# Patient Record
Sex: FEMALE | Race: WHITE | NOT HISPANIC OR LATINO | Employment: FULL TIME | URBAN - METROPOLITAN AREA
[De-identification: names, ages, dates, MRNs, and addresses within clinical notes are randomized per-mention and may not be internally consistent; named-entity substitution may affect disease eponyms.]

---

## 2017-01-06 DIAGNOSIS — I10 HTN (HYPERTENSION), BENIGN: ICD-10-CM

## 2017-01-06 DIAGNOSIS — E78.5 HLD (HYPERLIPIDEMIA): ICD-10-CM

## 2017-01-06 DIAGNOSIS — H10.13 ALLERGIC CONJUNCTIVITIS, BILATERAL: ICD-10-CM

## 2017-01-06 RX ORDER — CROMOLYN SODIUM 40 MG/ML
SOLUTION/ DROPS OPHTHALMIC
Qty: 10 ML | Refills: 12 | Status: SHIPPED | OUTPATIENT
Start: 2017-01-06 | End: 2018-02-20 | Stop reason: SDUPTHER

## 2017-01-06 RX ORDER — HYDROCHLOROTHIAZIDE 12.5 MG/1
CAPSULE ORAL
Qty: 90 CAPSULE | Refills: 0 | Status: SHIPPED | OUTPATIENT
Start: 2017-01-06 | End: 2017-02-14 | Stop reason: SDUPTHER

## 2017-01-06 RX ORDER — ATORVASTATIN CALCIUM 10 MG/1
TABLET, FILM COATED ORAL
Qty: 90 TABLET | Refills: 0 | Status: SHIPPED | OUTPATIENT
Start: 2017-01-06 | End: 2017-02-14 | Stop reason: SDUPTHER

## 2017-01-22 DIAGNOSIS — M71.21 BAKER CYST, RIGHT: ICD-10-CM

## 2017-01-22 RX ORDER — NAPROXEN 500 MG/1
TABLET ORAL
Qty: 60 TABLET | Refills: 0 | Status: SHIPPED | OUTPATIENT
Start: 2017-01-22 | End: 2017-02-14 | Stop reason: SDUPTHER

## 2017-02-09 ENCOUNTER — LAB VISIT (OUTPATIENT)
Dept: LAB | Facility: HOSPITAL | Age: 67
End: 2017-02-09
Attending: FAMILY MEDICINE
Payer: COMMERCIAL

## 2017-02-09 DIAGNOSIS — Z00.00 ROUTINE GENERAL MEDICAL EXAMINATION AT A HEALTH CARE FACILITY: ICD-10-CM

## 2017-02-09 LAB
ALBUMIN SERPL BCP-MCNC: 4.1 G/DL
ALP SERPL-CCNC: 68 U/L
ALT SERPL W/O P-5'-P-CCNC: 17 U/L
ANION GAP SERPL CALC-SCNC: 5 MMOL/L
AST SERPL-CCNC: 20 U/L
BASOPHILS # BLD AUTO: 0.01 K/UL
BASOPHILS NFR BLD: 0.2 %
BILIRUB SERPL-MCNC: 0.7 MG/DL
BUN SERPL-MCNC: 17 MG/DL
CALCIUM SERPL-MCNC: 9.2 MG/DL
CHLORIDE SERPL-SCNC: 104 MMOL/L
CHOLEST/HDLC SERPL: 2.3 {RATIO}
CO2 SERPL-SCNC: 29 MMOL/L
CREAT SERPL-MCNC: 0.8 MG/DL
DIFFERENTIAL METHOD: ABNORMAL
EOSINOPHIL # BLD AUTO: 0.2 K/UL
EOSINOPHIL NFR BLD: 3 %
ERYTHROCYTE [DISTWIDTH] IN BLOOD BY AUTOMATED COUNT: 12.6 %
EST. GFR  (AFRICAN AMERICAN): >60 ML/MIN/1.73 M^2
EST. GFR  (NON AFRICAN AMERICAN): >60 ML/MIN/1.73 M^2
GLUCOSE SERPL-MCNC: 85 MG/DL
HCT VFR BLD AUTO: 46.1 %
HDL/CHOLESTEROL RATIO: 42.7 %
HDLC SERPL-MCNC: 164 MG/DL
HDLC SERPL-MCNC: 70 MG/DL
HGB BLD-MCNC: 15.2 G/DL
LDLC SERPL CALC-MCNC: 82.4 MG/DL
LYMPHOCYTES # BLD AUTO: 2.4 K/UL
LYMPHOCYTES NFR BLD: 45.8 %
MCH RBC QN AUTO: 31.3 PG
MCHC RBC AUTO-ENTMCNC: 33 %
MCV RBC AUTO: 95 FL
MONOCYTES # BLD AUTO: 0.4 K/UL
MONOCYTES NFR BLD: 7.7 %
NEUTROPHILS # BLD AUTO: 2.3 K/UL
NEUTROPHILS NFR BLD: 43.1 %
NONHDLC SERPL-MCNC: 94 MG/DL
PLATELET # BLD AUTO: 170 K/UL
PMV BLD AUTO: 12 FL
POTASSIUM SERPL-SCNC: 3.9 MMOL/L
PROT SERPL-MCNC: 6.9 G/DL
RBC # BLD AUTO: 4.86 M/UL
SODIUM SERPL-SCNC: 138 MMOL/L
TRIGL SERPL-MCNC: 58 MG/DL
TSH SERPL DL<=0.005 MIU/L-ACNC: 1.91 UIU/ML
WBC # BLD AUTO: 5.31 K/UL

## 2017-02-09 PROCEDURE — 85025 COMPLETE CBC W/AUTO DIFF WBC: CPT

## 2017-02-09 PROCEDURE — 80061 LIPID PANEL: CPT

## 2017-02-09 PROCEDURE — 84443 ASSAY THYROID STIM HORMONE: CPT

## 2017-02-09 PROCEDURE — 36415 COLL VENOUS BLD VENIPUNCTURE: CPT | Mod: PO

## 2017-02-09 PROCEDURE — 80053 COMPREHEN METABOLIC PANEL: CPT

## 2017-02-14 ENCOUNTER — OFFICE VISIT (OUTPATIENT)
Dept: FAMILY MEDICINE | Facility: CLINIC | Age: 67
End: 2017-02-14
Payer: COMMERCIAL

## 2017-02-14 VITALS
WEIGHT: 116.88 LBS | HEART RATE: 82 BPM | HEIGHT: 60 IN | SYSTOLIC BLOOD PRESSURE: 120 MMHG | TEMPERATURE: 98 F | DIASTOLIC BLOOD PRESSURE: 80 MMHG | BODY MASS INDEX: 22.95 KG/M2

## 2017-02-14 DIAGNOSIS — Z12.31 ENCOUNTER FOR SCREENING MAMMOGRAM FOR BREAST CANCER: ICD-10-CM

## 2017-02-14 DIAGNOSIS — L81.9 HYPERPIGMENTATION: ICD-10-CM

## 2017-02-14 DIAGNOSIS — E78.5 HYPERLIPIDEMIA, UNSPECIFIED HYPERLIPIDEMIA TYPE: ICD-10-CM

## 2017-02-14 DIAGNOSIS — Z23 NEED FOR 23-POLYVALENT PNEUMOCOCCAL POLYSACCHARIDE VACCINE: ICD-10-CM

## 2017-02-14 DIAGNOSIS — M71.21 BAKER CYST, RIGHT: ICD-10-CM

## 2017-02-14 DIAGNOSIS — I10 HTN (HYPERTENSION), BENIGN: ICD-10-CM

## 2017-02-14 DIAGNOSIS — Z00.00 ANNUAL PHYSICAL EXAM: Primary | ICD-10-CM

## 2017-02-14 PROCEDURE — 1160F RVW MEDS BY RX/DR IN RCRD: CPT | Mod: S$GLB,,, | Performed by: NURSE PRACTITIONER

## 2017-02-14 PROCEDURE — 99213 OFFICE O/P EST LOW 20 MIN: CPT | Mod: 25,S$GLB,, | Performed by: NURSE PRACTITIONER

## 2017-02-14 PROCEDURE — 3074F SYST BP LT 130 MM HG: CPT | Mod: S$GLB,,, | Performed by: NURSE PRACTITIONER

## 2017-02-14 PROCEDURE — 1157F ADVNC CARE PLAN IN RCRD: CPT | Mod: S$GLB,,, | Performed by: NURSE PRACTITIONER

## 2017-02-14 PROCEDURE — 82570 ASSAY OF URINE CREATININE: CPT

## 2017-02-14 PROCEDURE — 1126F AMNT PAIN NOTED NONE PRSNT: CPT | Mod: S$GLB,,, | Performed by: NURSE PRACTITIONER

## 2017-02-14 PROCEDURE — 99999 PR PBB SHADOW E&M-EST. PATIENT-LVL III: CPT | Mod: PBBFAC,,, | Performed by: NURSE PRACTITIONER

## 2017-02-14 PROCEDURE — 1159F MED LIST DOCD IN RCRD: CPT | Mod: S$GLB,,, | Performed by: NURSE PRACTITIONER

## 2017-02-14 PROCEDURE — 99397 PER PM REEVAL EST PAT 65+ YR: CPT | Mod: 25,S$GLB,, | Performed by: NURSE PRACTITIONER

## 2017-02-14 PROCEDURE — 1158F ADVNC CARE PLAN TLK DOCD: CPT | Mod: S$GLB,,, | Performed by: NURSE PRACTITIONER

## 2017-02-14 PROCEDURE — 3079F DIAST BP 80-89 MM HG: CPT | Mod: S$GLB,,, | Performed by: NURSE PRACTITIONER

## 2017-02-14 RX ORDER — ATORVASTATIN CALCIUM 10 MG/1
10 TABLET, FILM COATED ORAL DAILY
Qty: 90 TABLET | Refills: 3 | Status: SHIPPED | OUTPATIENT
Start: 2017-02-14 | End: 2018-02-20 | Stop reason: SDUPTHER

## 2017-02-14 RX ORDER — NAPROXEN 500 MG/1
500 TABLET ORAL 2 TIMES DAILY WITH MEALS
Qty: 60 TABLET | Refills: 0 | Status: SHIPPED | OUTPATIENT
Start: 2017-02-14 | End: 2018-02-20

## 2017-02-14 RX ORDER — HYDROCHLOROTHIAZIDE 12.5 MG/1
12.5 CAPSULE ORAL DAILY
Qty: 90 CAPSULE | Refills: 3 | Status: SHIPPED | OUTPATIENT
Start: 2017-02-14 | End: 2018-02-20 | Stop reason: SDUPTHER

## 2017-02-14 RX ORDER — HYDROQUINONE 40 MG/G
CREAM TOPICAL 2 TIMES DAILY
Qty: 46 G | Refills: 2 | Status: SHIPPED | OUTPATIENT
Start: 2017-02-14 | End: 2017-03-16

## 2017-02-14 NOTE — MR AVS SNAPSHOT
[facility]  [address] [facility], [address]  [facility] [address]  Phone: [phone]  Fax: [phone]                  Yuliana Soares   2017 3:00 PM   Office Visit    Description:  Female : 1950   Provider:  GAUTAM Beltran   Department:  [facility]           Reason for Visit     Annual Exam           Diagnoses this Visit        Comments    Annual physical exam    -  Primary     Hyperlipidemia, unspecified hyperlipidemia type         HTN (hypertension), benign         Baker cyst, right         Hyperpigmentation         Need for 23-polyvalent pneumococcal polysaccharide vaccine                To Do List           Your Future Surgeries/Procedures     2017   Surgery with Nikunj Lujan MD   [facility]    [facility] [address] [phone]              Goals (5 Years of Data)     None       These Medications        Disp Refills Start End    atorvastatin (LIPITOR) 10 MG tablet 90 tablet 3 2017     Take 1 tablet (10 mg total) by mouth once daily. - Oral    Pharmacy: [facility] [address] [place] [address] [address] AT [facility] [address] Ph #: [phone]       hydrochlorothiazide (MICROZIDE) 12.5 mg capsule 90 capsule 3 2017     Take 1 capsule (12.5 mg total) by mouth once daily. - Oral    Pharmacy: [facility] [address] [place] [address] [address] AT [facility] & [address] Ph #: [phone]       naproxen (NAPROSYN) 500 MG tablet 60 tablet 0 2017     Take 1 tablet (500 mg total) by mouth 2 (two) times daily with meals. - Oral    Pharmacy: [facility] [address] [place] [address] [address] AT [facility] [address] Ph #: [phone]       hydroquinone 4 % Crea 46 g 2 2017 3/16/2017    Apply topically 2 (two) times daily. - Topical    Pharmacy:  [facility] [address] [place] [address] [address] AT [facility] & [address] Ph #: [phone]         [facility] On Call     [facility] On Call Nurse Care Line - 24/7 Assistance  Registered nurses in the [facility] On Call Center provide clinical advisement, health education, appointment booking, and other advisory services.  Call for this free service at [phone].             Medications           Message regarding Medications     Verify the changes and/or additions to your medication regime listed below are the same as discussed with your clinician today.  If any of these changes or additions are incorrect, please notify your healthcare provider.        START taking these NEW medications        Refills    hydroquinone 4 % Crea 2    Sig: Apply topically 2 (two) times daily.    Class: Normal    Route: Topical      CHANGE how you are taking these medications     Start Taking Instead of    atorvastatin (LIPITOR) 10 MG tablet atorvastatin (LIPITOR) 10 MG tablet    Dosage:  Take 1 tablet (10 mg total) by mouth once daily. Dosage:  TAKE ONE TABLET BY MOUTH EVERY DAY    Reason for Change:  Reorder     hydrochlorothiazide (MICROZIDE) 12.5 mg capsule hydrochlorothiazide (MICROZIDE) 12.5 mg capsule    Dosage:  Take 1 capsule (12.5 mg total) by mouth once daily. Dosage:  TAKE ONE CAPSULE BY MOUTH EVERY DAY    Reason for Change:  Reorder     naproxen (NAPROSYN) 500 MG tablet naproxen (NAPROSYN) 500 MG tablet    Dosage:  Take 1 tablet (500 mg total) by mouth 2 (two) times daily with meals. Dosage:  TAKE ONE TABLET BY MOUTH TWICE DAILY WITH MEALS    Reason for Change:  Reorder       STOP taking these medications     risedronate (ACTONEL) 150 MG Tab Take 1 tablet (150 mg total) by mouth every 30 days.           Verify that the below list of medications is an accurate representation of the medications you are currently taking.  If none reported, the list may be blank. If incorrect, please contact your  healthcare provider. Carry this list with you in case of emergency.           Current Medications     atorvastatin (LIPITOR) 10 MG tablet Take 1 tablet (10 mg total) by mouth once daily.    cromolyn (OPTICROM) 4 % ophthalmic solution INSTILL ONE DROP INTO BOTH EYES FOUR TIMES DAILY AS NEEDED    hydrochlorothiazide (MICROZIDE) 12.5 mg capsule Take 1 capsule (12.5 mg total) by mouth once daily.    sodium,potassium,mag sulfates (SUPREP BOWEL PREP KIT) 17.5-3.13-1.6 gram SolR Take 1 each by mouth as directed.    hydroquinone 4 % Crea Apply topically 2 (two) times daily.    naproxen (NAPROSYN) 500 MG tablet Take 1 tablet (500 mg total) by mouth 2 (two) times daily with meals.           Clinical Reference Information           Your Vitals Were     BP Pulse Temp Height Weight BMI    120/80 82 97.8 °F (36.6 °C) 5' (1.524 m) 53 kg (116 lb 13.5 oz) 22.82 kg/m2      Blood Pressure          Most Recent Value    BP  120/80      Allergies as of 2/14/2017     Percodan  [Oxycodone Hcl-oxycodone-asa]      Immunizations Administered on Date of Encounter - 2/14/2017     Name Date Dose VIS Date Route    Pneumococcal Polysaccharide - 23 Valent  Incomplete 0.5 mL 4/24/2015 Intramuscular      Orders Placed During Today's Visit      Normal Orders This Visit    MICROALBUMIN / CREATININE RATIO URINE     Pneumococcal Polysaccharide Vaccine (23 Valent) (SQ/IM)       Instructions    Fill out your Advanced directives packet and mail a copy to us for your medical record.    Continue current medications    I will call you about your urine test    You can make a nurse appointment for your second pneumonia vaccine on or after March 11th.      Prevention Guidelines, Women Ages 65 and Older  Screening tests and vaccines are an important part of managing your health. Health counseling is essential, too. Below are guidelines for these, for women ages 65 and older. Talk with your healthcare provider to make sure youre up to date on what you  need.  Screening Who needs it How often   Type 2 diabetes or prediabetes All adults beginning at age 45 and adults without symptoms at any age who are overweight or obese and have 1 or more additional risk factors for diabetes At least every 3 years   Alcohol misuse All women in this age group At routine exams   Blood pressure All women in this age group Every 2 years if your blood pressure is less than 120/80 mm Hg; yearly if your systolic blood pressure is 120 to 139 mm Hg, or your diastolic blood pressure reading is 80 to 89 mm Hg   Breast cancer All women in this age group Yearly mammogram and clinical breast exam1   Cervical cancer Only women who had abnormal screening results before age 65 Talk with your healthcare provider   Chlamydia Women at increased risk for infection At routine exams   Colorectal cancer All women in this age group1 Flexible sigmoidoscopy every 5 years, or colonoscopy every 10 years, or double-contrast barium enema every 5 years; yearly fecal occult blood test or fecal immunochemical test; or a stool DNA test as often as your healthcare provider advises; talk with your healthcare provider about which tests are best for you   Depression All women in this age group At routine exams   Gonorrhea Sexually active women at increased risk for infection At routine exams   Hepatitis C Anyone at increased risk; 1 time for those born between 1945 and 1965 At routine exams   High cholesterol or triglycerides All women in this age group who are at risk for coronary artery disease At least every 5 years   HIV Women at increased risk for infection - talk with your healthcare provider At routine exams   Lung cancer Adults age 55 to 80 who have smoked Yearly screening in smokers with 30 pack-year history of smoking or who quit within 15 years   Obesity All women in this age group At routine exams   Osteoporosis All women in this age group Bone density test at age 65, then follow-up as advised by your  healthcare provider   Syphilis Women at increased risk for infection - talk with your healthcare provider At routine exams   Thyroid-Stimulating Hormone (TSH) All women in this age group Every 5 years   Tuberculosis Women at increased risk for infection - talk with your healthcare provider Ask your healthcare provider   Vision All women in this age group Every 1 to 2 years; if you have a chronic health condition, ask your healthcare provider if you need exams more often   Vaccine Who needs it How often   Chickenpox (varicella) All women in this age group who have no record of this infection or vaccine 2 doses; second dose should be given at least 4 weeks after the first dose   Hepatitis A Women at increased risk for infection - talk with your healthcare provider 2 doses given 6 months apart   Hepatitis B Women at increased risk for infection - talk with your healthcare provider 3 doses over 6 months; second dose should be given 1 month after the first dose; the third dose should be given at least 2 months after the second dose and at least 4 months after the first dose   Haemophilus influenza Type B (HIB) Women at increased risk for infection - talk with your healthcare provider 1 to 3 doses   Influenza (flu) All women in this age group Once a year   Pneumococcal conjugate vaccine (PCV13) and pneumococcal polysaccharide vaccine (PPSV23) All women in this age group 1 dose of each vaccine   Tetanus/diphtheria/pertussis (Td/Tdap) booster All women in this age group Td every 10 years, or a one-time dose of Tdap instead of a Td booster after age 18, then Td every 10 years   Zoster All women in this age group 1 dose   Counseling Who needs it How often   Diet and exercise Women who are overweight or obese When diagnosed, and then at routine exams   Fall prevention (exercise and vitamin D supplements) All women in this age group At routine exams   Sexually transmitted infection prevention Women at increased risk for  infection - talk with your healthcare provider At routine exams   Use of daily aspirin Women ages 55 and up in this age group who are at risk for cardiovascular health problems such as stroke When your risk is known   Use of tobacco and the health effects it can cause All women in this age group Every exam   1American Cancer Society  Date Last Reviewed: 8/9/2015  © 2301-5758 [employer]. [address] [place] [address]. All rights reserved. This information is not intended as a substitute for professional medical care. Always follow your healthcare professional's instructions.             Language Assistance Services     ATTENTION: Language assistance services are available, free of charge. Please call [phone].      ATENCIÓN: Si habla marychuy, tiene a vicente disposición servicios gratuitos de asistencia lingüística. Llame al [phone].     CHÚ Ý: N?u b?n nói Ti?ng Vi?t, có các d?ch v? h? tr? ngôn ng? mi?n phí dành cho b?n. G?i s? [phone].         [facility] complies with applicable Federal civil rights laws and does not discriminate on the basis of race, color, national origin, age, disability, or sex.

## 2017-02-14 NOTE — PATIENT INSTRUCTIONS
Fill out your Advanced directives packet and mail a copy to us for your medical record.    Continue current medications    I will call you about your urine test    You can make a nurse appointment for your second pneumonia vaccine on or after March 11th.      Prevention Guidelines, Women Ages 65 and Older  Screening tests and vaccines are an important part of managing your health. Health counseling is essential, too. Below are guidelines for these, for women ages 65 and older. Talk with your healthcare provider to make sure youre up to date on what you need.  Screening Who needs it How often   Type 2 diabetes or prediabetes All adults beginning at age 45 and adults without symptoms at any age who are overweight or obese and have 1 or more additional risk factors for diabetes At least every 3 years   Alcohol misuse All women in this age group At routine exams   Blood pressure All women in this age group Every 2 years if your blood pressure is less than 120/80 mm Hg; yearly if your systolic blood pressure is 120 to 139 mm Hg, or your diastolic blood pressure reading is 80 to 89 mm Hg   Breast cancer All women in this age group Yearly mammogram and clinical breast exam1   Cervical cancer Only women who had abnormal screening results before age 65 Talk with your healthcare provider   Chlamydia Women at increased risk for infection At routine exams   Colorectal cancer All women in this age group1 Flexible sigmoidoscopy every 5 years, or colonoscopy every 10 years, or double-contrast barium enema every 5 years; yearly fecal occult blood test or fecal immunochemical test; or a stool DNA test as often as your healthcare provider advises; talk with your healthcare provider about which tests are best for you   Depression All women in this age group At routine exams   Gonorrhea Sexually active women at increased risk for infection At routine exams   Hepatitis C Anyone at increased risk; 1 time for those born between 1945 and  1965 At routine exams   High cholesterol or triglycerides All women in this age group who are at risk for coronary artery disease At least every 5 years   HIV Women at increased risk for infection - talk with your healthcare provider At routine exams   Lung cancer Adults age 55 to 80 who have smoked Yearly screening in smokers with 30 pack-year history of smoking or who quit within 15 years   Obesity All women in this age group At routine exams   Osteoporosis All women in this age group Bone density test at age 65, then follow-up as advised by your healthcare provider   Syphilis Women at increased risk for infection - talk with your healthcare provider At routine exams   Thyroid-Stimulating Hormone (TSH) All women in this age group Every 5 years   Tuberculosis Women at increased risk for infection - talk with your healthcare provider Ask your healthcare provider   Vision All women in this age group Every 1 to 2 years; if you have a chronic health condition, ask your healthcare provider if you need exams more often   Vaccine Who needs it How often   Chickenpox (varicella) All women in this age group who have no record of this infection or vaccine 2 doses; second dose should be given at least 4 weeks after the first dose   Hepatitis A Women at increased risk for infection - talk with your healthcare provider 2 doses given 6 months apart   Hepatitis B Women at increased risk for infection - talk with your healthcare provider 3 doses over 6 months; second dose should be given 1 month after the first dose; the third dose should be given at least 2 months after the second dose and at least 4 months after the first dose   Haemophilus influenza Type B (HIB) Women at increased risk for infection - talk with your healthcare provider 1 to 3 doses   Influenza (flu) All women in this age group Once a year   Pneumococcal conjugate vaccine (PCV13) and pneumococcal polysaccharide vaccine (PPSV23) All women in this age group 1 dose  of each vaccine   Tetanus/diphtheria/pertussis (Td/Tdap) booster All women in this age group Td every 10 years, or a one-time dose of Tdap instead of a Td booster after age 18, then Td every 10 years   Zoster All women in this age group 1 dose   Counseling Who needs it How often   Diet and exercise Women who are overweight or obese When diagnosed, and then at routine exams   Fall prevention (exercise and vitamin D supplements) All women in this age group At routine exams   Sexually transmitted infection prevention Women at increased risk for infection - talk with your healthcare provider At routine exams   Use of daily aspirin Women ages 55 and up in this age group who are at risk for cardiovascular health problems such as stroke When your risk is known   Use of tobacco and the health effects it can cause All women in this age group Every exam   1American Cancer Society  Date Last Reviewed: 8/9/2015  © 5996-6515 [employer]. [address] [place] [address], [place] [address]. All rights reserved. This information is not intended as a substitute for professional medical care. Always follow your healthcare professional's instructions.
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
## 2017-02-15 LAB
CREAT UR-MCNC: 45 MG/DL
MICROALBUMIN UR DL<=1MG/L-MCNC: <2.5 UG/ML
MICROALBUMIN/CREATININE RATIO: NORMAL UG/MG

## 2017-02-17 ENCOUNTER — ANESTHESIA (OUTPATIENT)
Dept: ENDOSCOPY | Facility: HOSPITAL | Age: 67
End: 2017-02-17
Payer: COMMERCIAL

## 2017-02-17 ENCOUNTER — SURGERY (OUTPATIENT)
Age: 67
End: 2017-02-17

## 2017-02-17 ENCOUNTER — ANESTHESIA EVENT (OUTPATIENT)
Dept: ENDOSCOPY | Facility: HOSPITAL | Age: 67
End: 2017-02-17
Payer: COMMERCIAL

## 2017-02-17 VITALS — RESPIRATION RATE: 13 BRPM

## 2017-02-17 PROBLEM — Z12.11 SPECIAL SCREENING FOR MALIGNANT NEOPLASMS, COLON: Status: ACTIVE | Noted: 2017-02-17

## 2017-02-17 PROBLEM — K57.30 SIGMOID DIVERTICULOSIS: Status: ACTIVE | Noted: 2017-02-17

## 2017-02-17 PROCEDURE — D9220A PRA ANESTHESIA: Mod: 33,ANES,, | Performed by: ANESTHESIOLOGY

## 2017-02-17 PROCEDURE — D9220A PRA ANESTHESIA: Mod: 33,CRNA,, | Performed by: NURSE ANESTHETIST, CERTIFIED REGISTERED

## 2017-02-17 PROCEDURE — 63600175 PHARM REV CODE 636 W HCPCS: Performed by: NURSE ANESTHETIST, CERTIFIED REGISTERED

## 2017-02-17 PROCEDURE — 25000003 PHARM REV CODE 250: Performed by: NURSE ANESTHETIST, CERTIFIED REGISTERED

## 2017-02-17 RX ORDER — LIDOCAINE HCL/PF 100 MG/5ML
SYRINGE (ML) INTRAVENOUS
Status: DISCONTINUED | OUTPATIENT
Start: 2017-02-17 | End: 2017-02-17

## 2017-02-17 RX ORDER — PROPOFOL 10 MG/ML
VIAL (ML) INTRAVENOUS CONTINUOUS PRN
Status: DISCONTINUED | OUTPATIENT
Start: 2017-02-17 | End: 2017-02-17

## 2017-02-17 RX ORDER — PROPOFOL 10 MG/ML
VIAL (ML) INTRAVENOUS
Status: DISCONTINUED | OUTPATIENT
Start: 2017-02-17 | End: 2017-02-17

## 2017-02-17 RX ADMIN — LIDOCAINE HYDROCHLORIDE 100 MG: 20 INJECTION, SOLUTION INTRAVENOUS at 10:02

## 2017-02-17 RX ADMIN — PROPOFOL 60 MG: 10 INJECTION, EMULSION INTRAVENOUS at 10:02

## 2017-02-17 RX ADMIN — PROPOFOL 150 MCG/KG/MIN: 10 INJECTION, EMULSION INTRAVENOUS at 10:02

## 2017-02-17 NOTE — ANESTHESIA PREPROCEDURE EVALUATION
02/17/2017  Yuliana Soares is a 66 y.o., female.    [facility] Anesthesia Evaluation    I have reviewed the Patient Summary Reports.    I have reviewed the Nursing Notes.   I have reviewed the Medications.     Review of Systems  Anesthesia Hx:  No problems with previous Anesthesia  History of prior surgery of interest to airway management or planning: Previous anesthesia: General Denies Family Hx of Anesthesia complications.   Denies Personal Hx of Anesthesia complications.   Social:  Non-Smoker    Cardiovascular:   Exercise tolerance: good Hypertension Denies CAD.       Pulmonary:  Pulmonary Normal    Renal/:  Renal/ Normal     Hepatic/GI:   Bowel Prep.    Neurological:  Neurology Normal    Endocrine:  Endocrine Normal        Physical Exam  General:  Well nourished    Airway/Jaw/Neck:  Airway Findings: Mouth Opening: Normal Tongue: Normal  General Airway Assessment: Adult  Mallampati: II  Improves to I with phonation.  TM Distance: Normal, at least 6 cm  Jaw/Neck Findings:  Neck ROM: Normal ROM      Dental:  Dental Findings: In tact        Mental Status:  Mental Status Findings:  Cooperative, Alert and Oriented         Anesthesia Plan  Type of Anesthesia, risks & benefits discussed:  Anesthesia Type:  general  Patient's Preference:   Intra-op Monitoring Plan:   Intra-op Monitoring Plan Comments:   Post Op Pain Control Plan:   Post Op Pain Control Plan Comments:   Induction:   IV  Beta Blocker:  Patient is not currently on a Beta-Blocker (No further documentation required).       Informed Consent: Patient understands risks and agrees with Anesthesia plan.  Questions answered. Anesthesia consent signed with patient.  ASA Score: 2     Day of Surgery Review of History & Physical:    H&P update referred to the surgeon.         Ready For Surgery From Anesthesia Perspective.

## 2017-02-17 NOTE — ANESTHESIA RELEASE NOTE
Anesthesia Release from PACU Note    Patient: Yuliana Soares    Procedure(s) Performed: Procedure(s) (LRB):  COLONOSCOPY (N/A)    Anesthesia type: general    Post pain: Adequate analgesia    Post assessment: no apparent anesthetic complications and tolerated procedure well    Last Vitals:   Visit Vitals    BP (!) 157/82    Pulse 74    Temp 36.6 °C (97.8 °F)    Resp 16    Ht 5' (1.524 m)    Wt 52.2 kg (115 lb)    SpO2 96%    Breastfeeding No    BMI 22.46 kg/m2       Post vital signs: stable    Level of consciousness: awake and alert     Nausea/Vomiting: no nausea/no vomiting    Complications: none    Airway Patency: patent    Respiratory: unassisted, spontaneous ventilation, room air    Cardiovascular: stable and blood pressure at baseline    Hydration: euvolemic

## 2017-02-17 NOTE — ANESTHESIA POSTPROCEDURE EVALUATION
Anesthesia Post Evaluation    Patient: Yuliana Soares    Procedure(s) Performed: Procedure(s) (LRB):  COLONOSCOPY (N/A)    Final Anesthesia Type: general  Patient location during evaluation: GI PACU  Patient participation: Yes- Able to Participate  Level of consciousness: awake and alert  Post-procedure vital signs: reviewed and stable  Pain management: adequate  Airway patency: patent  PONV status at discharge: No PONV  Anesthetic complications: no      Cardiovascular status: hemodynamically stable  Respiratory status: unassisted, spontaneous ventilation and room air  Hydration status: euvolemic  Follow-up not needed.        Visit Vitals    BP (!) 157/82    Pulse 74    Temp 36.6 °C (97.8 °F)    Resp 16    Ht 5' (1.524 m)    Wt 52.2 kg (115 lb)    SpO2 96%    Breastfeeding No    BMI 22.46 kg/m2       Pain/Devon Score: Pain Assessment Performed: Yes (2/17/2017 11:24 AM)  Presence of Pain: denies (2/17/2017 11:24 AM)  Devon Score: 10 (2/17/2017 11:24 AM)

## 2017-02-17 NOTE — TRANSFER OF CARE
Anesthesia Transfer of Care Note    Patient: Yuliana Soares    Procedure(s) Performed: Procedure(s) (LRB):  COLONOSCOPY (N/A)    Patient location: PACU    Anesthesia Type: general    Transport from OR: Transported from OR on room air with adequate spontaneous ventilation    Post pain: adequate analgesia    Post assessment: no apparent anesthetic complications    Post vital signs: stable    Level of consciousness: alert, awake and oriented    Nausea/Vomiting: no nausea/vomiting    Complications: none          Last vitals:   Visit Vitals    BP (!) 145/83    Pulse 74    Temp 36.6 °C (97.9 °F)    Resp 12    Ht 5' (1.524 m)    Wt 52.2 kg (115 lb)    SpO2 97%    Breastfeeding No    BMI 22.46 kg/m2

## 2017-03-01 DIAGNOSIS — M71.21 BAKER CYST, RIGHT: ICD-10-CM

## 2017-03-02 RX ORDER — NAPROXEN 500 MG/1
TABLET ORAL
Qty: 60 TABLET | Refills: 0 | OUTPATIENT
Start: 2017-03-02

## 2017-04-04 DIAGNOSIS — E78.5 HLD (HYPERLIPIDEMIA): ICD-10-CM

## 2017-04-04 RX ORDER — ATORVASTATIN CALCIUM 10 MG/1
TABLET, FILM COATED ORAL
Qty: 90 TABLET | Refills: 0 | OUTPATIENT
Start: 2017-04-04

## 2017-10-17 ENCOUNTER — TELEPHONE (OUTPATIENT)
Dept: FAMILY MEDICINE | Facility: CLINIC | Age: 67
End: 2017-10-17

## 2017-10-17 ENCOUNTER — CLINICAL SUPPORT (OUTPATIENT)
Dept: FAMILY MEDICINE | Facility: CLINIC | Age: 67
End: 2017-10-17
Payer: COMMERCIAL

## 2017-10-17 DIAGNOSIS — Z23 NEED FOR PROPHYLACTIC VACCINATION AND INOCULATION AGAINST INFLUENZA: Primary | ICD-10-CM

## 2017-10-17 DIAGNOSIS — Z00.00 ROUTINE GENERAL MEDICAL EXAMINATION AT A HEALTH CARE FACILITY: Primary | ICD-10-CM

## 2017-10-17 PROCEDURE — 90732 PPSV23 VACC 2 YRS+ SUBQ/IM: CPT | Mod: S$GLB,,, | Performed by: FAMILY MEDICINE

## 2017-10-17 PROCEDURE — 90471 IMMUNIZATION ADMIN: CPT | Mod: S$GLB,,, | Performed by: FAMILY MEDICINE

## 2017-10-17 PROCEDURE — 90472 IMMUNIZATION ADMIN EACH ADD: CPT | Mod: S$GLB,,, | Performed by: FAMILY MEDICINE

## 2017-10-17 PROCEDURE — 90662 IIV NO PRSV INCREASED AG IM: CPT | Mod: S$GLB,,, | Performed by: FAMILY MEDICINE

## 2017-10-17 NOTE — PROGRESS NOTES
Two patient identifiers used, allergies reviewed, vaccines confirmed.  Pneumovax/23 pneumococcal polysaccharide vaccine administered IM right deltoid.  Flu consent signed by patient. Flu vaccine administered left deltoid.  Pt tolerated both injections well, no redness or bruising at either injection site.  Pt advised to remain in clinic 15 mins following injections for observation.

## 2017-10-17 NOTE — TELEPHONE ENCOUNTER
----- Message from Maureen Samayoa sent at 10/16/2017  4:31 PM CDT -----  Contact: patient  Doctor appointment and lab have been scheduled.  Please link lab orders to the lab appointment.  Date of doctor appointment:  2-20  Physical or EP:  physical  Date of lab appointment:  2-14  Comments:     Patient would like to schedule a pneumonia shot the second shot. Please call to schedule.

## 2017-12-21 ENCOUNTER — OFFICE VISIT (OUTPATIENT)
Dept: URGENT CARE | Facility: CLINIC | Age: 67
End: 2017-12-21
Payer: COMMERCIAL

## 2017-12-21 VITALS
HEART RATE: 77 BPM | WEIGHT: 115 LBS | DIASTOLIC BLOOD PRESSURE: 90 MMHG | HEIGHT: 60 IN | BODY MASS INDEX: 22.58 KG/M2 | TEMPERATURE: 99 F | RESPIRATION RATE: 16 BRPM | SYSTOLIC BLOOD PRESSURE: 165 MMHG | OXYGEN SATURATION: 96 %

## 2017-12-21 DIAGNOSIS — J10.1 INFLUENZA A: ICD-10-CM

## 2017-12-21 DIAGNOSIS — R05.9 COUGH: Primary | ICD-10-CM

## 2017-12-21 LAB
CTP QC/QA: YES
FLUAV AG NPH QL: POSITIVE
FLUBV AG NPH QL: NEGATIVE

## 2017-12-21 PROCEDURE — 99214 OFFICE O/P EST MOD 30 MIN: CPT | Mod: 25,S$GLB,, | Performed by: EMERGENCY MEDICINE

## 2017-12-21 PROCEDURE — 87804 INFLUENZA ASSAY W/OPTIC: CPT | Mod: QW,S$GLB,, | Performed by: EMERGENCY MEDICINE

## 2017-12-21 RX ORDER — OSELTAMIVIR PHOSPHATE 75 MG/1
75 CAPSULE ORAL 2 TIMES DAILY
Qty: 10 CAPSULE | Refills: 0 | Status: SHIPPED | OUTPATIENT
Start: 2017-12-21 | End: 2017-12-26

## 2017-12-21 RX ORDER — BENZONATATE 100 MG/1
100 CAPSULE ORAL 3 TIMES DAILY PRN
Qty: 21 CAPSULE | Refills: 0 | Status: SHIPPED | OUTPATIENT
Start: 2017-12-21 | End: 2017-12-28

## 2017-12-22 NOTE — PATIENT INSTRUCTIONS
TESSALON PERLES RX  TAMIFLU RX  CONTINUE DAYQUIL AND NYQUIL FOR CONGESTION/COUGH  ADD MUCINEX DM IF NEEDED TO LOOSEN UP PHLEGM  REST AND HYDRATE WITH PLENTY OF FLUIDS  FLU A POSITIVE ON SWAB    SEE INFLUENZA SHEET      Influenza (Adult)    Influenza is also called the flu. It is a viral illness that affects the air passages of your lungs. It is different from the common cold. The flu can easily be passed from one to person to another. It may be spread through the air by coughing and sneezing. Or it can be spread by touching the sick person and then touching your own eyes, nose, or mouth.  The flu starts 1 to 3 days after you are exposed to the flu virus. It may last for 1 to 2 weeks but many people feel tired or fatigued for many weeks afterward. You usually dont need to take antibiotics unless you have a complication. This might be an ear or sinus infection or pneumonia.  Symptoms of the flu may be mild or severe. They can include extreme tiredness (wanting to stay in bed all day), chills, fevers, muscle aches, soreness with eye movement, headache, and a dry, hacking cough.  Home care  Follow these guidelines when caring for yourself at home:  · Avoid being around cigarette smoke, whether yours or other peoples.  · Acetaminophen or ibuprofen will help ease your fever, muscle aches, and headache. Dont give aspirin to anyone younger than 18 who has the flu. Aspirin can harm the liver.  · Nausea and loss of appetite are common with the flu. Eat light meals. Drink 6 to 8 glasses of liquids every day. Good choices are water, sport drinks, soft drinks without caffeine, juices, tea, and soup. Extra fluids will also help loosen secretions in your nose and lungs.  · Over-the-counter cold medicines will not make the flu go away faster. But the medicines may help with coughing, sore throat, and congestion in your nose and sinuses. Dont use a decongestant if you have high blood pressure.  · Stay home until your fever has  been gone for at least 24 hours without using medicine to reduce fever.  Follow-up care  Follow up with your healthcare provider, or as advised, if you are not getting better over the next week.  If you are age 65 or older, talk with your provider about getting a pneumococcal vaccine every 5 years. You should also get this vaccine if you have chronic asthma or COPD. All adults should get a flu vaccine every fall. Ask your provider about this.  When to seek medical advice  Call your healthcare provider right away if any of these occur:  · Cough with lots of colored mucus (sputum) or blood in your mucus  · Chest pain, shortness of breath, wheezing, or trouble breathing  · Severe headache, or face, neck, or ear pain  · New rash with fever  · Fever of 100.4°F (38°C) or higher, or as directed by your healthcare provider  · Confusion, behavior change, or seizure  · Severe weakness or dizziness  · You get a new fever or cough after getting better for a few days  Date Last Reviewed: 1/1/2017  © 0835-3209 [employer]. [address] [place] [address], [place] [address]. All rights reserved. This information is not intended as a substitute for professional medical care. Always follow your healthcare professional's instructions.

## 2017-12-22 NOTE — PROGRESS NOTES
Subjective:       Patient ID: Yuliana Soares is a 67 y.o. female.    Vitals:    12/21/17 2013   BP: (!) 165/90   Pulse: 77   Resp: 16   Temp: 98.5 °F (36.9 °C)       Chief Complaint: Sore Throat    Pt states cough x 3 weeks that has become productive in the past couple of days. Pt states sore throat, congestion and body aches x 2 days. THIS IS DIFFERENT THAN WHAT HAD BEEN GOING ON WEEKS AGO. MARKED FATIGUE AND ACHES OVER THE LAST FEW DAYS      Sore Throat    This is a new problem. The current episode started 1 to 4 weeks ago. The problem has been gradually worsening. There has been no fever. Associated symptoms include congestion, coughing and swollen glands. Pertinent negatives include no abdominal pain, ear pain, headaches, hoarse voice or shortness of breath. Treatments tried: day quil. The treatment provided mild relief.     Review of Systems   Constitution: Positive for malaise/fatigue. Negative for chills and fever.   HENT: Positive for congestion and sore throat. Negative for ear pain and hoarse voice.    Eyes: Negative for discharge and redness.   Cardiovascular: Negative for chest pain, dyspnea on exertion and leg swelling.   Respiratory: Positive for cough and sputum production. Negative for shortness of breath and wheezing.    Musculoskeletal: Negative for myalgias.   Gastrointestinal: Negative for abdominal pain and nausea.   Neurological: Negative for headaches.       Objective:      Physical Exam   Constitutional: She is oriented to person, place, and time. She appears well-developed and well-nourished. She is cooperative.  Non-toxic appearance. She does not appear ill. No distress.   HENT:   Head: Normocephalic and atraumatic.   Right Ear: Hearing, tympanic membrane, external ear and ear canal normal.   Left Ear: Hearing, tympanic membrane, external ear and ear canal normal.   Nose: No mucosal edema, rhinorrhea or nasal deformity. No epistaxis. Right sinus exhibits no maxillary sinus tenderness  and no frontal sinus tenderness. Left sinus exhibits no maxillary sinus tenderness and no frontal sinus tenderness.   Mouth/Throat: Uvula is midline, oropharynx is clear and moist and mucous membranes are normal. No trismus in the jaw. Normal dentition. No uvula swelling. No posterior oropharyngeal erythema.   NASAL CONGESTION   Eyes: Conjunctivae and lids are normal. No scleral icterus.   Sclera clear bilat   Neck: Trachea normal, full passive range of motion without pain and phonation normal. Neck supple.   Cardiovascular: Normal rate, regular rhythm, normal heart sounds, intact distal pulses and normal pulses.    Pulmonary/Chest: Effort normal and breath sounds normal. No respiratory distress.   OCCASIONAL COUGH   Abdominal: Soft. Normal appearance and bowel sounds are normal. There is no tenderness.   Musculoskeletal: Normal range of motion. She exhibits no edema or deformity.   Neurological: She is alert and oriented to person, place, and time. She exhibits normal muscle tone. Coordination normal.   Skin: Skin is warm, dry and intact. She is not diaphoretic. No pallor.   Psychiatric: She has a normal mood and affect. Her speech is normal and behavior is normal. Cognition and memory are normal.   Nursing note and vitals reviewed.        Office Visit on 12/21/2017   Component Date Value Ref Range Status    Rapid Influenza A Ag 12/21/2017 Positive* Negative Final    Rapid Influenza B Ag 12/21/2017 Negative  Negative Final     Acceptable 12/21/2017 Yes   Final       Assessment:       1. Cough    2. Influenza A        Plan:       Yuliana was seen today for sore throat.    Diagnoses and all orders for this visit:    Cough  -     POCT Influenza A/B    Influenza A    Other orders  -     benzonatate (TESSALON PERLES) 100 MG capsule; Take 1 capsule (100 mg total) by mouth 3 (three) times daily as needed for Cough.  -     oseltamivir (TAMIFLU) 75 MG capsule; Take 1 capsule (75 mg total) by mouth 2 (two)  times daily.          Patient Instructions   TESSALON PERLES RX  TAMIFLU RX  CONTINUE DAYQUIL AND NYQUIL FOR CONGESTION/COUGH  ADD MUCINEX DM IF NEEDED TO LOOSEN UP PHLEGM  REST AND HYDRATE WITH PLENTY OF FLUIDS  FLU A POSITIVE ON SWAB    SEE INFLUENZA SHEET      Influenza (Adult)    Influenza is also called the flu. It is a viral illness that affects the air passages of your lungs. It is different from the common cold. The flu can easily be passed from one to person to another. It may be spread through the air by coughing and sneezing. Or it can be spread by touching the sick person and then touching your own eyes, nose, or mouth.  The flu starts 1 to 3 days after you are exposed to the flu virus. It may last for 1 to 2 weeks but many people feel tired or fatigued for many weeks afterward. You usually dont need to take antibiotics unless you have a complication. This might be an ear or sinus infection or pneumonia.  Symptoms of the flu may be mild or severe. They can include extreme tiredness (wanting to stay in bed all day), chills, fevers, muscle aches, soreness with eye movement, headache, and a dry, hacking cough.  Home care  Follow these guidelines when caring for yourself at home:  · Avoid being around cigarette smoke, whether yours or other peoples.  · Acetaminophen or ibuprofen will help ease your fever, muscle aches, and headache. Dont give aspirin to anyone younger than 18 who has the flu. Aspirin can harm the liver.  · Nausea and loss of appetite are common with the flu. Eat light meals. Drink 6 to 8 glasses of liquids every day. Good choices are water, sport drinks, soft drinks without caffeine, juices, tea, and soup. Extra fluids will also help loosen secretions in your nose and lungs.  · Over-the-counter cold medicines will not make the flu go away faster. But the medicines may help with coughing, sore throat, and congestion in your nose and sinuses. Dont use a decongestant if you have high blood  pressure.  · Stay home until your fever has been gone for at least 24 hours without using medicine to reduce fever.  Follow-up care  Follow up with your healthcare provider, or as advised, if you are not getting better over the next week.  If you are age 65 or older, talk with your provider about getting a pneumococcal vaccine every 5 years. You should also get this vaccine if you have chronic asthma or COPD. All adults should get a flu vaccine every fall. Ask your provider about this.  When to seek medical advice  Call your healthcare provider right away if any of these occur:  · Cough with lots of colored mucus (sputum) or blood in your mucus  · Chest pain, shortness of breath, wheezing, or trouble breathing  · Severe headache, or face, neck, or ear pain  · New rash with fever  · Fever of 100.4°F (38°C) or higher, or as directed by your healthcare provider  · Confusion, behavior change, or seizure  · Severe weakness or dizziness  · You get a new fever or cough after getting better for a few days  Date Last Reviewed: 1/1/2017  © 3824-5730 [employer]. [address] [place] [address], [place] [address]. All rights reserved. This information is not intended as a substitute for professional medical care. Always follow your healthcare professional's instructions.

## 2018-02-16 ENCOUNTER — LAB VISIT (OUTPATIENT)
Dept: LAB | Facility: HOSPITAL | Age: 68
End: 2018-02-16
Attending: FAMILY MEDICINE
Payer: COMMERCIAL

## 2018-02-16 DIAGNOSIS — Z00.00 ROUTINE GENERAL MEDICAL EXAMINATION AT A HEALTH CARE FACILITY: ICD-10-CM

## 2018-02-16 LAB
ALBUMIN SERPL BCP-MCNC: 3.9 G/DL
ALP SERPL-CCNC: 73 U/L
ALT SERPL W/O P-5'-P-CCNC: 14 U/L
ANION GAP SERPL CALC-SCNC: 6 MMOL/L
AST SERPL-CCNC: 19 U/L
BASOPHILS # BLD AUTO: 0.02 K/UL
BASOPHILS NFR BLD: 0.5 %
BILIRUB SERPL-MCNC: 0.6 MG/DL
BUN SERPL-MCNC: 20 MG/DL
CALCIUM SERPL-MCNC: 9.3 MG/DL
CHLORIDE SERPL-SCNC: 105 MMOL/L
CHOLEST SERPL-MCNC: 161 MG/DL
CHOLEST/HDLC SERPL: 2.3 {RATIO}
CO2 SERPL-SCNC: 29 MMOL/L
CREAT SERPL-MCNC: 0.8 MG/DL
DIFFERENTIAL METHOD: NORMAL
EOSINOPHIL # BLD AUTO: 0.1 K/UL
EOSINOPHIL NFR BLD: 2.4 %
ERYTHROCYTE [DISTWIDTH] IN BLOOD BY AUTOMATED COUNT: 12 %
EST. GFR  (AFRICAN AMERICAN): >60 ML/MIN/1.73 M^2
EST. GFR  (NON AFRICAN AMERICAN): >60 ML/MIN/1.73 M^2
GLUCOSE SERPL-MCNC: 84 MG/DL
HCT VFR BLD AUTO: 42.5 %
HDLC SERPL-MCNC: 71 MG/DL
HDLC SERPL: 44.1 %
HGB BLD-MCNC: 14.1 G/DL
IMM GRANULOCYTES # BLD AUTO: 0.01 K/UL
IMM GRANULOCYTES NFR BLD AUTO: 0.2 %
LDLC SERPL CALC-MCNC: 81.4 MG/DL
LYMPHOCYTES # BLD AUTO: 1.8 K/UL
LYMPHOCYTES NFR BLD: 41.8 %
MCH RBC QN AUTO: 30.8 PG
MCHC RBC AUTO-ENTMCNC: 33.2 G/DL
MCV RBC AUTO: 93 FL
MONOCYTES # BLD AUTO: 0.4 K/UL
MONOCYTES NFR BLD: 9.2 %
NEUTROPHILS # BLD AUTO: 1.9 K/UL
NEUTROPHILS NFR BLD: 45.9 %
NONHDLC SERPL-MCNC: 90 MG/DL
NRBC BLD-RTO: 0 /100 WBC
PLATELET # BLD AUTO: 179 K/UL
PMV BLD AUTO: 11.5 FL
POTASSIUM SERPL-SCNC: 3.8 MMOL/L
PROT SERPL-MCNC: 6.6 G/DL
RBC # BLD AUTO: 4.58 M/UL
SODIUM SERPL-SCNC: 140 MMOL/L
TRIGL SERPL-MCNC: 43 MG/DL
WBC # BLD AUTO: 4.23 K/UL

## 2018-02-16 PROCEDURE — 80053 COMPREHEN METABOLIC PANEL: CPT

## 2018-02-16 PROCEDURE — 85025 COMPLETE CBC W/AUTO DIFF WBC: CPT

## 2018-02-16 PROCEDURE — 36415 COLL VENOUS BLD VENIPUNCTURE: CPT | Mod: PO

## 2018-02-16 PROCEDURE — 80061 LIPID PANEL: CPT

## 2018-02-20 ENCOUNTER — OFFICE VISIT (OUTPATIENT)
Dept: FAMILY MEDICINE | Facility: CLINIC | Age: 68
End: 2018-02-20
Payer: COMMERCIAL

## 2018-02-20 VITALS
TEMPERATURE: 98 F | HEART RATE: 68 BPM | DIASTOLIC BLOOD PRESSURE: 89 MMHG | HEIGHT: 60 IN | SYSTOLIC BLOOD PRESSURE: 112 MMHG | WEIGHT: 118.81 LBS | BODY MASS INDEX: 23.32 KG/M2

## 2018-02-20 DIAGNOSIS — E78.5 HYPERLIPIDEMIA, UNSPECIFIED HYPERLIPIDEMIA TYPE: ICD-10-CM

## 2018-02-20 DIAGNOSIS — I10 HTN (HYPERTENSION), BENIGN: ICD-10-CM

## 2018-02-20 DIAGNOSIS — H10.13 ALLERGIC CONJUNCTIVITIS OF BOTH EYES: ICD-10-CM

## 2018-02-20 DIAGNOSIS — Z12.31 SCREENING MAMMOGRAM, ENCOUNTER FOR: ICD-10-CM

## 2018-02-20 DIAGNOSIS — Z00.00 ROUTINE GENERAL MEDICAL EXAMINATION AT A HEALTH CARE FACILITY: Primary | ICD-10-CM

## 2018-02-20 DIAGNOSIS — H10.13 ALLERGIC CONJUNCTIVITIS, BILATERAL: ICD-10-CM

## 2018-02-20 DIAGNOSIS — M85.80 OSTEOPENIA, UNSPECIFIED LOCATION: ICD-10-CM

## 2018-02-20 PROBLEM — Z12.11 SPECIAL SCREENING FOR MALIGNANT NEOPLASMS, COLON: Status: RESOLVED | Noted: 2017-02-17 | Resolved: 2018-02-20

## 2018-02-20 PROCEDURE — 99999 PR PBB SHADOW E&M-EST. PATIENT-LVL III: CPT | Mod: PBBFAC,,, | Performed by: FAMILY MEDICINE

## 2018-02-20 PROCEDURE — 99397 PER PM REEVAL EST PAT 65+ YR: CPT | Mod: S$GLB,,, | Performed by: FAMILY MEDICINE

## 2018-02-20 RX ORDER — HYDROCHLOROTHIAZIDE 12.5 MG/1
12.5 CAPSULE ORAL DAILY
Qty: 90 CAPSULE | Refills: 3 | Status: SHIPPED | OUTPATIENT
Start: 2018-02-20 | End: 2019-04-09 | Stop reason: SDUPTHER

## 2018-02-20 RX ORDER — ATORVASTATIN CALCIUM 10 MG/1
10 TABLET, FILM COATED ORAL DAILY
Qty: 90 TABLET | Refills: 3 | Status: SHIPPED | OUTPATIENT
Start: 2018-02-20 | End: 2019-04-03 | Stop reason: SDUPTHER

## 2018-02-20 RX ORDER — CROMOLYN SODIUM 40 MG/ML
SOLUTION/ DROPS OPHTHALMIC
Qty: 10 ML | Refills: 12 | Status: SHIPPED | OUTPATIENT
Start: 2018-02-20 | End: 2019-02-26 | Stop reason: SDUPTHER

## 2018-02-20 NOTE — PATIENT INSTRUCTIONS
Read about the Whole 30 diet    =============  Your Bone density test shows OSTEOPENIA - In between normal bone strength and weakened bone strength with osteoporosis.     You are at slightly increased risk for hip fracture, spinal compression fracture in the future, but the experts do not recommend taking a prescription medication at this time    WEIGHT BEARING EXERCISE (carrying light weights) is the BEST way to strengthen the bone where your muscles insert & prevent future fractures.     Focus on 1200 mg of CALCIUM daily  - Which is the equivalent of 3 glasses of milk daily. If you cannot tolerate this, you can substitute yogurt or cheese for one of these servings.  Eat foods rich in calcium.Also you can take TUMS supplements or Viactiv chocolate chews calcium supplement.     Also, 800 units of VITAMIN D daily - This is the equivalent of 10 minutes of direct sunlight daily. If you are not in the sun, consider Foods rich in vitamin D and over the counter  supplement .     Let's repeat this test in 2 years. This is a test that is easily done on the same day as your mammogram      ==============================================================      I'd like to advise you on current CANCER SCREENING recommendations:  ~~~~~~~~~~~~~~~~~~~~~~~~~~~~~~~~~~~~~~~~~~~~~~~~~~~~~~~~~~~~~   If all previous PAP SMEARS have been normal, no current problems, and no family history of female cancers, it is recommended to have Pap smear every 3 years (or after 29 yo, every 5 years with HPV co-testing). Ok to stop pap smears after 64 yo.   MAMMOGRAM  every 1-2 years, from 50 - 74 years old. We can discuss your risk at 40 & determine whether to get mammogram sooner  Of course, I can perform this sooner if there is family history of cancer, or if you have problems or questions    RECOMMENDATIONS FOR FEMALES    Prevent the #1 cause of death- cardiovascular disease (HEART ATTACK & STROKE) by checking for normal blood pressure, cholesterol,  sugars, & by not smoking.     VACCINES: Yearly FLU shot, ONE PNEUMONIA shot after 65, ONE SHINGLES shot after 60    Screening colonoscopy at AGE  50 & every 10 years to check for COLON CANCER,  one of the most common & preventable cancers. Or FIT z12.11, Repeat in 3 years if POLYP found     I recommend  high fiber (5 fresh fruits or vegetables daily), low fat diet and aerobic  exercise (huffing/ puffing/ sweating for 20 min straight at least 4 days a week)    Follow up yearly with mammogram (every 2 years) , fasting lipids, CMP, CBC, TSH prior.   ==============================================================

## 2018-03-23 ENCOUNTER — HOSPITAL ENCOUNTER (OUTPATIENT)
Dept: RADIOLOGY | Facility: HOSPITAL | Age: 68
Discharge: HOME | End: 2018-03-23
Attending: FAMILY MEDICINE
Payer: COMMERCIAL

## 2018-03-23 DIAGNOSIS — Z12.31 SCREENING MAMMOGRAM, ENCOUNTER FOR: ICD-10-CM

## 2018-03-23 PROCEDURE — 77063 BREAST TOMOSYNTHESIS BI: CPT | Mod: 26,,, | Performed by: RADIOLOGY

## 2018-03-23 PROCEDURE — 77067 SCR MAMMO BI INCL CAD: CPT | Mod: TC,PO

## 2018-03-23 PROCEDURE — 77067 SCR MAMMO BI INCL CAD: CPT | Mod: 26,,, | Performed by: RADIOLOGY

## 2018-03-29 ENCOUNTER — PATIENT MESSAGE (OUTPATIENT)
Dept: FAMILY MEDICINE | Facility: CLINIC | Age: 68
End: 2018-03-29

## 2018-03-29 RX ORDER — RISEDRONATE SODIUM 150 MG/1
150 TABLET, FILM COATED ORAL
Qty: 1 TABLET | Refills: 11 | Status: SHIPPED | OUTPATIENT
Start: 2018-03-29 | End: 2019-03-07 | Stop reason: SDUPTHER

## 2018-03-29 RX ORDER — RISEDRONATE SODIUM 35 MG/1
35 TABLET, FILM COATED ORAL
Qty: 4 TABLET | Refills: 11 | Status: SHIPPED | OUTPATIENT
Start: 2018-03-29 | End: 2018-03-29

## 2018-03-29 NOTE — PROGRESS NOTES
Hello!    Your MAMMOGRAM is NORMAL    If all of your previous mammograms have been normal, you are not having problems, and there is no family history of breast cancer, it is recommended to get your Mammogram every 2 years.     Of course we can order it sooner if you have concerns.     Take care.

## 2018-04-03 DIAGNOSIS — E78.5 HYPERLIPIDEMIA, UNSPECIFIED HYPERLIPIDEMIA TYPE: ICD-10-CM

## 2018-04-03 DIAGNOSIS — I10 HTN (HYPERTENSION), BENIGN: ICD-10-CM

## 2018-04-03 RX ORDER — HYDROCHLOROTHIAZIDE 12.5 MG/1
CAPSULE ORAL
Qty: 90 CAPSULE | Refills: 0 | OUTPATIENT
Start: 2018-04-03

## 2018-04-03 RX ORDER — ATORVASTATIN CALCIUM 10 MG/1
TABLET, FILM COATED ORAL
Qty: 90 TABLET | Refills: 0 | OUTPATIENT
Start: 2018-04-03

## 2018-10-23 ENCOUNTER — TELEPHONE (OUTPATIENT)
Dept: FAMILY MEDICINE | Facility: CLINIC | Age: 68
End: 2018-10-23

## 2018-10-23 DIAGNOSIS — Z00.00 ANNUAL PHYSICAL EXAM: Primary | ICD-10-CM

## 2018-10-23 NOTE — TELEPHONE ENCOUNTER
----- Message from Maxi Ring sent at 10/23/2018  4:27 PM CDT -----  Contact: Self /   Doctor appointment and lab have been scheduled.  Please link lab orders to the lab appointment.  Date of doctor appointment:  02/26/2019  Physical or EP:  Physical  Date of lab appointment:  02/19/2019  Comments:

## 2019-02-12 ENCOUNTER — LAB VISIT (OUTPATIENT)
Dept: LAB | Facility: HOSPITAL | Age: 69
End: 2019-02-12
Attending: FAMILY MEDICINE
Payer: COMMERCIAL

## 2019-02-12 DIAGNOSIS — Z00.00 ANNUAL PHYSICAL EXAM: ICD-10-CM

## 2019-02-12 LAB
ALBUMIN SERPL BCP-MCNC: 4 G/DL
ALP SERPL-CCNC: 68 U/L
ALT SERPL W/O P-5'-P-CCNC: 21 U/L
ANION GAP SERPL CALC-SCNC: 7 MMOL/L
AST SERPL-CCNC: 21 U/L
BASOPHILS # BLD AUTO: 0.02 K/UL
BASOPHILS NFR BLD: 0.4 %
BILIRUB SERPL-MCNC: 0.6 MG/DL
BUN SERPL-MCNC: 11 MG/DL
CALCIUM SERPL-MCNC: 9.3 MG/DL
CHLORIDE SERPL-SCNC: 106 MMOL/L
CHOLEST SERPL-MCNC: 143 MG/DL
CHOLEST/HDLC SERPL: 2.1 {RATIO}
CO2 SERPL-SCNC: 30 MMOL/L
CREAT SERPL-MCNC: 0.7 MG/DL
DIFFERENTIAL METHOD: NORMAL
EOSINOPHIL # BLD AUTO: 0.1 K/UL
EOSINOPHIL NFR BLD: 2.1 %
ERYTHROCYTE [DISTWIDTH] IN BLOOD BY AUTOMATED COUNT: 12.2 %
EST. GFR  (AFRICAN AMERICAN): >60 ML/MIN/1.73 M^2
EST. GFR  (NON AFRICAN AMERICAN): >60 ML/MIN/1.73 M^2
GLUCOSE SERPL-MCNC: 84 MG/DL
HCT VFR BLD AUTO: 45.3 %
HDLC SERPL-MCNC: 67 MG/DL
HDLC SERPL: 46.9 %
HGB BLD-MCNC: 14.9 G/DL
IMM GRANULOCYTES # BLD AUTO: 0.01 K/UL
IMM GRANULOCYTES NFR BLD AUTO: 0.2 %
LDLC SERPL CALC-MCNC: 67.4 MG/DL
LYMPHOCYTES # BLD AUTO: 1.8 K/UL
LYMPHOCYTES NFR BLD: 36.5 %
MCH RBC QN AUTO: 30.7 PG
MCHC RBC AUTO-ENTMCNC: 32.9 G/DL
MCV RBC AUTO: 93 FL
MONOCYTES # BLD AUTO: 0.5 K/UL
MONOCYTES NFR BLD: 11.1 %
NEUTROPHILS # BLD AUTO: 2.4 K/UL
NEUTROPHILS NFR BLD: 49.7 %
NONHDLC SERPL-MCNC: 76 MG/DL
NRBC BLD-RTO: 0 /100 WBC
PLATELET # BLD AUTO: 173 K/UL
PMV BLD AUTO: 11.5 FL
POTASSIUM SERPL-SCNC: 3.9 MMOL/L
PROT SERPL-MCNC: 6.6 G/DL
RBC # BLD AUTO: 4.85 M/UL
SODIUM SERPL-SCNC: 143 MMOL/L
TRIGL SERPL-MCNC: 43 MG/DL
WBC # BLD AUTO: 4.79 K/UL

## 2019-02-12 PROCEDURE — 85025 COMPLETE CBC W/AUTO DIFF WBC: CPT

## 2019-02-12 PROCEDURE — 80061 LIPID PANEL: CPT

## 2019-02-12 PROCEDURE — 36415 COLL VENOUS BLD VENIPUNCTURE: CPT | Mod: PO

## 2019-02-12 PROCEDURE — 80053 COMPREHEN METABOLIC PANEL: CPT

## 2019-02-26 ENCOUNTER — OFFICE VISIT (OUTPATIENT)
Dept: FAMILY MEDICINE | Facility: CLINIC | Age: 69
End: 2019-02-26
Payer: COMMERCIAL

## 2019-02-26 VITALS
HEIGHT: 60 IN | SYSTOLIC BLOOD PRESSURE: 138 MMHG | OXYGEN SATURATION: 98 % | WEIGHT: 119.5 LBS | TEMPERATURE: 98 F | BODY MASS INDEX: 23.46 KG/M2 | DIASTOLIC BLOOD PRESSURE: 82 MMHG | HEART RATE: 70 BPM

## 2019-02-26 DIAGNOSIS — I10 HTN (HYPERTENSION), BENIGN: ICD-10-CM

## 2019-02-26 DIAGNOSIS — M25.561 CHRONIC PAIN OF RIGHT KNEE: ICD-10-CM

## 2019-02-26 DIAGNOSIS — H10.13 ALLERGIC CONJUNCTIVITIS, BILATERAL: ICD-10-CM

## 2019-02-26 DIAGNOSIS — Z00.00 ANNUAL PHYSICAL EXAM: Primary | ICD-10-CM

## 2019-02-26 DIAGNOSIS — G89.29 CHRONIC PAIN OF RIGHT KNEE: ICD-10-CM

## 2019-02-26 DIAGNOSIS — M81.0 OSTEOPOROSIS, SENILE: ICD-10-CM

## 2019-02-26 DIAGNOSIS — M71.21 SYNOVIAL CYST OF POPLITEAL SPACE (BAKER), RIGHT KNEE: ICD-10-CM

## 2019-02-26 DIAGNOSIS — Z23 NEED FOR PROPHYLACTIC VACCINATION AND INOCULATION AGAINST INFLUENZA: ICD-10-CM

## 2019-02-26 DIAGNOSIS — H26.9 CATARACT OF LEFT EYE, UNSPECIFIED CATARACT TYPE: ICD-10-CM

## 2019-02-26 DIAGNOSIS — M54.2 NECK PAIN ON LEFT SIDE: ICD-10-CM

## 2019-02-26 PROCEDURE — 99397 PR PREVENTIVE VISIT,EST,65 & OVER: ICD-10-PCS | Mod: 25,S$GLB,, | Performed by: FAMILY MEDICINE

## 2019-02-26 PROCEDURE — 90471 FLU VACCINE - HIGH DOSE (65+) PRESERVATIVE FREE IM: ICD-10-PCS | Mod: S$GLB,,, | Performed by: FAMILY MEDICINE

## 2019-02-26 PROCEDURE — 90662 IIV NO PRSV INCREASED AG IM: CPT | Mod: S$GLB,,, | Performed by: FAMILY MEDICINE

## 2019-02-26 PROCEDURE — 99999 PR PBB SHADOW E&M-EST. PATIENT-LVL V: CPT | Mod: PBBFAC,,, | Performed by: FAMILY MEDICINE

## 2019-02-26 PROCEDURE — 90471 IMMUNIZATION ADMIN: CPT | Mod: S$GLB,,, | Performed by: FAMILY MEDICINE

## 2019-02-26 PROCEDURE — 99999 PR PBB SHADOW E&M-EST. PATIENT-LVL V: ICD-10-PCS | Mod: PBBFAC,,, | Performed by: FAMILY MEDICINE

## 2019-02-26 PROCEDURE — 3075F SYST BP GE 130 - 139MM HG: CPT | Mod: CPTII,S$GLB,, | Performed by: FAMILY MEDICINE

## 2019-02-26 PROCEDURE — 3079F PR MOST RECENT DIASTOLIC BLOOD PRESSURE 80-89 MM HG: ICD-10-PCS | Mod: CPTII,S$GLB,, | Performed by: FAMILY MEDICINE

## 2019-02-26 PROCEDURE — 99397 PER PM REEVAL EST PAT 65+ YR: CPT | Mod: 25,S$GLB,, | Performed by: FAMILY MEDICINE

## 2019-02-26 PROCEDURE — 90662 FLU VACCINE - HIGH DOSE (65+) PRESERVATIVE FREE IM: ICD-10-PCS | Mod: S$GLB,,, | Performed by: FAMILY MEDICINE

## 2019-02-26 PROCEDURE — 3075F PR MOST RECENT SYSTOLIC BLOOD PRESS GE 130-139MM HG: ICD-10-PCS | Mod: CPTII,S$GLB,, | Performed by: FAMILY MEDICINE

## 2019-02-26 PROCEDURE — 3079F DIAST BP 80-89 MM HG: CPT | Mod: CPTII,S$GLB,, | Performed by: FAMILY MEDICINE

## 2019-02-26 RX ORDER — CROMOLYN SODIUM 40 MG/ML
SOLUTION/ DROPS OPHTHALMIC
Qty: 10 ML | Refills: 12 | Status: SHIPPED | OUTPATIENT
Start: 2019-02-26 | End: 2021-02-04

## 2019-02-26 NOTE — PATIENT INSTRUCTIONS
Due for  Vaccines  - flu  =  You can call the [facility], PHYSICAL THERAPY center for an appointment    [address] - [place] [address], right past [employer]  [phone]    They will help diagnose the cause of your problem.     Let me know if your symptoms persist & consider C spine xray & then possibly MRI    ================      Follow with GYN for female health & cancer prevention    ===========================================================  ==============================  RECOMMENDATIONS FOR FEMALES  ==============================  Your #1 MEDICINE is DAILY EXERCISE - 15-20 minutes of huffing & puffing EVERY DAY.     Prevent the #1 cause of death- cardiovascular disease (HEART ATTACK & STROKE) by checking for normal blood pressure, cholesterol, sugars, & by not smoking.     VACCINES: Yearly FLU shot, PNEUMONIA shot after 65,  SHINGLES shot after 50    Screening colonoscopy at AGE  50 & every 10 years to check for COLON CANCER,  one of the most common & preventable cancers (Or FIT kit yearly) Repeat in 3 years if POLYP found     I recommend  high fiber (5 fresh fruits or vegetables daily), low fat diet and aerobic  exercise (huffing/ puffing/ sweating for 20 min straight at least 4 days a week)    Follow up yearly with mammogram, fasting lipids, CMP, CBC prior.   ==============================================================
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
## 2019-02-27 DIAGNOSIS — M25.561 RIGHT KNEE PAIN, UNSPECIFIED CHRONICITY: Primary | ICD-10-CM

## 2019-03-04 ENCOUNTER — OFFICE VISIT (OUTPATIENT)
Dept: ORTHOPEDICS | Facility: CLINIC | Age: 69
End: 2019-03-04
Payer: COMMERCIAL

## 2019-03-04 ENCOUNTER — HOSPITAL ENCOUNTER (OUTPATIENT)
Dept: RADIOLOGY | Facility: HOSPITAL | Age: 69
Discharge: HOME | End: 2019-03-04
Attending: ORTHOPAEDIC SURGERY
Payer: COMMERCIAL

## 2019-03-04 VITALS — BODY MASS INDEX: 23.16 KG/M2 | HEIGHT: 60 IN | WEIGHT: 117.94 LBS

## 2019-03-04 DIAGNOSIS — M71.21 SYNOVIAL CYST OF POPLITEAL SPACE (BAKER), RIGHT KNEE: Primary | ICD-10-CM

## 2019-03-04 DIAGNOSIS — G89.29 CHRONIC PAIN OF RIGHT KNEE: ICD-10-CM

## 2019-03-04 DIAGNOSIS — M25.561 RIGHT KNEE PAIN, UNSPECIFIED CHRONICITY: ICD-10-CM

## 2019-03-04 DIAGNOSIS — M25.561 CHRONIC PAIN OF RIGHT KNEE: ICD-10-CM

## 2019-03-04 PROCEDURE — 73562 XR KNEE ORTHO RIGHT WITH FLEXION: ICD-10-PCS | Mod: 26,XS,RT, | Performed by: RADIOLOGY

## 2019-03-04 PROCEDURE — 73564 X-RAY EXAM KNEE 4 OR MORE: CPT | Mod: 26,RT,, | Performed by: RADIOLOGY

## 2019-03-04 PROCEDURE — 99203 PR OFFICE/OUTPT VISIT, NEW, LEVL III, 30-44 MIN: ICD-10-PCS | Mod: S$GLB,,, | Performed by: ORTHOPAEDIC SURGERY

## 2019-03-04 PROCEDURE — 99999 PR PBB SHADOW E&M-EST. PATIENT-LVL III: ICD-10-PCS | Mod: PBBFAC,,, | Performed by: ORTHOPAEDIC SURGERY

## 2019-03-04 PROCEDURE — 73562 X-RAY EXAM OF KNEE 3: CPT | Mod: TC,RT

## 2019-03-04 PROCEDURE — 1101F PR PT FALLS ASSESS DOC 0-1 FALLS W/OUT INJ PAST YR: ICD-10-PCS | Mod: CPTII,S$GLB,, | Performed by: ORTHOPAEDIC SURGERY

## 2019-03-04 PROCEDURE — 1101F PT FALLS ASSESS-DOCD LE1/YR: CPT | Mod: CPTII,S$GLB,, | Performed by: ORTHOPAEDIC SURGERY

## 2019-03-04 PROCEDURE — 99999 PR PBB SHADOW E&M-EST. PATIENT-LVL III: CPT | Mod: PBBFAC,,, | Performed by: ORTHOPAEDIC SURGERY

## 2019-03-04 PROCEDURE — 73564 XR KNEE ORTHO RIGHT WITH FLEXION: ICD-10-PCS | Mod: 26,RT,, | Performed by: RADIOLOGY

## 2019-03-04 PROCEDURE — 73562 X-RAY EXAM OF KNEE 3: CPT | Mod: 26,XS,RT, | Performed by: RADIOLOGY

## 2019-03-04 PROCEDURE — 99203 OFFICE O/P NEW LOW 30 MIN: CPT | Mod: S$GLB,,, | Performed by: ORTHOPAEDIC SURGERY

## 2019-03-04 NOTE — LETTER
March 4, 2019      Cindi Wolf MD  [address] [facility]  [address]  [facility] [address]           [facility] - Orthopedics  [address], [address]th [address]  [place] [address]  Phone: [phone]          Patient: Yuliana Soares   MR Number: [id number]   YOB: 1950   Date of Visit: 3/4/2019       Dear Dr. Cindi Wolf:    Thank you for referring Yuliana Soares to me for evaluation. Attached you will find relevant portions of my assessment and plan of care.    If you have questions, please do not hesitate to call me. I look forward to following Yuliana Soares along with you.    Sincerely,    Jesús Cano III, MD    Enclosure  CC:  No Recipients    If you would like to receive this communication electronically, please contact [email or web address] or [phone] to request more information on [facility] Link access.    For providers and/or their staff who would like to refer a patient to [facility], please contact us through our one-stop-shop provider referral line, [facility], at [phone].    If you feel you have received this communication in error or would no longer like to receive these types of communications, please e-mail [email or web address]
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
## 2019-03-07 RX ORDER — RISEDRONATE SODIUM 150 MG/1
TABLET, FILM COATED ORAL
Qty: 3 TABLET | Refills: 3 | Status: SHIPPED | OUTPATIENT
Start: 2019-03-07 | End: 2020-01-15 | Stop reason: SDUPTHER

## 2019-03-21 ENCOUNTER — HOSPITAL ENCOUNTER (OUTPATIENT)
Dept: RADIOLOGY | Facility: HOSPITAL | Age: 69
Discharge: HOME | End: 2019-03-21
Attending: ORTHOPAEDIC SURGERY
Payer: COMMERCIAL

## 2019-03-21 DIAGNOSIS — M25.561 CHRONIC PAIN OF RIGHT KNEE: ICD-10-CM

## 2019-03-21 DIAGNOSIS — G89.29 CHRONIC PAIN OF RIGHT KNEE: ICD-10-CM

## 2019-03-21 DIAGNOSIS — M71.21 SYNOVIAL CYST OF POPLITEAL SPACE (BAKER), RIGHT KNEE: ICD-10-CM

## 2019-03-21 PROCEDURE — 73721 MRI KNEE WITHOUT CONTRAST RIGHT: ICD-10-PCS | Mod: 26,RT,, | Performed by: RADIOLOGY

## 2019-03-21 PROCEDURE — 73721 MRI JNT OF LWR EXTRE W/O DYE: CPT | Mod: TC,RT

## 2019-03-21 PROCEDURE — 73721 MRI JNT OF LWR EXTRE W/O DYE: CPT | Mod: 26,RT,, | Performed by: RADIOLOGY

## 2019-03-25 ENCOUNTER — PATIENT MESSAGE (OUTPATIENT)
Dept: ORTHOPEDICS | Facility: CLINIC | Age: 69
End: 2019-03-25

## 2019-03-28 ENCOUNTER — OFFICE VISIT (OUTPATIENT)
Dept: ORTHOPEDICS | Facility: CLINIC | Age: 69
End: 2019-03-28
Payer: COMMERCIAL

## 2019-03-28 VITALS — HEIGHT: 59 IN | BODY MASS INDEX: 23.93 KG/M2 | WEIGHT: 118.69 LBS

## 2019-03-28 DIAGNOSIS — M71.21 BAKER CYST, RIGHT: Primary | ICD-10-CM

## 2019-03-28 DIAGNOSIS — S83.231A COMPLEX TEAR OF MEDIAL MENISCUS OF RIGHT KNEE AS CURRENT INJURY, INITIAL ENCOUNTER: ICD-10-CM

## 2019-03-28 PROCEDURE — 1101F PR PT FALLS ASSESS DOC 0-1 FALLS W/OUT INJ PAST YR: ICD-10-PCS | Mod: CPTII,S$GLB,, | Performed by: ORTHOPAEDIC SURGERY

## 2019-03-28 PROCEDURE — 99999 PR PBB SHADOW E&M-EST. PATIENT-LVL III: CPT | Mod: PBBFAC,,, | Performed by: ORTHOPAEDIC SURGERY

## 2019-03-28 PROCEDURE — 20610 LARGE JOINT ASPIRATION/INJECTION: R KNEE: ICD-10-PCS | Mod: RT,S$GLB,, | Performed by: ORTHOPAEDIC SURGERY

## 2019-03-28 PROCEDURE — 99999 PR PBB SHADOW E&M-EST. PATIENT-LVL III: ICD-10-PCS | Mod: PBBFAC,,, | Performed by: ORTHOPAEDIC SURGERY

## 2019-03-28 PROCEDURE — 99212 OFFICE O/P EST SF 10 MIN: CPT | Mod: 25,S$GLB,, | Performed by: ORTHOPAEDIC SURGERY

## 2019-03-28 PROCEDURE — 20610 DRAIN/INJ JOINT/BURSA W/O US: CPT | Mod: RT,S$GLB,, | Performed by: ORTHOPAEDIC SURGERY

## 2019-03-28 PROCEDURE — 99212 PR OFFICE/OUTPT VISIT, EST, LEVL II, 10-19 MIN: ICD-10-PCS | Mod: 25,S$GLB,, | Performed by: ORTHOPAEDIC SURGERY

## 2019-03-28 PROCEDURE — 1101F PT FALLS ASSESS-DOCD LE1/YR: CPT | Mod: CPTII,S$GLB,, | Performed by: ORTHOPAEDIC SURGERY

## 2019-03-28 RX ADMIN — TRIAMCINOLONE ACETONIDE 40 MG: 40 INJECTION, SUSPENSION INTRA-ARTICULAR; INTRAMUSCULAR at 03:03
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
## 2019-04-01 RX ORDER — TRIAMCINOLONE ACETONIDE 40 MG/ML
40 INJECTION, SUSPENSION INTRA-ARTICULAR; INTRAMUSCULAR
Status: DISCONTINUED | OUTPATIENT
Start: 2019-04-01 | End: 2022-02-08

## 2019-04-01 RX ADMIN — TRIAMCINOLONE ACETONIDE 40 MG: 40 INJECTION, SUSPENSION INTRA-ARTICULAR; INTRAMUSCULAR at 08:04

## 2019-04-01 NOTE — PROCEDURES
Large Joint Aspiration/Injection: R knee  Date/Time: 4/1/2019 8:16 AM  Performed by: Jesús Cano III, MD  Authorized by: Jesús Cano III, MD     Consent Done?:  Yes (Verbal)  Indications:  Pain  Timeout: Prior to procedure the correct patient, procedure, and site was verified      Location:  Knee  Site:  R knee  Prep: Patient was prepped and draped in usual sterile fashion    Needle size:  21 G  Medications:  40 mg triamcinolone acetonide 40 mg/mL  Patient tolerance:  Patient tolerated the procedure well with no immediate complications

## 2019-04-02 ENCOUNTER — TELEPHONE (OUTPATIENT)
Dept: PAIN MEDICINE | Facility: CLINIC | Age: 69
End: 2019-04-02

## 2019-04-02 RX ORDER — TRIAMCINOLONE ACETONIDE 40 MG/ML
40 INJECTION, SUSPENSION INTRA-ARTICULAR; INTRAMUSCULAR
Status: DISCONTINUED | OUTPATIENT
Start: 2019-03-28 | End: 2022-02-08

## 2019-04-02 NOTE — PROCEDURES
Large Joint Aspiration/Injection: R knee  Date/Time: 3/28/2019 3:00 PM  Performed by: Jesús Cano III, MD  Authorized by: Jesús Cano III, MD     Consent Done?:  Yes (Verbal)  Indications:  Pain  Timeout: Prior to procedure the correct patient, procedure, and site was verified      Location:  Knee  Site:  R knee  Prep: Patient was prepped and draped in usual sterile fashion    Needle size:  21 G  Medications:  40 mg triamcinolone acetonide 40 mg/mL  Patient tolerance:  Patient tolerated the procedure well with no immediate complications

## 2019-04-03 ENCOUNTER — TELEPHONE (OUTPATIENT)
Dept: PAIN MEDICINE | Facility: CLINIC | Age: 69
End: 2019-04-03

## 2019-04-03 DIAGNOSIS — E78.5 HYPERLIPIDEMIA, UNSPECIFIED HYPERLIPIDEMIA TYPE: ICD-10-CM

## 2019-04-03 RX ORDER — ATORVASTATIN CALCIUM 10 MG/1
TABLET, FILM COATED ORAL
Qty: 90 TABLET | Refills: 3 | Status: SHIPPED | OUTPATIENT
Start: 2019-04-03 | End: 2020-01-15 | Stop reason: SDUPTHER

## 2019-04-04 ENCOUNTER — TELEPHONE (OUTPATIENT)
Dept: PAIN MEDICINE | Facility: CLINIC | Age: 69
End: 2019-04-04

## 2019-04-04 NOTE — TELEPHONE ENCOUNTER
Attempted to contact patient numerous times on 4/2, 4/3, and 4/4 to schedule procedure, with no answer. Left multiple messages with no return call.

## 2019-04-09 DIAGNOSIS — I10 HTN (HYPERTENSION), BENIGN: ICD-10-CM

## 2019-04-09 RX ORDER — HYDROCHLOROTHIAZIDE 12.5 MG/1
CAPSULE ORAL
Qty: 90 CAPSULE | Refills: 3 | Status: SHIPPED | OUTPATIENT
Start: 2019-04-09 | End: 2020-01-15 | Stop reason: SDUPTHER

## 2019-09-04 ENCOUNTER — OFFICE VISIT (OUTPATIENT)
Dept: OPHTHALMOLOGY | Facility: CLINIC | Age: 69
End: 2019-09-04
Payer: COMMERCIAL

## 2019-09-04 DIAGNOSIS — Z98.890 HISTORY OF RADIAL KERATOTOMY: Primary | ICD-10-CM

## 2019-09-04 DIAGNOSIS — H25.13 NUCLEAR SCLEROSIS, BILATERAL: ICD-10-CM

## 2019-09-04 PROCEDURE — 99999 PR PBB SHADOW E&M-EST. PATIENT-LVL III: CPT | Mod: PBBFAC,,, | Performed by: OPHTHALMOLOGY

## 2019-09-04 PROCEDURE — 92004 COMPRE OPH EXAM NEW PT 1/>: CPT | Mod: S$GLB,,, | Performed by: OPHTHALMOLOGY

## 2019-09-04 PROCEDURE — 92004 PR EYE EXAM, NEW PATIENT,COMPREHESV: ICD-10-PCS | Mod: S$GLB,,, | Performed by: OPHTHALMOLOGY

## 2019-09-04 PROCEDURE — 99999 PR PBB SHADOW E&M-EST. PATIENT-LVL III: ICD-10-PCS | Mod: PBBFAC,,, | Performed by: OPHTHALMOLOGY

## 2019-09-04 NOTE — PROGRESS NOTES
HPI     dls 2 yrs    RK OU 1981    OPTICROM OU PRN  CLEAR EYES PRN    +decreased vision distance and near  +decreased color os certain spot  +itchy, watery- allergies has been taking zyrtec    Last edited by Ophelia Recinos on 9/4/2019 10:27 AM. (History)            Assessment /Plan     For exam results, see Encounter Report.    History of radial keratotomy    Nuclear sclerosis, bilateral      Incipient cataract: Patient does reports mild visual decline, but not sufficient to affect activities of daily living. I recommend monitoring visual status and follow up when visual symptoms worsen.  Hx RK so recommend conservative monitoring at this time.  OD with regular CYL and good contour, OS with deep central flattening.  IOL calcs done today.

## 2019-10-12 ENCOUNTER — OFFICE VISIT (OUTPATIENT)
Dept: URGENT CARE | Facility: CLINIC | Age: 69
End: 2019-10-12
Payer: COMMERCIAL

## 2019-10-12 VITALS
WEIGHT: 118 LBS | BODY MASS INDEX: 23.79 KG/M2 | RESPIRATION RATE: 18 BRPM | OXYGEN SATURATION: 96 % | DIASTOLIC BLOOD PRESSURE: 85 MMHG | HEART RATE: 81 BPM | TEMPERATURE: 99 F | HEIGHT: 59 IN | SYSTOLIC BLOOD PRESSURE: 127 MMHG

## 2019-10-12 DIAGNOSIS — J06.9 VIRAL URI WITH COUGH: Primary | ICD-10-CM

## 2019-10-12 PROCEDURE — 1101F PR PT FALLS ASSESS DOC 0-1 FALLS W/OUT INJ PAST YR: ICD-10-PCS | Mod: CPTII,S$GLB,, | Performed by: FAMILY MEDICINE

## 2019-10-12 PROCEDURE — 3074F PR MOST RECENT SYSTOLIC BLOOD PRESSURE < 130 MM HG: ICD-10-PCS | Mod: CPTII,S$GLB,, | Performed by: FAMILY MEDICINE

## 2019-10-12 PROCEDURE — 1101F PT FALLS ASSESS-DOCD LE1/YR: CPT | Mod: CPTII,S$GLB,, | Performed by: FAMILY MEDICINE

## 2019-10-12 PROCEDURE — 99214 OFFICE O/P EST MOD 30 MIN: CPT | Mod: S$GLB,,, | Performed by: FAMILY MEDICINE

## 2019-10-12 PROCEDURE — 99214 PR OFFICE/OUTPT VISIT, EST, LEVL IV, 30-39 MIN: ICD-10-PCS | Mod: S$GLB,,, | Performed by: FAMILY MEDICINE

## 2019-10-12 PROCEDURE — 3079F DIAST BP 80-89 MM HG: CPT | Mod: CPTII,S$GLB,, | Performed by: FAMILY MEDICINE

## 2019-10-12 PROCEDURE — 3074F SYST BP LT 130 MM HG: CPT | Mod: CPTII,S$GLB,, | Performed by: FAMILY MEDICINE

## 2019-10-12 PROCEDURE — 3079F PR MOST RECENT DIASTOLIC BLOOD PRESSURE 80-89 MM HG: ICD-10-PCS | Mod: CPTII,S$GLB,, | Performed by: FAMILY MEDICINE

## 2019-10-12 RX ORDER — BENZONATATE 200 MG/1
200 CAPSULE ORAL 3 TIMES DAILY PRN
Qty: 30 CAPSULE | Refills: 0 | Status: SHIPPED | OUTPATIENT
Start: 2019-10-12 | End: 2021-02-04

## 2019-10-12 NOTE — PATIENT INSTRUCTIONS
PLEASE READ YOUR DISCHARGE INSTRUCTIONS ENTIRELY AS IT CONTAINS IMPORTANT INFORMATION.      Please drink plenty of fluids.    Please get plenty of rest.    Please return here or go to the Emergency Department for any concerns or worsening of condition.    Can continue DayQuil NyQuil    Try over-the-counter plain Mucinex    If not allergic, please take over the counter Tylenol (Acetaminophen) and/or Motrin (Ibuprofen) as directed for control of pain and/or fever.  Please follow up with your primary care doctor or specialist as needed.    Sore throat recommendations: Warm fluids, warm salt water gargles, throat lozenges, tea, honey, soup, rest, hydration.    Use over the counter flonase: one spray each nostril twice daily OR two sprays each nostril once daily.     If you  smoke, please stop smoking.      Please return or see your primary care doctor if you develop new or worsening symptoms.     Please arrange follow up with your primary medical clinic as soon as possible. You must understand that you've received an Urgent Care treatment only and that you may be released before all of your medical problems are known or treated. You, the patient, will arrange for follow up as instructed. If your symptoms worsen or fail to improve you should go to the Emergency Room.    Viral Upper Respiratory Illness (Adult)  You have a viral upper respiratory illness (URI), which is another term for the common cold. This illness is contagious during the first few days. It is spread through the air by coughing and sneezing. It may also be spread by direct contact (touching the sick person and then touching your own eyes, nose, or mouth). Frequent handwashing will decrease risk of spread. Most viral illnesses go away within 7 to 10 days with rest and simple home remedies. Sometimes the illness may last for several weeks. Antibiotics will not kill a virus, and they are generally not prescribed for this condition.    Home care  · If  symptoms are severe, rest at home for the first 2 to 3 days. When you resume activity, don't let yourself get too tired.  · Avoid being exposed to cigarette smoke (yours or others).  · You may use acetaminophen or ibuprofen to control pain and fever, unless another medicine was prescribed. (Note: If you have chronic liver or kidney disease, have ever had a stomach ulcer or gastrointestinal bleeding, or are taking blood-thinning medicines, talk with your healthcare provider before using these medicines.) Aspirin should never be given to anyone under 18 years of age who is ill with a viral infection or fever. It may cause severe liver or brain damage.  · Your appetite may be poor, so a light diet is fine. Avoid dehydration by drinking 6 to 8 glasses of fluids per day (water, soft drinks, juices, tea, or soup). Extra fluids will help loosen secretions in the nose and lungs.  · Over-the-counter cold medicines will not shorten the length of time youre sick, but they may be helpful for the following symptoms: cough, sore throat, and nasal and sinus congestion. (Note: Do not use decongestants if you have high blood pressure.)  Follow-up care  Follow up with your healthcare provider, or as advised.  When to seek medical advice  Call your healthcare provider right away if any of these occur:  · Cough with lots of colored sputum (mucus)  · Severe headache; face, neck, or ear pain  · Difficulty swallowing due to throat pain  · Fever of 100.4°F (38°C)  Call 911, or get immediate medical care  Call emergency services right away if any of these occur:  · Chest pain, shortness of breath, wheezing, or difficulty breathing  · Coughing up blood  · Inability to swallow due to throat pain  Date Last Reviewed: 9/13/2015  © 8583-6717 [employer] [address] [place] [address], [place] [address]. All rights reserved. This information is not intended as a substitute for professional medical care. Always follow your healthcare  professional's instructions.

## 2019-10-12 NOTE — PROGRESS NOTES
"Subjective:       Patient ID: Yuliana Soares is a 69 y.o. female.    Vitals:    10/12/19 1427   BP: 127/85   Pulse: 81   Resp: 18   Temp: 98.9 °F (37.2 °C)   SpO2: 96%   Weight: 53.5 kg (118 lb)   Height: 4' 11" (1.499 m)       Chief Complaint: Nasal Congestion and Cough    Nasal congestion cough for the past 7 days no fever.  Not worsening does not improving.  Taking DayQuil and NyQuil    Cough   This is a new problem. The current episode started in the past 7 days. The problem has been gradually worsening. The problem occurs every few minutes. The cough is non-productive. Associated symptoms include nasal congestion and a sore throat. Pertinent negatives include no chest pain, chills, ear pain, eye redness, fever, headaches, myalgias, shortness of breath or wheezing. Nothing aggravates the symptoms. She has tried OTC cough suppressant (last night ) for the symptoms. The treatment provided mild relief. There is no history of asthma, bronchitis or pneumonia.     Review of Systems   Constitution: Negative for chills, fever and malaise/fatigue.   HENT: Positive for congestion and sore throat. Negative for ear pain and hoarse voice.    Eyes: Negative for discharge and redness.   Cardiovascular: Negative for chest pain, dyspnea on exertion and leg swelling.   Respiratory: Positive for cough. Negative for shortness of breath, sputum production and wheezing.    Musculoskeletal: Negative for myalgias.   Gastrointestinal: Negative for abdominal pain and nausea.   Neurological: Negative for headaches.       Objective:      Physical Exam   Constitutional: She is oriented to person, place, and time. She appears well-developed and well-nourished. She is cooperative.  Non-toxic appearance. She does not appear ill. No distress.   HENT:   Head: Normocephalic and atraumatic.   Right Ear: Hearing, tympanic membrane, external ear and ear canal normal. No middle ear effusion.   Left Ear: Hearing, tympanic membrane, external ear " and ear canal normal.  No middle ear effusion.   Nose: Mucosal edema present. No rhinorrhea or nasal deformity. No epistaxis. Right sinus exhibits no maxillary sinus tenderness and no frontal sinus tenderness. Left sinus exhibits no maxillary sinus tenderness and no frontal sinus tenderness.   Mouth/Throat: Uvula is midline, oropharynx is clear and moist and mucous membranes are normal. No trismus in the jaw. Normal dentition. No uvula swelling. No oropharyngeal exudate or posterior oropharyngeal erythema.   Eyes: Conjunctivae and lids are normal. No scleral icterus.   Sclera clear bilat   Neck: Trachea normal, full passive range of motion without pain and phonation normal. Neck supple.   Cardiovascular: Normal rate, regular rhythm, normal heart sounds, intact distal pulses and normal pulses.   Pulmonary/Chest: Effort normal and breath sounds normal. No accessory muscle usage. No tachypnea. No respiratory distress. She has no decreased breath sounds. She has no wheezes. She has no rhonchi. She has no rales.   Abdominal: Soft. Normal appearance and bowel sounds are normal. She exhibits no distension. There is no tenderness.   Musculoskeletal: Normal range of motion. She exhibits no edema or deformity.   Neurological: She is alert and oriented to person, place, and time. She exhibits normal muscle tone. Coordination normal.   Skin: Skin is warm, dry and intact. She is not diaphoretic. No pallor.   Psychiatric: She has a normal mood and affect. Her speech is normal and behavior is normal. Judgment and thought content normal. Cognition and memory are normal.   Nursing note and vitals reviewed.      Assessment:       1. Viral URI with cough        Plan:       Yuliana was seen today for nasal congestion and cough.    Diagnoses and all orders for this visit:    Viral URI with cough  -     benzonatate (TESSALON) 200 MG capsule; Take 1 capsule (200 mg total) by mouth 3 (three) times daily as needed for Cough.      Patient  Instructions   PLEASE READ YOUR DISCHARGE INSTRUCTIONS ENTIRELY AS IT CONTAINS IMPORTANT INFORMATION.      Please drink plenty of fluids.    Please get plenty of rest.    Please return here or go to the Emergency Department for any concerns or worsening of condition.    Can continue DayQuil NyQuil    Try over-the-counter plain Mucinex    If not allergic, please take over the counter Tylenol (Acetaminophen) and/or Motrin (Ibuprofen) as directed for control of pain and/or fever.  Please follow up with your primary care doctor or specialist as needed.    Sore throat recommendations: Warm fluids, warm salt water gargles, throat lozenges, tea, honey, soup, rest, hydration.    Use over the counter flonase: one spray each nostril twice daily OR two sprays each nostril once daily.     If you  smoke, please stop smoking.      Please return or see your primary care doctor if you develop new or worsening symptoms.     Please arrange follow up with your primary medical clinic as soon as possible. You must understand that you've received an Urgent Care treatment only and that you may be released before all of your medical problems are known or treated. You, the patient, will arrange for follow up as instructed. If your symptoms worsen or fail to improve you should go to the Emergency Room.    Viral Upper Respiratory Illness (Adult)  You have a viral upper respiratory illness (URI), which is another term for the common cold. This illness is contagious during the first few days. It is spread through the air by coughing and sneezing. It may also be spread by direct contact (touching the sick person and then touching your own eyes, nose, or mouth). Frequent handwashing will decrease risk of spread. Most viral illnesses go away within 7 to 10 days with rest and simple home remedies. Sometimes the illness may last for several weeks. Antibiotics will not kill a virus, and they are generally not prescribed for this condition.    Home  care  · If symptoms are severe, rest at home for the first 2 to 3 days. When you resume activity, don't let yourself get too tired.  · Avoid being exposed to cigarette smoke (yours or others).  · You may use acetaminophen or ibuprofen to control pain and fever, unless another medicine was prescribed. (Note: If you have chronic liver or kidney disease, have ever had a stomach ulcer or gastrointestinal bleeding, or are taking blood-thinning medicines, talk with your healthcare provider before using these medicines.) Aspirin should never be given to anyone under 18 years of age who is ill with a viral infection or fever. It may cause severe liver or brain damage.  · Your appetite may be poor, so a light diet is fine. Avoid dehydration by drinking 6 to 8 glasses of fluids per day (water, soft drinks, juices, tea, or soup). Extra fluids will help loosen secretions in the nose and lungs.  · Over-the-counter cold medicines will not shorten the length of time youre sick, but they may be helpful for the following symptoms: cough, sore throat, and nasal and sinus congestion. (Note: Do not use decongestants if you have high blood pressure.)  Follow-up care  Follow up with your healthcare provider, or as advised.  When to seek medical advice  Call your healthcare provider right away if any of these occur:  · Cough with lots of colored sputum (mucus)  · Severe headache; face, neck, or ear pain  · Difficulty swallowing due to throat pain  · Fever of 100.4°F (38°C)  Call 911, or get immediate medical care  Call emergency services right away if any of these occur:  · Chest pain, shortness of breath, wheezing, or difficulty breathing  · Coughing up blood  · Inability to swallow due to throat pain  Date Last Reviewed: 9/13/2015  © 0086-8725 [employer]. [address] [place] [address], [place] [address]. All rights reserved. This information is not intended as a substitute for professional medical care. Always follow your  healthcare professional's instructions.

## 2019-10-18 DIAGNOSIS — Z00.00 ANNUAL PHYSICAL EXAM: Primary | ICD-10-CM

## 2020-01-10 ENCOUNTER — LAB VISIT (OUTPATIENT)
Dept: LAB | Facility: HOSPITAL | Age: 70
End: 2020-01-10
Payer: COMMERCIAL

## 2020-01-10 DIAGNOSIS — Z00.00 ANNUAL PHYSICAL EXAM: ICD-10-CM

## 2020-01-10 LAB
ALBUMIN SERPL BCP-MCNC: 4.1 G/DL (ref 3.5–5.2)
ALP SERPL-CCNC: 59 U/L (ref 55–135)
ALT SERPL W/O P-5'-P-CCNC: 21 U/L (ref 10–44)
ANION GAP SERPL CALC-SCNC: 6 MMOL/L (ref 8–16)
AST SERPL-CCNC: 22 U/L (ref 10–40)
BASOPHILS # BLD AUTO: 0.02 K/UL (ref 0–0.2)
BASOPHILS NFR BLD: 0.5 % (ref 0–1.9)
BILIRUB SERPL-MCNC: 0.5 MG/DL (ref 0.1–1)
BUN SERPL-MCNC: 11 MG/DL (ref 8–23)
CALCIUM SERPL-MCNC: 9.8 MG/DL (ref 8.7–10.5)
CHLORIDE SERPL-SCNC: 106 MMOL/L (ref 95–110)
CHOLEST SERPL-MCNC: 162 MG/DL (ref 120–199)
CHOLEST/HDLC SERPL: 2.5 {RATIO} (ref 2–5)
CO2 SERPL-SCNC: 31 MMOL/L (ref 23–29)
CREAT SERPL-MCNC: 0.7 MG/DL (ref 0.5–1.4)
DIFFERENTIAL METHOD: ABNORMAL
EOSINOPHIL # BLD AUTO: 0.1 K/UL (ref 0–0.5)
EOSINOPHIL NFR BLD: 3.4 % (ref 0–8)
ERYTHROCYTE [DISTWIDTH] IN BLOOD BY AUTOMATED COUNT: 12.3 % (ref 11.5–14.5)
EST. GFR  (AFRICAN AMERICAN): >60 ML/MIN/1.73 M^2
EST. GFR  (NON AFRICAN AMERICAN): >60 ML/MIN/1.73 M^2
GLUCOSE SERPL-MCNC: 83 MG/DL (ref 70–110)
HCT VFR BLD AUTO: 48.4 % (ref 37–48.5)
HDLC SERPL-MCNC: 66 MG/DL (ref 40–75)
HDLC SERPL: 40.7 % (ref 20–50)
HGB BLD-MCNC: 15.2 G/DL (ref 12–16)
IMM GRANULOCYTES # BLD AUTO: 0.01 K/UL (ref 0–0.04)
IMM GRANULOCYTES NFR BLD AUTO: 0.2 % (ref 0–0.5)
LDLC SERPL CALC-MCNC: 82.2 MG/DL (ref 63–159)
LYMPHOCYTES # BLD AUTO: 1.9 K/UL (ref 1–4.8)
LYMPHOCYTES NFR BLD: 47.1 % (ref 18–48)
MCH RBC QN AUTO: 31 PG (ref 27–31)
MCHC RBC AUTO-ENTMCNC: 31.4 G/DL (ref 32–36)
MCV RBC AUTO: 99 FL (ref 82–98)
MONOCYTES # BLD AUTO: 0.3 K/UL (ref 0.3–1)
MONOCYTES NFR BLD: 7.8 % (ref 4–15)
NEUTROPHILS # BLD AUTO: 1.7 K/UL (ref 1.8–7.7)
NEUTROPHILS NFR BLD: 41 % (ref 38–73)
NONHDLC SERPL-MCNC: 96 MG/DL
NRBC BLD-RTO: 0 /100 WBC
PLATELET # BLD AUTO: 191 K/UL (ref 150–350)
PMV BLD AUTO: 11.5 FL (ref 9.2–12.9)
POTASSIUM SERPL-SCNC: 4.5 MMOL/L (ref 3.5–5.1)
PROT SERPL-MCNC: 6.9 G/DL (ref 6–8.4)
RBC # BLD AUTO: 4.91 M/UL (ref 4–5.4)
SODIUM SERPL-SCNC: 143 MMOL/L (ref 136–145)
TRIGL SERPL-MCNC: 69 MG/DL (ref 30–150)
WBC # BLD AUTO: 4.1 K/UL (ref 3.9–12.7)

## 2020-01-10 PROCEDURE — 80061 LIPID PANEL: CPT

## 2020-01-10 PROCEDURE — 85025 COMPLETE CBC W/AUTO DIFF WBC: CPT

## 2020-01-10 PROCEDURE — 36415 COLL VENOUS BLD VENIPUNCTURE: CPT | Mod: PN

## 2020-01-10 PROCEDURE — 80053 COMPREHEN METABOLIC PANEL: CPT

## 2020-01-15 ENCOUNTER — OFFICE VISIT (OUTPATIENT)
Dept: PRIMARY CARE CLINIC | Facility: CLINIC | Age: 70
End: 2020-01-15
Payer: COMMERCIAL

## 2020-01-15 VITALS
DIASTOLIC BLOOD PRESSURE: 87 MMHG | SYSTOLIC BLOOD PRESSURE: 122 MMHG | WEIGHT: 119.06 LBS | BODY MASS INDEX: 23.37 KG/M2 | TEMPERATURE: 98 F | HEART RATE: 68 BPM | HEIGHT: 60 IN

## 2020-01-15 DIAGNOSIS — J06.9 UPPER RESPIRATORY TRACT INFECTION, UNSPECIFIED TYPE: ICD-10-CM

## 2020-01-15 DIAGNOSIS — I10 HTN (HYPERTENSION), BENIGN: Primary | ICD-10-CM

## 2020-01-15 DIAGNOSIS — E78.2 MIXED HYPERLIPIDEMIA: ICD-10-CM

## 2020-01-15 DIAGNOSIS — M81.0 OSTEOPOROSIS, SENILE: ICD-10-CM

## 2020-01-15 DIAGNOSIS — E78.5 HYPERLIPIDEMIA, UNSPECIFIED HYPERLIPIDEMIA TYPE: ICD-10-CM

## 2020-01-15 PROCEDURE — 1159F MED LIST DOCD IN RCRD: CPT | Mod: S$GLB,,, | Performed by: FAMILY MEDICINE

## 2020-01-15 PROCEDURE — 3079F PR MOST RECENT DIASTOLIC BLOOD PRESSURE 80-89 MM HG: ICD-10-PCS | Mod: CPTII,S$GLB,, | Performed by: FAMILY MEDICINE

## 2020-01-15 PROCEDURE — 3074F SYST BP LT 130 MM HG: CPT | Mod: CPTII,S$GLB,, | Performed by: FAMILY MEDICINE

## 2020-01-15 PROCEDURE — 3074F PR MOST RECENT SYSTOLIC BLOOD PRESSURE < 130 MM HG: ICD-10-PCS | Mod: CPTII,S$GLB,, | Performed by: FAMILY MEDICINE

## 2020-01-15 PROCEDURE — 3079F DIAST BP 80-89 MM HG: CPT | Mod: CPTII,S$GLB,, | Performed by: FAMILY MEDICINE

## 2020-01-15 PROCEDURE — 99214 OFFICE O/P EST MOD 30 MIN: CPT | Mod: S$GLB,,, | Performed by: FAMILY MEDICINE

## 2020-01-15 PROCEDURE — 1159F PR MEDICATION LIST DOCUMENTED IN MEDICAL RECORD: ICD-10-PCS | Mod: S$GLB,,, | Performed by: FAMILY MEDICINE

## 2020-01-15 PROCEDURE — 99214 PR OFFICE/OUTPT VISIT, EST, LEVL IV, 30-39 MIN: ICD-10-PCS | Mod: S$GLB,,, | Performed by: FAMILY MEDICINE

## 2020-01-15 PROCEDURE — 99999 PR PBB SHADOW E&M-EST. PATIENT-LVL III: ICD-10-PCS | Mod: PBBFAC,,, | Performed by: FAMILY MEDICINE

## 2020-01-15 PROCEDURE — 99999 PR PBB SHADOW E&M-EST. PATIENT-LVL III: CPT | Mod: PBBFAC,,, | Performed by: FAMILY MEDICINE

## 2020-01-15 PROCEDURE — 1101F PT FALLS ASSESS-DOCD LE1/YR: CPT | Mod: CPTII,S$GLB,, | Performed by: FAMILY MEDICINE

## 2020-01-15 PROCEDURE — 1101F PR PT FALLS ASSESS DOC 0-1 FALLS W/OUT INJ PAST YR: ICD-10-PCS | Mod: CPTII,S$GLB,, | Performed by: FAMILY MEDICINE

## 2020-01-15 RX ORDER — ATORVASTATIN CALCIUM 10 MG/1
TABLET, FILM COATED ORAL
Qty: 90 TABLET | Refills: 3 | Status: SHIPPED | OUTPATIENT
Start: 2020-01-15 | End: 2020-04-14

## 2020-01-15 RX ORDER — RISEDRONATE SODIUM 150 MG/1
TABLET, FILM COATED ORAL
Qty: 3 TABLET | Refills: 3 | Status: SHIPPED | OUTPATIENT
Start: 2020-01-15 | End: 2021-02-03

## 2020-01-15 RX ORDER — HYDROCHLOROTHIAZIDE 12.5 MG/1
CAPSULE ORAL
Qty: 90 CAPSULE | Refills: 3 | Status: SHIPPED | OUTPATIENT
Start: 2020-01-15 | End: 2021-02-04 | Stop reason: SDUPTHER
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
## 2020-01-15 NOTE — PATIENT INSTRUCTIONS
"       An UPPER RESPIRATORY ILLNESS is initially caused by a virus or allergies 95% of the time. The goal to help you feel better is drying up the drainage & stopping the cough so the virus can run it's course in about 10 days. If your drainage becomes more thick and worse after 7-10 days of trying the below  over the counter medications, please make an appointment for further evaluation    If you have post nasal drip , "runny nose" or sore throat from clearing your throat :  Take an ANTIHISTAMINE  (Claritin or Zyrtec or Allegra ) AT NIGHT    Nasal Saline: Tilt head back and squirt into nostril 2-3 times until you taste saline in back of throat. Spit, and blow nose. Do this 4 times, alternating right and left nostril.   Do this routine 2-3 times per day.     Nasacort Nasal spray (steroid spray over the counter) or Flonase (fluticasone prescription)  Twice daily to decrease swelling & post nasal drip ------ very safe , works where the congestion is , & does not interfere with other medication or go through your blood stream    If you still have drainage or ear popping/ pressure/ decreased hearing, and you do NOT have high blood pressure:  You can add a DECONGESTANT like Sudafed (if it doesn't cause palpitations) or Sudafed PE  In the MORNING.     If you have thick mucus drainage from the nose or coughing up thick phlegm:  You can add a MUCOLYTIC like Mucinex (plain)    If your cough persists:  You can add a COUGH SUPPRESSANT like Delsym (dextromethorphan) twice a day    If you have pain, sore throat, fever or chills:  You can alternate 250 mg of  Tylenol with 200mg of   ibuprofen every 3 hours - for the next 3 days  Discontinue and call me if  you have stomach upset    For SORE THROAT , try: Gargle with warm salt water 2-3 times per day.     Drink lots of WATER until your urine is clear because all of the above medications can "dry you out" and cause constipation.     Come & see me  if you are not better in 7-10 days " or if your symptoms worsen.

## 2020-03-10 RX ORDER — RISEDRONATE SODIUM 150 MG/1
TABLET, FILM COATED ORAL
Qty: 3 TABLET | Refills: 3 | OUTPATIENT
Start: 2020-03-10

## 2020-04-07 DIAGNOSIS — E78.5 HYPERLIPIDEMIA, UNSPECIFIED HYPERLIPIDEMIA TYPE: ICD-10-CM

## 2020-04-14 RX ORDER — ATORVASTATIN CALCIUM 10 MG/1
TABLET, FILM COATED ORAL
Qty: 90 TABLET | Refills: 0 | Status: SHIPPED | OUTPATIENT
Start: 2020-04-14 | End: 2021-02-04 | Stop reason: SDUPTHER

## 2020-05-15 DIAGNOSIS — Z12.39 BREAST CANCER SCREENING: ICD-10-CM

## 2020-10-09 ENCOUNTER — PATIENT MESSAGE (OUTPATIENT)
Dept: ADMINISTRATIVE | Facility: HOSPITAL | Age: 70
End: 2020-10-09

## 2021-01-04 ENCOUNTER — PATIENT MESSAGE (OUTPATIENT)
Dept: ADMINISTRATIVE | Facility: HOSPITAL | Age: 71
End: 2021-01-04

## 2021-01-05 ENCOUNTER — TELEPHONE (OUTPATIENT)
Dept: PRIMARY CARE CLINIC | Facility: CLINIC | Age: 71
End: 2021-01-05

## 2021-01-05 DIAGNOSIS — Z00.00 ROUTINE GENERAL MEDICAL EXAMINATION AT A HEALTH CARE FACILITY: Primary | ICD-10-CM

## 2021-01-16 ENCOUNTER — OFFICE VISIT (OUTPATIENT)
Dept: URGENT CARE | Facility: CLINIC | Age: 71
End: 2021-01-16
Payer: COMMERCIAL

## 2021-01-16 VITALS
RESPIRATION RATE: 18 BRPM | HEIGHT: 59 IN | TEMPERATURE: 99 F | HEART RATE: 94 BPM | BODY MASS INDEX: 23.99 KG/M2 | SYSTOLIC BLOOD PRESSURE: 133 MMHG | DIASTOLIC BLOOD PRESSURE: 85 MMHG | OXYGEN SATURATION: 97 % | WEIGHT: 119 LBS

## 2021-01-16 DIAGNOSIS — S91.311A LACERATION OF RIGHT FOOT, INITIAL ENCOUNTER: Primary | ICD-10-CM

## 2021-01-16 PROCEDURE — 12041 INTMD RPR N-HF/GENIT 2.5CM/<: CPT | Mod: S$GLB,,, | Performed by: FAMILY MEDICINE

## 2021-01-16 PROCEDURE — 73630 XR FOOT COMPLETE 3 VIEW RIGHT: ICD-10-PCS | Mod: RT,S$GLB,, | Performed by: RADIOLOGY

## 2021-01-16 PROCEDURE — 3008F PR BODY MASS INDEX (BMI) DOCUMENTED: ICD-10-PCS | Mod: CPTII,S$GLB,, | Performed by: FAMILY MEDICINE

## 2021-01-16 PROCEDURE — 99215 OFFICE O/P EST HI 40 MIN: CPT | Mod: 25,S$GLB,, | Performed by: FAMILY MEDICINE

## 2021-01-16 PROCEDURE — 12041 LACERATION REPAIR: ICD-10-PCS | Mod: S$GLB,,, | Performed by: FAMILY MEDICINE

## 2021-01-16 PROCEDURE — 99215 PR OFFICE/OUTPT VISIT, EST, LEVL V, 40-54 MIN: ICD-10-PCS | Mod: 25,S$GLB,, | Performed by: FAMILY MEDICINE

## 2021-01-16 PROCEDURE — 3008F BODY MASS INDEX DOCD: CPT | Mod: CPTII,S$GLB,, | Performed by: FAMILY MEDICINE

## 2021-01-16 PROCEDURE — 73630 X-RAY EXAM OF FOOT: CPT | Mod: RT,S$GLB,, | Performed by: RADIOLOGY

## 2021-01-16 RX ORDER — MUPIROCIN 20 MG/G
OINTMENT TOPICAL DAILY
Qty: 22 G | Refills: 0 | Status: SHIPPED | OUTPATIENT
Start: 2021-01-16 | End: 2021-01-23

## 2021-01-24 ENCOUNTER — CLINICAL SUPPORT (OUTPATIENT)
Dept: URGENT CARE | Facility: CLINIC | Age: 71
End: 2021-01-24
Payer: COMMERCIAL

## 2021-01-24 VITALS
RESPIRATION RATE: 16 BRPM | OXYGEN SATURATION: 96 % | WEIGHT: 118 LBS | BODY MASS INDEX: 23.16 KG/M2 | SYSTOLIC BLOOD PRESSURE: 121 MMHG | HEART RATE: 90 BPM | DIASTOLIC BLOOD PRESSURE: 79 MMHG | TEMPERATURE: 98 F | HEIGHT: 60 IN

## 2021-01-24 DIAGNOSIS — Z48.02 VISIT FOR SUTURE REMOVAL: Primary | ICD-10-CM

## 2021-01-24 PROCEDURE — 99212 PR OFFICE/OUTPT VISIT, EST, LEVL II, 10-19 MIN: ICD-10-PCS | Mod: 25,S$GLB,, | Performed by: NURSE PRACTITIONER

## 2021-01-24 PROCEDURE — 99212 OFFICE O/P EST SF 10 MIN: CPT | Mod: 25,S$GLB,, | Performed by: NURSE PRACTITIONER

## 2021-01-26 ENCOUNTER — IMMUNIZATION (OUTPATIENT)
Dept: PHARMACY | Facility: CLINIC | Age: 71
End: 2021-01-26
Payer: COMMERCIAL

## 2021-01-26 DIAGNOSIS — Z23 NEED FOR VACCINATION: Primary | ICD-10-CM

## 2021-02-01 ENCOUNTER — LAB VISIT (OUTPATIENT)
Dept: LAB | Facility: HOSPITAL | Age: 71
End: 2021-02-01
Attending: FAMILY MEDICINE
Payer: COMMERCIAL

## 2021-02-01 DIAGNOSIS — Z00.00 ROUTINE GENERAL MEDICAL EXAMINATION AT A HEALTH CARE FACILITY: ICD-10-CM

## 2021-02-01 LAB
ALBUMIN SERPL BCP-MCNC: 3.9 G/DL (ref 3.5–5.2)
ALP SERPL-CCNC: 65 U/L (ref 55–135)
ALT SERPL W/O P-5'-P-CCNC: 14 U/L (ref 10–44)
ANION GAP SERPL CALC-SCNC: 8 MMOL/L (ref 8–16)
AST SERPL-CCNC: 19 U/L (ref 10–40)
BASOPHILS # BLD AUTO: 0.02 K/UL (ref 0–0.2)
BASOPHILS NFR BLD: 0.4 % (ref 0–1.9)
BILIRUB SERPL-MCNC: 0.5 MG/DL (ref 0.1–1)
BUN SERPL-MCNC: 15 MG/DL (ref 8–23)
CALCIUM SERPL-MCNC: 8.7 MG/DL (ref 8.7–10.5)
CHLORIDE SERPL-SCNC: 105 MMOL/L (ref 95–110)
CHOLEST SERPL-MCNC: 156 MG/DL (ref 120–199)
CHOLEST/HDLC SERPL: 2.6 {RATIO} (ref 2–5)
CO2 SERPL-SCNC: 30 MMOL/L (ref 23–29)
CREAT SERPL-MCNC: 0.7 MG/DL (ref 0.5–1.4)
DIFFERENTIAL METHOD: ABNORMAL
EOSINOPHIL # BLD AUTO: 0.2 K/UL (ref 0–0.5)
EOSINOPHIL NFR BLD: 4 % (ref 0–8)
ERYTHROCYTE [DISTWIDTH] IN BLOOD BY AUTOMATED COUNT: 12.3 % (ref 11.5–14.5)
EST. GFR  (AFRICAN AMERICAN): >60 ML/MIN/1.73 M^2
EST. GFR  (NON AFRICAN AMERICAN): >60 ML/MIN/1.73 M^2
GLUCOSE SERPL-MCNC: 87 MG/DL (ref 70–110)
HCT VFR BLD AUTO: 44.9 % (ref 37–48.5)
HDLC SERPL-MCNC: 60 MG/DL (ref 40–75)
HDLC SERPL: 38.5 % (ref 20–50)
HGB BLD-MCNC: 14.8 G/DL (ref 12–16)
IMM GRANULOCYTES # BLD AUTO: 0.01 K/UL (ref 0–0.04)
IMM GRANULOCYTES NFR BLD AUTO: 0.2 % (ref 0–0.5)
LDLC SERPL CALC-MCNC: 66.6 MG/DL (ref 63–159)
LYMPHOCYTES # BLD AUTO: 2.1 K/UL (ref 1–4.8)
LYMPHOCYTES NFR BLD: 42.9 % (ref 18–48)
MCH RBC QN AUTO: 31.6 PG (ref 27–31)
MCHC RBC AUTO-ENTMCNC: 33 G/DL (ref 32–36)
MCV RBC AUTO: 96 FL (ref 82–98)
MONOCYTES # BLD AUTO: 0.4 K/UL (ref 0.3–1)
MONOCYTES NFR BLD: 8.1 % (ref 4–15)
NEUTROPHILS # BLD AUTO: 2.2 K/UL (ref 1.8–7.7)
NEUTROPHILS NFR BLD: 44.4 % (ref 38–73)
NONHDLC SERPL-MCNC: 96 MG/DL
NRBC BLD-RTO: 0 /100 WBC
PLATELET # BLD AUTO: 178 K/UL (ref 150–350)
PMV BLD AUTO: 11.6 FL (ref 9.2–12.9)
POTASSIUM SERPL-SCNC: 3.9 MMOL/L (ref 3.5–5.1)
PROT SERPL-MCNC: 6.4 G/DL (ref 6–8.4)
RBC # BLD AUTO: 4.68 M/UL (ref 4–5.4)
SODIUM SERPL-SCNC: 143 MMOL/L (ref 136–145)
TRIGL SERPL-MCNC: 147 MG/DL (ref 30–150)
WBC # BLD AUTO: 4.96 K/UL (ref 3.9–12.7)

## 2021-02-01 PROCEDURE — 80061 LIPID PANEL: CPT

## 2021-02-01 PROCEDURE — 80053 COMPREHEN METABOLIC PANEL: CPT

## 2021-02-01 PROCEDURE — 85025 COMPLETE CBC W/AUTO DIFF WBC: CPT

## 2021-02-01 PROCEDURE — 36415 COLL VENOUS BLD VENIPUNCTURE: CPT | Mod: PN

## 2021-02-04 ENCOUNTER — OFFICE VISIT (OUTPATIENT)
Dept: PRIMARY CARE CLINIC | Facility: CLINIC | Age: 71
End: 2021-02-04
Payer: COMMERCIAL

## 2021-02-04 VITALS
HEIGHT: 60 IN | BODY MASS INDEX: 23.55 KG/M2 | SYSTOLIC BLOOD PRESSURE: 128 MMHG | WEIGHT: 119.94 LBS | HEART RATE: 86 BPM | OXYGEN SATURATION: 98 % | DIASTOLIC BLOOD PRESSURE: 84 MMHG

## 2021-02-04 DIAGNOSIS — E78.2 MIXED HYPERLIPIDEMIA: ICD-10-CM

## 2021-02-04 DIAGNOSIS — M81.0 OSTEOPOROSIS, SENILE: ICD-10-CM

## 2021-02-04 DIAGNOSIS — H10.13 ALLERGIC CONJUNCTIVITIS, BILATERAL: ICD-10-CM

## 2021-02-04 DIAGNOSIS — I10 HTN (HYPERTENSION), BENIGN: ICD-10-CM

## 2021-02-04 DIAGNOSIS — Z00.00 ROUTINE GENERAL MEDICAL EXAMINATION AT A HEALTH CARE FACILITY: Primary | ICD-10-CM

## 2021-02-04 DIAGNOSIS — E78.5 HYPERLIPIDEMIA, UNSPECIFIED HYPERLIPIDEMIA TYPE: ICD-10-CM

## 2021-02-04 PROCEDURE — 3008F BODY MASS INDEX DOCD: CPT | Mod: CPTII,S$GLB,, | Performed by: FAMILY MEDICINE

## 2021-02-04 PROCEDURE — 1126F PR PAIN SEVERITY QUANTIFIED, NO PAIN PRESENT: ICD-10-PCS | Mod: S$GLB,,, | Performed by: FAMILY MEDICINE

## 2021-02-04 PROCEDURE — 99397 PER PM REEVAL EST PAT 65+ YR: CPT | Mod: S$GLB,,, | Performed by: FAMILY MEDICINE

## 2021-02-04 PROCEDURE — 99999 PR PBB SHADOW E&M-EST. PATIENT-LVL III: ICD-10-PCS | Mod: PBBFAC,,, | Performed by: FAMILY MEDICINE

## 2021-02-04 PROCEDURE — 99999 PR PBB SHADOW E&M-EST. PATIENT-LVL III: CPT | Mod: PBBFAC,,, | Performed by: FAMILY MEDICINE

## 2021-02-04 PROCEDURE — 1126F AMNT PAIN NOTED NONE PRSNT: CPT | Mod: S$GLB,,, | Performed by: FAMILY MEDICINE

## 2021-02-04 PROCEDURE — 3074F PR MOST RECENT SYSTOLIC BLOOD PRESSURE < 130 MM HG: ICD-10-PCS | Mod: CPTII,S$GLB,, | Performed by: FAMILY MEDICINE

## 2021-02-04 PROCEDURE — 3074F SYST BP LT 130 MM HG: CPT | Mod: CPTII,S$GLB,, | Performed by: FAMILY MEDICINE

## 2021-02-04 PROCEDURE — 3079F PR MOST RECENT DIASTOLIC BLOOD PRESSURE 80-89 MM HG: ICD-10-PCS | Mod: CPTII,S$GLB,, | Performed by: FAMILY MEDICINE

## 2021-02-04 PROCEDURE — 3008F PR BODY MASS INDEX (BMI) DOCUMENTED: ICD-10-PCS | Mod: CPTII,S$GLB,, | Performed by: FAMILY MEDICINE

## 2021-02-04 PROCEDURE — 3079F DIAST BP 80-89 MM HG: CPT | Mod: CPTII,S$GLB,, | Performed by: FAMILY MEDICINE

## 2021-02-04 PROCEDURE — 99397 PR PREVENTIVE VISIT,EST,65 & OVER: ICD-10-PCS | Mod: S$GLB,,, | Performed by: FAMILY MEDICINE

## 2021-02-04 RX ORDER — MONTELUKAST SODIUM 10 MG/1
10 TABLET ORAL DAILY
Qty: 30 TABLET | Refills: 12 | Status: SHIPPED | OUTPATIENT
Start: 2021-02-04 | End: 2021-03-06

## 2021-02-04 RX ORDER — ATORVASTATIN CALCIUM 10 MG/1
10 TABLET, FILM COATED ORAL DAILY
Qty: 90 TABLET | Refills: 3 | Status: SHIPPED | OUTPATIENT
Start: 2021-02-04 | End: 2022-02-08 | Stop reason: SDUPTHER

## 2021-02-04 RX ORDER — HYDROCHLOROTHIAZIDE 12.5 MG/1
CAPSULE ORAL
Qty: 90 CAPSULE | Refills: 3 | Status: SHIPPED | OUTPATIENT
Start: 2021-02-04 | End: 2022-01-28

## 2021-02-04 RX ORDER — RISEDRONATE SODIUM 150 MG/1
TABLET, FILM COATED ORAL
Qty: 3 TABLET | Refills: 3 | Status: SHIPPED | OUTPATIENT
Start: 2021-02-04 | End: 2022-02-08 | Stop reason: SDUPTHER

## 2021-02-04 RX ORDER — OLOPATADINE HYDROCHLORIDE 2 MG/ML
1 SOLUTION/ DROPS OPHTHALMIC DAILY
Qty: 5 ML | Refills: 12 | Status: SHIPPED | OUTPATIENT
Start: 2021-02-04 | End: 2022-02-17

## 2021-02-23 ENCOUNTER — IMMUNIZATION (OUTPATIENT)
Dept: PHARMACY | Facility: CLINIC | Age: 71
End: 2021-02-23
Payer: COMMERCIAL

## 2021-02-23 DIAGNOSIS — Z23 NEED FOR VACCINATION: Primary | ICD-10-CM

## 2021-03-26 ENCOUNTER — PATIENT MESSAGE (OUTPATIENT)
Dept: RESEARCH | Facility: HOSPITAL | Age: 71
End: 2021-03-26

## 2021-04-05 ENCOUNTER — PATIENT MESSAGE (OUTPATIENT)
Dept: ADMINISTRATIVE | Facility: HOSPITAL | Age: 71
End: 2021-04-05

## 2021-06-28 ENCOUNTER — PATIENT OUTREACH (OUTPATIENT)
Dept: ADMINISTRATIVE | Facility: OTHER | Age: 71
End: 2021-06-28

## 2021-06-28 DIAGNOSIS — Z12.31 BREAST CANCER SCREENING BY MAMMOGRAM: Primary | ICD-10-CM

## 2021-06-29 ENCOUNTER — OFFICE VISIT (OUTPATIENT)
Dept: OPHTHALMOLOGY | Facility: CLINIC | Age: 71
End: 2021-06-29
Attending: OPHTHALMOLOGY
Payer: COMMERCIAL

## 2021-06-29 DIAGNOSIS — Z98.890 HISTORY OF RADIAL KERATOTOMY: Primary | ICD-10-CM

## 2021-06-29 DIAGNOSIS — H25.13 NUCLEAR SCLEROSIS, BILATERAL: ICD-10-CM

## 2021-06-29 PROCEDURE — 99999 PR PBB SHADOW E&M-EST. PATIENT-LVL III: CPT | Mod: PBBFAC,,, | Performed by: OPHTHALMOLOGY

## 2021-06-29 PROCEDURE — 1159F PR MEDICATION LIST DOCUMENTED IN MEDICAL RECORD: ICD-10-PCS | Mod: S$GLB,,, | Performed by: OPHTHALMOLOGY

## 2021-06-29 PROCEDURE — 1126F PR PAIN SEVERITY QUANTIFIED, NO PAIN PRESENT: ICD-10-PCS | Mod: S$GLB,,, | Performed by: OPHTHALMOLOGY

## 2021-06-29 PROCEDURE — 99999 PR PBB SHADOW E&M-EST. PATIENT-LVL III: ICD-10-PCS | Mod: PBBFAC,,, | Performed by: OPHTHALMOLOGY

## 2021-06-29 PROCEDURE — 1126F AMNT PAIN NOTED NONE PRSNT: CPT | Mod: S$GLB,,, | Performed by: OPHTHALMOLOGY

## 2021-06-29 PROCEDURE — 99214 OFFICE O/P EST MOD 30 MIN: CPT | Mod: S$GLB,,, | Performed by: OPHTHALMOLOGY

## 2021-06-29 PROCEDURE — 99214 PR OFFICE/OUTPT VISIT, EST, LEVL IV, 30-39 MIN: ICD-10-PCS | Mod: S$GLB,,, | Performed by: OPHTHALMOLOGY

## 2021-06-29 PROCEDURE — 1159F MED LIST DOCD IN RCRD: CPT | Mod: S$GLB,,, | Performed by: OPHTHALMOLOGY

## 2021-06-29 RX ORDER — TROPICAMIDE 10 MG/ML
1 SOLUTION/ DROPS OPHTHALMIC
Status: CANCELLED | OUTPATIENT
Start: 2021-06-29

## 2021-06-29 RX ORDER — SODIUM CHLORIDE 0.9 % (FLUSH) 0.9 %
10 SYRINGE (ML) INJECTION
Status: DISCONTINUED | OUTPATIENT
Start: 2021-06-29 | End: 2021-10-07 | Stop reason: ALTCHOICE

## 2021-06-29 RX ORDER — PHENYLEPHRINE HYDROCHLORIDE 25 MG/ML
1 SOLUTION/ DROPS OPHTHALMIC
Status: CANCELLED | OUTPATIENT
Start: 2021-06-29

## 2021-06-29 RX ORDER — PREDNISOLONE ACETATE-GATIFLOXACIN-BROMFENAC .75; 5; 1 MG/ML; MG/ML; MG/ML
1 SUSPENSION/ DROPS OPHTHALMIC 3 TIMES DAILY
Qty: 5 ML | Refills: 3 | Status: SHIPPED | OUTPATIENT
Start: 2021-06-29 | End: 2021-12-02 | Stop reason: ALTCHOICE

## 2021-06-29 RX ORDER — TETRACAINE HYDROCHLORIDE 5 MG/ML
1 SOLUTION OPHTHALMIC
Status: CANCELLED | OUTPATIENT
Start: 2021-06-29

## 2021-06-29 RX ORDER — MOXIFLOXACIN 5 MG/ML
1 SOLUTION/ DROPS OPHTHALMIC
Status: CANCELLED | OUTPATIENT
Start: 2021-06-29

## 2021-07-07 ENCOUNTER — PATIENT MESSAGE (OUTPATIENT)
Dept: ADMINISTRATIVE | Facility: HOSPITAL | Age: 71
End: 2021-07-07

## 2021-07-13 ENCOUNTER — TELEPHONE (OUTPATIENT)
Dept: OPHTHALMOLOGY | Facility: CLINIC | Age: 71
End: 2021-07-13

## 2021-07-13 DIAGNOSIS — H25.12 NUCLEAR SCLEROTIC CATARACT OF LEFT EYE: Primary | ICD-10-CM

## 2021-08-02 ENCOUNTER — TELEPHONE (OUTPATIENT)
Dept: OPHTHALMOLOGY | Facility: CLINIC | Age: 71
End: 2021-08-02

## 2021-08-03 ENCOUNTER — LAB VISIT (OUTPATIENT)
Dept: SPORTS MEDICINE | Facility: CLINIC | Age: 71
End: 2021-08-03
Payer: COMMERCIAL

## 2021-08-03 DIAGNOSIS — Z01.818 PRE-OP TESTING: ICD-10-CM

## 2021-08-03 PROCEDURE — U0005 INFEC AGEN DETEC AMPLI PROBE: HCPCS | Performed by: OPHTHALMOLOGY

## 2021-08-03 PROCEDURE — U0003 INFECTIOUS AGENT DETECTION BY NUCLEIC ACID (DNA OR RNA); SEVERE ACUTE RESPIRATORY SYNDROME CORONAVIRUS 2 (SARS-COV-2) (CORONAVIRUS DISEASE [COVID-19]), AMPLIFIED PROBE TECHNIQUE, MAKING USE OF HIGH THROUGHPUT TECHNOLOGIES AS DESCRIBED BY CMS-2020-01-R: HCPCS | Performed by: OPHTHALMOLOGY

## 2021-08-04 LAB
SARS-COV-2 RNA RESP QL NAA+PROBE: NOT DETECTED
SARS-COV-2- CYCLE NUMBER: -1

## 2021-08-05 ENCOUNTER — ANESTHESIA (OUTPATIENT)
Dept: SURGERY | Facility: OTHER | Age: 71
End: 2021-08-05
Payer: COMMERCIAL

## 2021-08-05 ENCOUNTER — HOSPITAL ENCOUNTER (OUTPATIENT)
Facility: OTHER | Age: 71
Discharge: HOME | End: 2021-08-05
Attending: OPHTHALMOLOGY | Admitting: OPHTHALMOLOGY
Payer: COMMERCIAL

## 2021-08-05 ENCOUNTER — ANESTHESIA EVENT (OUTPATIENT)
Dept: SURGERY | Facility: OTHER | Age: 71
End: 2021-08-05
Payer: COMMERCIAL

## 2021-08-05 VITALS
TEMPERATURE: 98 F | DIASTOLIC BLOOD PRESSURE: 81 MMHG | WEIGHT: 118 LBS | OXYGEN SATURATION: 95 % | BODY MASS INDEX: 23.16 KG/M2 | RESPIRATION RATE: 16 BRPM | HEIGHT: 60 IN | SYSTOLIC BLOOD PRESSURE: 133 MMHG | HEART RATE: 74 BPM

## 2021-08-05 DIAGNOSIS — Z98.890 HISTORY OF RADIAL KERATOTOMY: ICD-10-CM

## 2021-08-05 DIAGNOSIS — H25.12 NUCLEAR SCLEROTIC CATARACT OF LEFT EYE: Primary | ICD-10-CM

## 2021-08-05 DIAGNOSIS — H25.13 NUCLEAR SCLEROSIS, BILATERAL: ICD-10-CM

## 2021-08-05 DIAGNOSIS — Z01.818 PRE-OP TESTING: ICD-10-CM

## 2021-08-05 PROCEDURE — 71000015 HC POSTOP RECOV 1ST HR: Performed by: OPHTHALMOLOGY

## 2021-08-05 PROCEDURE — V2632 POST CHMBR INTRAOCULAR LENS: HCPCS | Performed by: OPHTHALMOLOGY

## 2021-08-05 PROCEDURE — 37000008 HC ANESTHESIA 1ST 15 MINUTES: Performed by: OPHTHALMOLOGY

## 2021-08-05 PROCEDURE — 25000003 PHARM REV CODE 250: Performed by: OPHTHALMOLOGY

## 2021-08-05 PROCEDURE — 36000707: Performed by: OPHTHALMOLOGY

## 2021-08-05 PROCEDURE — 37000009 HC ANESTHESIA EA ADD 15 MINS: Performed by: OPHTHALMOLOGY

## 2021-08-05 PROCEDURE — 66984 XCAPSL CTRC RMVL W/O ECP: CPT | Mod: LT,,, | Performed by: OPHTHALMOLOGY

## 2021-08-05 PROCEDURE — 66984 PR REMOVAL, CATARACT, W/INSRT INTRAOC LENS, W/O ENDO CYCLO: ICD-10-PCS | Mod: LT,,, | Performed by: OPHTHALMOLOGY

## 2021-08-05 PROCEDURE — 36000706: Performed by: OPHTHALMOLOGY

## 2021-08-05 PROCEDURE — 63600175 PHARM REV CODE 636 W HCPCS: Performed by: NURSE ANESTHETIST, CERTIFIED REGISTERED

## 2021-08-05 PROCEDURE — 66999 PR FEMTO LRI: ICD-10-PCS | Mod: CSM,LT,, | Performed by: OPHTHALMOLOGY

## 2021-08-05 PROCEDURE — 66999 UNLISTED PX ANT SEGMENT EYE: CPT | Mod: CSM,LT,, | Performed by: OPHTHALMOLOGY

## 2021-08-05 DEVICE — LENS EYHANCE +22.0D: Type: IMPLANTABLE DEVICE | Site: EYE | Status: FUNCTIONAL

## 2021-08-05 RX ORDER — TROPICAMIDE 10 MG/ML
1 SOLUTION/ DROPS OPHTHALMIC
Status: COMPLETED | OUTPATIENT
Start: 2021-08-05 | End: 2021-08-05

## 2021-08-05 RX ORDER — MOXIFLOXACIN 5 MG/ML
1 SOLUTION/ DROPS OPHTHALMIC
Status: COMPLETED | OUTPATIENT
Start: 2021-08-05 | End: 2021-08-05

## 2021-08-05 RX ORDER — ACETAMINOPHEN 325 MG/1
650 TABLET ORAL EVERY 4 HOURS PRN
Status: DISCONTINUED | OUTPATIENT
Start: 2021-08-05 | End: 2021-08-05 | Stop reason: HOSPADM

## 2021-08-05 RX ORDER — PROPARACAINE HYDROCHLORIDE 5 MG/ML
1 SOLUTION/ DROPS OPHTHALMIC
Status: DISCONTINUED | OUTPATIENT
Start: 2021-08-05 | End: 2021-08-05 | Stop reason: HOSPADM

## 2021-08-05 RX ORDER — TETRACAINE HYDROCHLORIDE 5 MG/ML
1 SOLUTION OPHTHALMIC
Status: COMPLETED | OUTPATIENT
Start: 2021-08-05 | End: 2021-08-05

## 2021-08-05 RX ORDER — TETRACAINE HYDROCHLORIDE 5 MG/ML
SOLUTION OPHTHALMIC
Status: DISCONTINUED | OUTPATIENT
Start: 2021-08-05 | End: 2021-08-05 | Stop reason: HOSPADM

## 2021-08-05 RX ORDER — MOXIFLOXACIN 5 MG/ML
SOLUTION/ DROPS OPHTHALMIC
Status: DISCONTINUED | OUTPATIENT
Start: 2021-08-05 | End: 2021-08-05 | Stop reason: HOSPADM

## 2021-08-05 RX ORDER — LIDOCAINE HYDROCHLORIDE 40 MG/ML
INJECTION, SOLUTION RETROBULBAR
Status: DISCONTINUED | OUTPATIENT
Start: 2021-08-05 | End: 2021-08-05 | Stop reason: HOSPADM

## 2021-08-05 RX ORDER — PHENYLEPHRINE HYDROCHLORIDE 25 MG/ML
1 SOLUTION/ DROPS OPHTHALMIC
Status: COMPLETED | OUTPATIENT
Start: 2021-08-05 | End: 2021-08-05

## 2021-08-05 RX ORDER — MIDAZOLAM HYDROCHLORIDE 1 MG/ML
INJECTION INTRAMUSCULAR; INTRAVENOUS
Status: DISCONTINUED | OUTPATIENT
Start: 2021-08-05 | End: 2021-08-05

## 2021-08-05 RX ADMIN — TETRACAINE HYDROCHLORIDE 1 DROP: 5 SOLUTION OPHTHALMIC at 09:08

## 2021-08-05 RX ADMIN — TROPICAMIDE 1 DROP: 10 SOLUTION/ DROPS OPHTHALMIC at 09:08

## 2021-08-05 RX ADMIN — PHENYLEPHRINE HYDROCHLORIDE 1 DROP: 25 SOLUTION/ DROPS OPHTHALMIC at 09:08

## 2021-08-05 RX ADMIN — MOXIFLOXACIN 1 DROP: 5 SOLUTION/ DROPS OPHTHALMIC at 11:08

## 2021-08-05 RX ADMIN — MOXIFLOXACIN 1 DROP: 5 SOLUTION/ DROPS OPHTHALMIC at 09:08

## 2021-08-05 RX ADMIN — MIDAZOLAM HYDROCHLORIDE 2 MG: 1 INJECTION, SOLUTION INTRAMUSCULAR; INTRAVENOUS at 10:08

## 2021-08-05 RX ADMIN — MIDAZOLAM HYDROCHLORIDE 1 MG: 1 INJECTION, SOLUTION INTRAMUSCULAR; INTRAVENOUS at 10:08

## 2021-08-06 ENCOUNTER — OFFICE VISIT (OUTPATIENT)
Dept: OPHTHALMOLOGY | Facility: CLINIC | Age: 71
End: 2021-08-06
Attending: OPHTHALMOLOGY
Payer: COMMERCIAL

## 2021-08-06 DIAGNOSIS — Z98.890 POST-OPERATIVE STATE: Primary | ICD-10-CM

## 2021-08-06 DIAGNOSIS — H25.12 NUCLEAR SCLEROSIS OF LEFT EYE: ICD-10-CM

## 2021-08-06 PROCEDURE — 1159F MED LIST DOCD IN RCRD: CPT | Mod: CPTII,S$GLB,, | Performed by: OPHTHALMOLOGY

## 2021-08-06 PROCEDURE — 99999 PR PBB SHADOW E&M-EST. PATIENT-LVL II: CPT | Mod: PBBFAC,,, | Performed by: OPHTHALMOLOGY

## 2021-08-06 PROCEDURE — 99024 PR POST-OP FOLLOW-UP VISIT: ICD-10-PCS | Mod: S$GLB,,, | Performed by: OPHTHALMOLOGY

## 2021-08-06 PROCEDURE — 1160F RVW MEDS BY RX/DR IN RCRD: CPT | Mod: CPTII,S$GLB,, | Performed by: OPHTHALMOLOGY

## 2021-08-06 PROCEDURE — 1126F AMNT PAIN NOTED NONE PRSNT: CPT | Mod: CPTII,S$GLB,, | Performed by: OPHTHALMOLOGY

## 2021-08-06 PROCEDURE — 99024 POSTOP FOLLOW-UP VISIT: CPT | Mod: S$GLB,,, | Performed by: OPHTHALMOLOGY

## 2021-08-06 PROCEDURE — 1126F PR PAIN SEVERITY QUANTIFIED, NO PAIN PRESENT: ICD-10-PCS | Mod: CPTII,S$GLB,, | Performed by: OPHTHALMOLOGY

## 2021-08-06 PROCEDURE — 1160F PR REVIEW ALL MEDS BY PRESCRIBER/CLIN PHARMACIST DOCUMENTED: ICD-10-PCS | Mod: CPTII,S$GLB,, | Performed by: OPHTHALMOLOGY

## 2021-08-06 PROCEDURE — 99999 PR PBB SHADOW E&M-EST. PATIENT-LVL II: ICD-10-PCS | Mod: PBBFAC,,, | Performed by: OPHTHALMOLOGY

## 2021-08-06 PROCEDURE — 1159F PR MEDICATION LIST DOCUMENTED IN MEDICAL RECORD: ICD-10-PCS | Mod: CPTII,S$GLB,, | Performed by: OPHTHALMOLOGY

## 2021-08-10 ENCOUNTER — OFFICE VISIT (OUTPATIENT)
Dept: OPHTHALMOLOGY | Facility: CLINIC | Age: 71
End: 2021-08-10
Attending: OPHTHALMOLOGY
Payer: COMMERCIAL

## 2021-08-10 DIAGNOSIS — H25.13 NUCLEAR SCLEROSIS, BILATERAL: Primary | ICD-10-CM

## 2021-08-10 DIAGNOSIS — Z98.890 POST-OPERATIVE STATE: ICD-10-CM

## 2021-08-10 PROCEDURE — 1126F AMNT PAIN NOTED NONE PRSNT: CPT | Mod: CPTII,S$GLB,, | Performed by: OPHTHALMOLOGY

## 2021-08-10 PROCEDURE — 1160F PR REVIEW ALL MEDS BY PRESCRIBER/CLIN PHARMACIST DOCUMENTED: ICD-10-PCS | Mod: CPTII,S$GLB,, | Performed by: OPHTHALMOLOGY

## 2021-08-10 PROCEDURE — 99024 PR POST-OP FOLLOW-UP VISIT: ICD-10-PCS | Mod: S$GLB,,, | Performed by: OPHTHALMOLOGY

## 2021-08-10 PROCEDURE — 99999 PR PBB SHADOW E&M-EST. PATIENT-LVL II: CPT | Mod: PBBFAC,,, | Performed by: OPHTHALMOLOGY

## 2021-08-10 PROCEDURE — 99999 PR PBB SHADOW E&M-EST. PATIENT-LVL II: ICD-10-PCS | Mod: PBBFAC,,, | Performed by: OPHTHALMOLOGY

## 2021-08-10 PROCEDURE — 1159F MED LIST DOCD IN RCRD: CPT | Mod: CPTII,S$GLB,, | Performed by: OPHTHALMOLOGY

## 2021-08-10 PROCEDURE — 1126F PR PAIN SEVERITY QUANTIFIED, NO PAIN PRESENT: ICD-10-PCS | Mod: CPTII,S$GLB,, | Performed by: OPHTHALMOLOGY

## 2021-08-10 PROCEDURE — 1160F RVW MEDS BY RX/DR IN RCRD: CPT | Mod: CPTII,S$GLB,, | Performed by: OPHTHALMOLOGY

## 2021-08-10 PROCEDURE — 1159F PR MEDICATION LIST DOCUMENTED IN MEDICAL RECORD: ICD-10-PCS | Mod: CPTII,S$GLB,, | Performed by: OPHTHALMOLOGY

## 2021-08-10 PROCEDURE — 99024 POSTOP FOLLOW-UP VISIT: CPT | Mod: S$GLB,,, | Performed by: OPHTHALMOLOGY

## 2021-08-10 RX ORDER — PHENYLEPHRINE HYDROCHLORIDE 25 MG/ML
1 SOLUTION/ DROPS OPHTHALMIC
Status: CANCELLED | OUTPATIENT
Start: 2021-08-10

## 2021-08-10 RX ORDER — SODIUM CHLORIDE 0.9 % (FLUSH) 0.9 %
10 SYRINGE (ML) INJECTION
Status: DISCONTINUED | OUTPATIENT
Start: 2021-08-10 | End: 2021-10-07 | Stop reason: ALTCHOICE

## 2021-08-10 RX ORDER — TROPICAMIDE 10 MG/ML
1 SOLUTION/ DROPS OPHTHALMIC
Status: CANCELLED | OUTPATIENT
Start: 2021-08-10

## 2021-08-10 RX ORDER — MOXIFLOXACIN 5 MG/ML
1 SOLUTION/ DROPS OPHTHALMIC
Status: CANCELLED | OUTPATIENT
Start: 2021-08-10

## 2021-08-10 RX ORDER — TETRACAINE HYDROCHLORIDE 5 MG/ML
1 SOLUTION OPHTHALMIC
Status: CANCELLED | OUTPATIENT
Start: 2021-08-10

## 2021-09-21 ENCOUNTER — TELEPHONE (OUTPATIENT)
Dept: OPHTHALMOLOGY | Facility: CLINIC | Age: 71
End: 2021-09-21

## 2021-09-21 DIAGNOSIS — H25.11 NUCLEAR SCLEROTIC CATARACT OF RIGHT EYE: Primary | ICD-10-CM

## 2021-09-30 ENCOUNTER — TELEPHONE (OUTPATIENT)
Dept: OPHTHALMOLOGY | Facility: CLINIC | Age: 71
End: 2021-09-30

## 2021-09-30 DIAGNOSIS — Z01.818 PRE-OP TESTING: Primary | ICD-10-CM

## 2021-10-04 ENCOUNTER — TELEPHONE (OUTPATIENT)
Dept: OPHTHALMOLOGY | Facility: CLINIC | Age: 71
End: 2021-10-04

## 2021-10-05 ENCOUNTER — TELEPHONE (OUTPATIENT)
Dept: OPHTHALMOLOGY | Facility: CLINIC | Age: 71
End: 2021-10-05

## 2021-10-05 ENCOUNTER — LAB VISIT (OUTPATIENT)
Dept: SPORTS MEDICINE | Facility: CLINIC | Age: 71
End: 2021-10-05
Payer: COMMERCIAL

## 2021-10-05 DIAGNOSIS — Z01.818 PRE-OP TESTING: ICD-10-CM

## 2021-10-05 PROCEDURE — U0005 INFEC AGEN DETEC AMPLI PROBE: HCPCS | Performed by: OPHTHALMOLOGY

## 2021-10-05 PROCEDURE — U0003 INFECTIOUS AGENT DETECTION BY NUCLEIC ACID (DNA OR RNA); SEVERE ACUTE RESPIRATORY SYNDROME CORONAVIRUS 2 (SARS-COV-2) (CORONAVIRUS DISEASE [COVID-19]), AMPLIFIED PROBE TECHNIQUE, MAKING USE OF HIGH THROUGHPUT TECHNOLOGIES AS DESCRIBED BY CMS-2020-01-R: HCPCS | Performed by: OPHTHALMOLOGY

## 2021-10-05 RX ORDER — MONTELUKAST SODIUM 10 MG/1
10 TABLET ORAL DAILY
COMMUNITY
Start: 2021-08-05 | End: 2022-01-28

## 2021-10-06 LAB
SARS-COV-2 RNA RESP QL NAA+PROBE: NOT DETECTED
SARS-COV-2- CYCLE NUMBER: NORMAL

## 2021-10-07 ENCOUNTER — ANESTHESIA EVENT (OUTPATIENT)
Dept: SURGERY | Facility: OTHER | Age: 71
End: 2021-10-07
Payer: COMMERCIAL

## 2021-10-07 ENCOUNTER — ANESTHESIA (OUTPATIENT)
Dept: SURGERY | Facility: OTHER | Age: 71
End: 2021-10-07
Payer: COMMERCIAL

## 2021-10-07 ENCOUNTER — HOSPITAL ENCOUNTER (OUTPATIENT)
Facility: OTHER | Age: 71
Discharge: HOME | End: 2021-10-07
Attending: OPHTHALMOLOGY | Admitting: OPHTHALMOLOGY
Payer: COMMERCIAL

## 2021-10-07 VITALS
SYSTOLIC BLOOD PRESSURE: 153 MMHG | HEART RATE: 75 BPM | TEMPERATURE: 99 F | HEIGHT: 60 IN | OXYGEN SATURATION: 95 % | DIASTOLIC BLOOD PRESSURE: 77 MMHG | BODY MASS INDEX: 22.97 KG/M2 | WEIGHT: 117 LBS | RESPIRATION RATE: 18 BRPM

## 2021-10-07 DIAGNOSIS — H25.11 NUCLEAR SCLEROTIC CATARACT OF RIGHT EYE: Primary | ICD-10-CM

## 2021-10-07 PROCEDURE — 63600175 PHARM REV CODE 636 W HCPCS: Performed by: NURSE ANESTHETIST, CERTIFIED REGISTERED

## 2021-10-07 PROCEDURE — 36000706: Performed by: OPHTHALMOLOGY

## 2021-10-07 PROCEDURE — 37000008 HC ANESTHESIA 1ST 15 MINUTES: Performed by: OPHTHALMOLOGY

## 2021-10-07 PROCEDURE — 71000015 HC POSTOP RECOV 1ST HR: Performed by: OPHTHALMOLOGY

## 2021-10-07 PROCEDURE — V2632 POST CHMBR INTRAOCULAR LENS: HCPCS | Mod: WS | Performed by: OPHTHALMOLOGY

## 2021-10-07 PROCEDURE — 66999 UNLISTED PX ANT SEGMENT EYE: CPT | Mod: CSM,RT,, | Performed by: OPHTHALMOLOGY

## 2021-10-07 PROCEDURE — 25000003 PHARM REV CODE 250: Performed by: OPHTHALMOLOGY

## 2021-10-07 PROCEDURE — 66984 XCAPSL CTRC RMVL W/O ECP: CPT | Mod: 79,RT,, | Performed by: OPHTHALMOLOGY

## 2021-10-07 PROCEDURE — 66984 PR REMOVAL, CATARACT, W/INSRT INTRAOC LENS, W/O ENDO CYCLO: ICD-10-PCS | Mod: 79,RT,, | Performed by: OPHTHALMOLOGY

## 2021-10-07 PROCEDURE — 66999 PR FEMTO LRI: ICD-10-PCS | Mod: CSM,RT,, | Performed by: OPHTHALMOLOGY

## 2021-10-07 PROCEDURE — 37000009 HC ANESTHESIA EA ADD 15 MINS: Performed by: OPHTHALMOLOGY

## 2021-10-07 PROCEDURE — 36000707: Performed by: OPHTHALMOLOGY

## 2021-10-07 DEVICE — LENS EYHANCE +19.5D: Type: IMPLANTABLE DEVICE | Site: EYE | Status: FUNCTIONAL

## 2021-10-07 RX ORDER — TETRACAINE HYDROCHLORIDE 5 MG/ML
1 SOLUTION OPHTHALMIC
Status: COMPLETED | OUTPATIENT
Start: 2021-10-07 | End: 2021-10-07

## 2021-10-07 RX ORDER — MOXIFLOXACIN 5 MG/ML
1 SOLUTION/ DROPS OPHTHALMIC
Status: COMPLETED | OUTPATIENT
Start: 2021-10-07 | End: 2021-10-07

## 2021-10-07 RX ORDER — LIDOCAINE HYDROCHLORIDE 40 MG/ML
INJECTION, SOLUTION RETROBULBAR
Status: DISCONTINUED | OUTPATIENT
Start: 2021-10-07 | End: 2021-10-07 | Stop reason: HOSPADM

## 2021-10-07 RX ORDER — MOXIFLOXACIN 5 MG/ML
SOLUTION/ DROPS OPHTHALMIC
Status: DISCONTINUED | OUTPATIENT
Start: 2021-10-07 | End: 2021-10-07 | Stop reason: HOSPADM

## 2021-10-07 RX ORDER — PROPARACAINE HYDROCHLORIDE 5 MG/ML
1 SOLUTION/ DROPS OPHTHALMIC
Status: DISCONTINUED | OUTPATIENT
Start: 2021-10-07 | End: 2021-10-07 | Stop reason: HOSPADM

## 2021-10-07 RX ORDER — PHENYLEPHRINE HYDROCHLORIDE 25 MG/ML
1 SOLUTION/ DROPS OPHTHALMIC
Status: COMPLETED | OUTPATIENT
Start: 2021-10-07 | End: 2021-10-07

## 2021-10-07 RX ORDER — TETRACAINE HYDROCHLORIDE 5 MG/ML
SOLUTION OPHTHALMIC
Status: DISCONTINUED | OUTPATIENT
Start: 2021-10-07 | End: 2021-10-07 | Stop reason: HOSPADM

## 2021-10-07 RX ORDER — ACETAMINOPHEN 325 MG/1
650 TABLET ORAL EVERY 4 HOURS PRN
Status: DISCONTINUED | OUTPATIENT
Start: 2021-10-07 | End: 2021-10-07 | Stop reason: HOSPADM

## 2021-10-07 RX ORDER — SODIUM CHLORIDE 0.9 % (FLUSH) 0.9 %
10 SYRINGE (ML) INJECTION
Status: DISCONTINUED | OUTPATIENT
Start: 2021-10-07 | End: 2021-10-07 | Stop reason: HOSPADM

## 2021-10-07 RX ORDER — MIDAZOLAM HYDROCHLORIDE 1 MG/ML
INJECTION INTRAMUSCULAR; INTRAVENOUS
Status: DISCONTINUED | OUTPATIENT
Start: 2021-10-07 | End: 2021-10-07

## 2021-10-07 RX ORDER — TROPICAMIDE 10 MG/ML
1 SOLUTION/ DROPS OPHTHALMIC
Status: COMPLETED | OUTPATIENT
Start: 2021-10-07 | End: 2021-10-07

## 2021-10-07 RX ADMIN — TETRACAINE HYDROCHLORIDE 1 DROP: 5 SOLUTION OPHTHALMIC at 08:10

## 2021-10-07 RX ADMIN — TROPICAMIDE 1 DROP: 10 SOLUTION/ DROPS OPHTHALMIC at 08:10

## 2021-10-07 RX ADMIN — MOXIFLOXACIN 1 DROP: 5 SOLUTION/ DROPS OPHTHALMIC at 08:10

## 2021-10-07 RX ADMIN — MOXIFLOXACIN 1 DROP: 5 SOLUTION/ DROPS OPHTHALMIC at 10:10

## 2021-10-07 RX ADMIN — PHENYLEPHRINE HYDROCHLORIDE 1 DROP: 25 SOLUTION/ DROPS OPHTHALMIC at 08:10

## 2021-10-07 RX ADMIN — MIDAZOLAM HYDROCHLORIDE 2 MG: 1 INJECTION, SOLUTION INTRAMUSCULAR; INTRAVENOUS at 10:10

## 2021-10-08 ENCOUNTER — OFFICE VISIT (OUTPATIENT)
Dept: OPHTHALMOLOGY | Facility: CLINIC | Age: 71
End: 2021-10-08
Attending: OPHTHALMOLOGY
Payer: COMMERCIAL

## 2021-10-08 DIAGNOSIS — Z98.890 POST-OPERATIVE STATE: Primary | ICD-10-CM

## 2021-10-08 DIAGNOSIS — H25.11 NUCLEAR SCLEROSIS OF RIGHT EYE: ICD-10-CM

## 2021-10-08 PROCEDURE — 1160F RVW MEDS BY RX/DR IN RCRD: CPT | Mod: CPTII,S$GLB,, | Performed by: OPHTHALMOLOGY

## 2021-10-08 PROCEDURE — 1126F PR PAIN SEVERITY QUANTIFIED, NO PAIN PRESENT: ICD-10-PCS | Mod: CPTII,S$GLB,, | Performed by: OPHTHALMOLOGY

## 2021-10-08 PROCEDURE — 1160F PR REVIEW ALL MEDS BY PRESCRIBER/CLIN PHARMACIST DOCUMENTED: ICD-10-PCS | Mod: CPTII,S$GLB,, | Performed by: OPHTHALMOLOGY

## 2021-10-08 PROCEDURE — 99999 PR PBB SHADOW E&M-EST. PATIENT-LVL II: ICD-10-PCS | Mod: PBBFAC,,, | Performed by: OPHTHALMOLOGY

## 2021-10-08 PROCEDURE — 99024 POSTOP FOLLOW-UP VISIT: CPT | Mod: S$GLB,,, | Performed by: OPHTHALMOLOGY

## 2021-10-08 PROCEDURE — 99999 PR PBB SHADOW E&M-EST. PATIENT-LVL II: CPT | Mod: PBBFAC,,, | Performed by: OPHTHALMOLOGY

## 2021-10-08 PROCEDURE — 1159F MED LIST DOCD IN RCRD: CPT | Mod: CPTII,S$GLB,, | Performed by: OPHTHALMOLOGY

## 2021-10-08 PROCEDURE — 99024 PR POST-OP FOLLOW-UP VISIT: ICD-10-PCS | Mod: S$GLB,,, | Performed by: OPHTHALMOLOGY

## 2021-10-08 PROCEDURE — 1126F AMNT PAIN NOTED NONE PRSNT: CPT | Mod: CPTII,S$GLB,, | Performed by: OPHTHALMOLOGY

## 2021-10-08 PROCEDURE — 1159F PR MEDICATION LIST DOCUMENTED IN MEDICAL RECORD: ICD-10-PCS | Mod: CPTII,S$GLB,, | Performed by: OPHTHALMOLOGY

## 2021-11-01 ENCOUNTER — TELEPHONE (OUTPATIENT)
Dept: PRIMARY CARE CLINIC | Facility: CLINIC | Age: 71
End: 2021-11-01
Payer: COMMERCIAL

## 2021-11-01 DIAGNOSIS — Z00.00 ROUTINE GENERAL MEDICAL EXAMINATION AT A HEALTH CARE FACILITY: Primary | ICD-10-CM

## 2021-11-10 ENCOUNTER — OFFICE VISIT (OUTPATIENT)
Dept: OPHTHALMOLOGY | Facility: CLINIC | Age: 71
End: 2021-11-10
Attending: OPHTHALMOLOGY
Payer: COMMERCIAL

## 2021-11-10 DIAGNOSIS — Z98.890 POST-OPERATIVE STATE: Primary | ICD-10-CM

## 2021-11-10 DIAGNOSIS — Z98.890 HISTORY OF RADIAL KERATOTOMY: ICD-10-CM

## 2021-11-10 PROCEDURE — 1159F MED LIST DOCD IN RCRD: CPT | Mod: CPTII,S$GLB,, | Performed by: OPHTHALMOLOGY

## 2021-11-10 PROCEDURE — 99999 PR PBB SHADOW E&M-EST. PATIENT-LVL II: ICD-10-PCS | Mod: PBBFAC,,, | Performed by: OPHTHALMOLOGY

## 2021-11-10 PROCEDURE — 1126F AMNT PAIN NOTED NONE PRSNT: CPT | Mod: CPTII,S$GLB,, | Performed by: OPHTHALMOLOGY

## 2021-11-10 PROCEDURE — 99999 PR PBB SHADOW E&M-EST. PATIENT-LVL II: CPT | Mod: PBBFAC,,, | Performed by: OPHTHALMOLOGY

## 2021-11-10 PROCEDURE — 99024 POSTOP FOLLOW-UP VISIT: CPT | Mod: S$GLB,,, | Performed by: OPHTHALMOLOGY

## 2021-11-10 PROCEDURE — 1160F RVW MEDS BY RX/DR IN RCRD: CPT | Mod: CPTII,S$GLB,, | Performed by: OPHTHALMOLOGY

## 2021-11-10 PROCEDURE — 1126F PR PAIN SEVERITY QUANTIFIED, NO PAIN PRESENT: ICD-10-PCS | Mod: CPTII,S$GLB,, | Performed by: OPHTHALMOLOGY

## 2021-11-10 PROCEDURE — 1160F PR REVIEW ALL MEDS BY PRESCRIBER/CLIN PHARMACIST DOCUMENTED: ICD-10-PCS | Mod: CPTII,S$GLB,, | Performed by: OPHTHALMOLOGY

## 2021-11-10 PROCEDURE — 1159F PR MEDICATION LIST DOCUMENTED IN MEDICAL RECORD: ICD-10-PCS | Mod: CPTII,S$GLB,, | Performed by: OPHTHALMOLOGY

## 2021-11-10 PROCEDURE — 99024 PR POST-OP FOLLOW-UP VISIT: ICD-10-PCS | Mod: S$GLB,,, | Performed by: OPHTHALMOLOGY

## 2021-11-18 ENCOUNTER — IMMUNIZATION (OUTPATIENT)
Dept: PHARMACY | Facility: CLINIC | Age: 71
End: 2021-11-18
Payer: COMMERCIAL

## 2021-11-18 DIAGNOSIS — Z23 NEED FOR VACCINATION: Primary | ICD-10-CM

## 2021-12-01 ENCOUNTER — OFFICE VISIT (OUTPATIENT)
Dept: URGENT CARE | Facility: CLINIC | Age: 71
End: 2021-12-01
Payer: COMMERCIAL

## 2021-12-01 VITALS
TEMPERATURE: 98 F | DIASTOLIC BLOOD PRESSURE: 96 MMHG | RESPIRATION RATE: 16 BRPM | HEIGHT: 60 IN | HEART RATE: 77 BPM | WEIGHT: 117 LBS | BODY MASS INDEX: 22.97 KG/M2 | SYSTOLIC BLOOD PRESSURE: 149 MMHG | OXYGEN SATURATION: 97 %

## 2021-12-01 DIAGNOSIS — J06.9 VIRAL URI WITH COUGH: Primary | ICD-10-CM

## 2021-12-01 LAB
CTP QC/QA: YES
CTP QC/QA: YES
POC MOLECULAR INFLUENZA A AGN: NEGATIVE
POC MOLECULAR INFLUENZA B AGN: NEGATIVE
SARS-COV-2 RDRP RESP QL NAA+PROBE: NEGATIVE

## 2021-12-01 PROCEDURE — 99213 OFFICE O/P EST LOW 20 MIN: CPT | Mod: S$GLB,,, | Performed by: FAMILY MEDICINE

## 2021-12-01 PROCEDURE — U0002: ICD-10-PCS | Mod: QW,S$GLB,, | Performed by: FAMILY MEDICINE

## 2021-12-01 PROCEDURE — 99213 PR OFFICE/OUTPT VISIT, EST, LEVL III, 20-29 MIN: ICD-10-PCS | Mod: S$GLB,,, | Performed by: FAMILY MEDICINE

## 2021-12-01 PROCEDURE — 87502 INFLUENZA DNA AMP PROBE: CPT | Mod: QW,S$GLB,, | Performed by: FAMILY MEDICINE

## 2021-12-01 PROCEDURE — U0002 COVID-19 LAB TEST NON-CDC: HCPCS | Mod: QW,S$GLB,, | Performed by: FAMILY MEDICINE

## 2021-12-01 PROCEDURE — 87502 POCT INFLUENZA A/B MOLECULAR: ICD-10-PCS | Mod: QW,S$GLB,, | Performed by: FAMILY MEDICINE

## 2021-12-01 RX ORDER — BENZONATATE 200 MG/1
200 CAPSULE ORAL 3 TIMES DAILY PRN
Qty: 30 CAPSULE | Refills: 0 | Status: SHIPPED | OUTPATIENT
Start: 2021-12-01 | End: 2022-02-08

## 2021-12-02 ENCOUNTER — OFFICE VISIT (OUTPATIENT)
Dept: OPTOMETRY | Facility: CLINIC | Age: 71
End: 2021-12-02
Payer: COMMERCIAL

## 2021-12-02 DIAGNOSIS — Z98.42 S/P BILATERAL CATARACT EXTRACTION: Primary | ICD-10-CM

## 2021-12-02 DIAGNOSIS — Z98.41 S/P BILATERAL CATARACT EXTRACTION: Primary | ICD-10-CM

## 2021-12-02 PROCEDURE — 99024 PR POST-OP FOLLOW-UP VISIT: ICD-10-PCS | Mod: S$GLB,,, | Performed by: OPTOMETRIST

## 2021-12-02 PROCEDURE — 92134 POSTERIOR SEGMENT OCT RETINA (OCULAR COHERENCE TOMOGRAPHY)-BOTH EYES: ICD-10-PCS | Mod: S$GLB,,, | Performed by: OPTOMETRIST

## 2021-12-02 PROCEDURE — 99999 PR PBB SHADOW E&M-EST. PATIENT-LVL II: CPT | Mod: PBBFAC,,, | Performed by: OPTOMETRIST

## 2021-12-02 PROCEDURE — 99999 PR PBB SHADOW E&M-EST. PATIENT-LVL II: ICD-10-PCS | Mod: PBBFAC,,, | Performed by: OPTOMETRIST

## 2021-12-02 PROCEDURE — 99024 POSTOP FOLLOW-UP VISIT: CPT | Mod: S$GLB,,, | Performed by: OPTOMETRIST

## 2021-12-02 PROCEDURE — 92134 CPTRZ OPH DX IMG PST SGM RTA: CPT | Mod: S$GLB,,, | Performed by: OPTOMETRIST

## 2022-01-10 ENCOUNTER — OFFICE VISIT (OUTPATIENT)
Dept: OPTOMETRY | Facility: CLINIC | Age: 72
End: 2022-01-10
Payer: COMMERCIAL

## 2022-01-10 DIAGNOSIS — H52.4 MYOPIA WITH ASTIGMATISM AND PRESBYOPIA, BILATERAL: Primary | ICD-10-CM

## 2022-01-10 DIAGNOSIS — H52.13 MYOPIA WITH ASTIGMATISM AND PRESBYOPIA, BILATERAL: Primary | ICD-10-CM

## 2022-01-10 DIAGNOSIS — H52.203 MYOPIA WITH ASTIGMATISM AND PRESBYOPIA, BILATERAL: Primary | ICD-10-CM

## 2022-01-10 PROCEDURE — 3288F FALL RISK ASSESSMENT DOCD: CPT | Mod: CPTII,S$GLB,, | Performed by: OPTOMETRIST

## 2022-01-10 PROCEDURE — 1101F PT FALLS ASSESS-DOCD LE1/YR: CPT | Mod: CPTII,S$GLB,, | Performed by: OPTOMETRIST

## 2022-01-10 PROCEDURE — 1126F PR PAIN SEVERITY QUANTIFIED, NO PAIN PRESENT: ICD-10-PCS | Mod: CPTII,S$GLB,, | Performed by: OPTOMETRIST

## 2022-01-10 PROCEDURE — 1101F PR PT FALLS ASSESS DOC 0-1 FALLS W/OUT INJ PAST YR: ICD-10-PCS | Mod: CPTII,S$GLB,, | Performed by: OPTOMETRIST

## 2022-01-10 PROCEDURE — 92310 PR CONTACT LENS FITTING (NO CHANGE): ICD-10-PCS | Mod: CSM,,, | Performed by: OPTOMETRIST

## 2022-01-10 PROCEDURE — 3288F PR FALLS RISK ASSESSMENT DOCUMENTED: ICD-10-PCS | Mod: CPTII,S$GLB,, | Performed by: OPTOMETRIST

## 2022-01-10 PROCEDURE — 1126F AMNT PAIN NOTED NONE PRSNT: CPT | Mod: CPTII,S$GLB,, | Performed by: OPTOMETRIST

## 2022-01-10 PROCEDURE — 92310 CONTACT LENS FITTING OU: CPT | Mod: CSM,,, | Performed by: OPTOMETRIST

## 2022-01-10 PROCEDURE — 1159F MED LIST DOCD IN RCRD: CPT | Mod: CPTII,S$GLB,, | Performed by: OPTOMETRIST

## 2022-01-10 PROCEDURE — 99499 UNLISTED E&M SERVICE: CPT | Mod: S$GLB,,, | Performed by: OPTOMETRIST

## 2022-01-10 PROCEDURE — 1159F PR MEDICATION LIST DOCUMENTED IN MEDICAL RECORD: ICD-10-PCS | Mod: CPTII,S$GLB,, | Performed by: OPTOMETRIST

## 2022-01-10 PROCEDURE — 99499 NO LOS: ICD-10-PCS | Mod: S$GLB,,, | Performed by: OPTOMETRIST

## 2022-01-10 NOTE — PROGRESS NOTES
HPI     Last eye exam was 12/2/21 with Dr. Crabtree.  Patient here for SCL trial . First time trying SCL's.   Pataday QD OU        Last edited by Remedios Crabtree, OD on 1/10/2022 11:24 AM. (History)            Assessment /Plan     For exam results, see Encounter Report.    Myopia with astigmatism and presbyopia, bilateral          1.  Dispensed [employer].  Vision and fit good.  Educated on insertion/removal and lens care.  Patient could insert lens but unable to remove.  Contact lens trials kept in the contact lens department.  Ordering new soft trials and RGPs.  Patient wore RGPs 30 years ago.  RTC 2 weeks for contact lens dispense.

## 2022-01-28 DIAGNOSIS — I10 HTN (HYPERTENSION), BENIGN: ICD-10-CM

## 2022-01-28 RX ORDER — MONTELUKAST SODIUM 10 MG/1
TABLET ORAL
Qty: 90 TABLET | Refills: 0 | Status: SHIPPED | OUTPATIENT
Start: 2022-01-28 | End: 2022-02-08 | Stop reason: SDUPTHER

## 2022-01-28 NOTE — TELEPHONE ENCOUNTER
Refill Routing Note   Medication(s) are not appropriate for processing by [facility] for the following reason(s):      - Required vitals are abnormal    [facility] action(s):  Defer Medication-related problems identified: Requires labs     Medication Therapy Plan: CDMR. LABS(CMP, lipids) Bp elevated; defer  --->Care Gap information included in message below if applicable.   Medication reconciliation completed: No   Automatic Epic Generated Protocol Data:    Orders Placed This Encounter    montelukast (SINGULAIR) 10 mg tablet      Requested Prescriptions   Pending Prescriptions Disp Refills    hydroCHLOROthiazide (MICROZIDE) 12.5 mg capsule [Pharmacy Med Name: HYDROCHLOROTHIAZIDE 12.5MG CAPSULES] 90 capsule 0     Sig: TAKE 1 CAPSULE(12.5 MG) BY MOUTH EVERY DAY       Cardiovascular: Diuretics - Thiazide Failed - 1/28/2022  5:19 AM        Failed - Last BP in normal range within 360 days     BP Readings from Last 1 Encounters:   12/01/21 (!) 149/96               Failed - Cr is 1.39 or below and within 360 days     Lab Results   Component Value Date    CREATININE 0.7 02/01/2021    CREATININE 0.7 01/10/2020    CREATININE 0.7 02/12/2019              Failed - K in normal range and within 360 days     Potassium   Date Value Ref Range Status   02/01/2021 3.9 3.5 - 5.1 mmol/L Final   01/10/2020 4.5 3.5 - 5.1 mmol/L Final   02/12/2019 3.9 3.5 - 5.1 mmol/L Final              Failed - Na is between 130 and 148 and within 360 days     Sodium   Date Value Ref Range Status   02/01/2021 143 136 - 145 mmol/L Final   01/10/2020 143 136 - 145 mmol/L Final   02/12/2019 143 136 - 145 mmol/L Final              Failed - eGFR within 360 days     Lab Results   Component Value Date    EGFRNONAA >60.0 02/01/2021    EGFRNONAA >60.0 01/10/2020    EGFRNONAA >60.0 02/12/2019                Passed - Patient is at least 18 years old        Passed - Valid encounter within last 15 months     Recent Visits  Date Type Provider Dept   02/04/21  Office Visit Cindi Wolf MD [facility]   Showing recent visits within past 720 days and meeting all other requirements  Future Appointments  No visits were found meeting these conditions.  Showing future appointments within next 150 days and meeting all other requirements      Future Appointments              In 4 days LAB, [place] [facility] [place]    In 1 week [facility]                 Signed Prescriptions Disp Refills    montelukast (SINGULAIR) 10 mg tablet 90 tablet 0     Sig: TAKE 1 TABLET(10 MG) BY MOUTH EVERY DAY       Pulmonology:  Leukotriene Inhibitors Passed - 1/28/2022  5:19 AM        Passed - Patient is at least 18 years old        Passed - Valid encounter within last 15 months     Recent Visits  Date Type Provider Dept   02/04/21 Office Visit Cindi Wolf MD [facility]   Showing recent visits within past 720 days and meeting all other requirements  Future Appointments  No visits were found meeting these conditions.  Showing future appointments within next 150 days and meeting all other requirements      Future Appointments              In 4 days LAB, [place] [facility] [place]    In 1 week Cindi Wolf MD [facility] [place]                      Appointments  past 12m or future 3m with PCP    Date Provider   Last Visit   2/4/2021 Cindi Wolf MD   Next Visit   2/8/2022 Cindi Wolf MD   ED visits in past 90 days: 0        Note composed:2:11 PM 01/28/2022

## 2022-01-28 NOTE — TELEPHONE ENCOUNTER
Care Due:                  Date            Visit Type   Department     Provider  --------------------------------------------------------------------------------                                             [facility] PRIMARY  Last Visit: 02-      None         [facility]           Cindi Wolf                                           [facility] PRIMARY  Next Visit: 02-      [facility]           Cindi Wolf                                                            Last  Test          Frequency    Reason                     Performed    Due Date  --------------------------------------------------------------------------------    CMP.........  12 months..  atorvastatin,              Not Found    Overdue                             hydroCHLOROthiazide......    Lipid Panel.  12 months..  atorvastatin.............  Not Found    Overdue    Powered by [employer]. Reference number: [id number].   1/28/2022 5:19:30 AM CST

## 2022-01-31 ENCOUNTER — PATIENT MESSAGE (OUTPATIENT)
Dept: PRIMARY CARE CLINIC | Facility: CLINIC | Age: 72
End: 2022-01-31
Payer: COMMERCIAL

## 2022-01-31 RX ORDER — HYDROCHLOROTHIAZIDE 12.5 MG/1
CAPSULE ORAL
Qty: 90 CAPSULE | Refills: 0 | Status: SHIPPED | OUTPATIENT
Start: 2022-01-31 | End: 2022-02-08 | Stop reason: SDUPTHER

## 2022-02-01 ENCOUNTER — LAB VISIT (OUTPATIENT)
Dept: LAB | Facility: HOSPITAL | Age: 72
End: 2022-02-01
Attending: OPTOMETRIST
Payer: COMMERCIAL

## 2022-02-01 DIAGNOSIS — Z00.00 ROUTINE GENERAL MEDICAL EXAMINATION AT A HEALTH CARE FACILITY: ICD-10-CM

## 2022-02-01 LAB
ALBUMIN SERPL BCP-MCNC: 3.8 G/DL (ref 3.5–5.2)
ALP SERPL-CCNC: 72 U/L (ref 55–135)
ALT SERPL W/O P-5'-P-CCNC: 14 U/L (ref 10–44)
ANION GAP SERPL CALC-SCNC: 8 MMOL/L (ref 8–16)
AST SERPL-CCNC: 18 U/L (ref 10–40)
BASOPHILS # BLD AUTO: 0.03 K/UL (ref 0–0.2)
BASOPHILS NFR BLD: 0.6 % (ref 0–1.9)
BILIRUB SERPL-MCNC: 0.4 MG/DL (ref 0.1–1)
BUN SERPL-MCNC: 18 MG/DL (ref 8–23)
CALCIUM SERPL-MCNC: 9.6 MG/DL (ref 8.7–10.5)
CHLORIDE SERPL-SCNC: 105 MMOL/L (ref 95–110)
CHOLEST SERPL-MCNC: 167 MG/DL (ref 120–199)
CHOLEST/HDLC SERPL: 2.2 {RATIO} (ref 2–5)
CO2 SERPL-SCNC: 30 MMOL/L (ref 23–29)
CREAT SERPL-MCNC: 0.7 MG/DL (ref 0.5–1.4)
DIFFERENTIAL METHOD: NORMAL
EOSINOPHIL # BLD AUTO: 0.2 K/UL (ref 0–0.5)
EOSINOPHIL NFR BLD: 4.5 % (ref 0–8)
ERYTHROCYTE [DISTWIDTH] IN BLOOD BY AUTOMATED COUNT: 12.2 % (ref 11.5–14.5)
EST. GFR  (AFRICAN AMERICAN): >60 ML/MIN/1.73 M^2
EST. GFR  (NON AFRICAN AMERICAN): >60 ML/MIN/1.73 M^2
GLUCOSE SERPL-MCNC: 92 MG/DL (ref 70–110)
HCT VFR BLD AUTO: 46.7 % (ref 37–48.5)
HDLC SERPL-MCNC: 76 MG/DL (ref 40–75)
HDLC SERPL: 45.5 % (ref 20–50)
HGB BLD-MCNC: 15.1 G/DL (ref 12–16)
IMM GRANULOCYTES # BLD AUTO: 0 K/UL (ref 0–0.04)
IMM GRANULOCYTES NFR BLD AUTO: 0 % (ref 0–0.5)
LDLC SERPL CALC-MCNC: 79.8 MG/DL (ref 63–159)
LYMPHOCYTES # BLD AUTO: 1.8 K/UL (ref 1–4.8)
LYMPHOCYTES NFR BLD: 35.1 % (ref 18–48)
MCH RBC QN AUTO: 31 PG (ref 27–31)
MCHC RBC AUTO-ENTMCNC: 32.3 G/DL (ref 32–36)
MCV RBC AUTO: 96 FL (ref 82–98)
MONOCYTES # BLD AUTO: 0.4 K/UL (ref 0.3–1)
MONOCYTES NFR BLD: 8.6 % (ref 4–15)
NEUTROPHILS # BLD AUTO: 2.6 K/UL (ref 1.8–7.7)
NEUTROPHILS NFR BLD: 51.2 % (ref 38–73)
NONHDLC SERPL-MCNC: 91 MG/DL
NRBC BLD-RTO: 0 /100 WBC
PLATELET # BLD AUTO: 196 K/UL (ref 150–450)
PMV BLD AUTO: 11.5 FL (ref 9.2–12.9)
POTASSIUM SERPL-SCNC: 3.6 MMOL/L (ref 3.5–5.1)
PROT SERPL-MCNC: 6.8 G/DL (ref 6–8.4)
RBC # BLD AUTO: 4.87 M/UL (ref 4–5.4)
SODIUM SERPL-SCNC: 143 MMOL/L (ref 136–145)
TRIGL SERPL-MCNC: 56 MG/DL (ref 30–150)
WBC # BLD AUTO: 5.13 K/UL (ref 3.9–12.7)

## 2022-02-01 PROCEDURE — 85025 COMPLETE CBC W/AUTO DIFF WBC: CPT | Performed by: FAMILY MEDICINE

## 2022-02-01 PROCEDURE — 80053 COMPREHEN METABOLIC PANEL: CPT | Performed by: FAMILY MEDICINE

## 2022-02-01 PROCEDURE — 80061 LIPID PANEL: CPT | Performed by: FAMILY MEDICINE

## 2022-02-01 PROCEDURE — 36415 COLL VENOUS BLD VENIPUNCTURE: CPT | Mod: PN | Performed by: FAMILY MEDICINE

## 2022-02-08 ENCOUNTER — OFFICE VISIT (OUTPATIENT)
Dept: PRIMARY CARE CLINIC | Facility: CLINIC | Age: 72
End: 2022-02-08
Payer: COMMERCIAL

## 2022-02-08 VITALS
WEIGHT: 116.63 LBS | OXYGEN SATURATION: 97 % | TEMPERATURE: 98 F | HEIGHT: 60 IN | BODY MASS INDEX: 22.9 KG/M2 | HEART RATE: 74 BPM | SYSTOLIC BLOOD PRESSURE: 128 MMHG | DIASTOLIC BLOOD PRESSURE: 82 MMHG

## 2022-02-08 DIAGNOSIS — Z12.31 OTHER SCREENING MAMMOGRAM: ICD-10-CM

## 2022-02-08 DIAGNOSIS — Z00.00 ROUTINE GENERAL MEDICAL EXAMINATION AT A HEALTH CARE FACILITY: Primary | ICD-10-CM

## 2022-02-08 DIAGNOSIS — E78.5 HYPERLIPIDEMIA, UNSPECIFIED HYPERLIPIDEMIA TYPE: ICD-10-CM

## 2022-02-08 DIAGNOSIS — I10 HTN (HYPERTENSION), BENIGN: ICD-10-CM

## 2022-02-08 DIAGNOSIS — M81.0 OSTEOPOROSIS, SENILE: ICD-10-CM

## 2022-02-08 DIAGNOSIS — H10.13 ALLERGIC CONJUNCTIVITIS, BILATERAL: ICD-10-CM

## 2022-02-08 DIAGNOSIS — E78.2 MIXED HYPERLIPIDEMIA: ICD-10-CM

## 2022-02-08 PROCEDURE — 1160F RVW MEDS BY RX/DR IN RCRD: CPT | Mod: CPTII,S$GLB,, | Performed by: FAMILY MEDICINE

## 2022-02-08 PROCEDURE — 1160F PR REVIEW ALL MEDS BY PRESCRIBER/CLIN PHARMACIST DOCUMENTED: ICD-10-PCS | Mod: CPTII,S$GLB,, | Performed by: FAMILY MEDICINE

## 2022-02-08 PROCEDURE — 1159F MED LIST DOCD IN RCRD: CPT | Mod: CPTII,S$GLB,, | Performed by: FAMILY MEDICINE

## 2022-02-08 PROCEDURE — 1159F PR MEDICATION LIST DOCUMENTED IN MEDICAL RECORD: ICD-10-PCS | Mod: CPTII,S$GLB,, | Performed by: FAMILY MEDICINE

## 2022-02-08 PROCEDURE — 3008F BODY MASS INDEX DOCD: CPT | Mod: CPTII,S$GLB,, | Performed by: FAMILY MEDICINE

## 2022-02-08 PROCEDURE — 99397 PER PM REEVAL EST PAT 65+ YR: CPT | Mod: S$GLB,,, | Performed by: FAMILY MEDICINE

## 2022-02-08 PROCEDURE — 3008F PR BODY MASS INDEX (BMI) DOCUMENTED: ICD-10-PCS | Mod: CPTII,S$GLB,, | Performed by: FAMILY MEDICINE

## 2022-02-08 PROCEDURE — 3074F PR MOST RECENT SYSTOLIC BLOOD PRESSURE < 130 MM HG: ICD-10-PCS | Mod: CPTII,S$GLB,, | Performed by: FAMILY MEDICINE

## 2022-02-08 PROCEDURE — 1126F AMNT PAIN NOTED NONE PRSNT: CPT | Mod: CPTII,S$GLB,, | Performed by: FAMILY MEDICINE

## 2022-02-08 PROCEDURE — 1101F PR PT FALLS ASSESS DOC 0-1 FALLS W/OUT INJ PAST YR: ICD-10-PCS | Mod: CPTII,S$GLB,, | Performed by: FAMILY MEDICINE

## 2022-02-08 PROCEDURE — 3074F SYST BP LT 130 MM HG: CPT | Mod: CPTII,S$GLB,, | Performed by: FAMILY MEDICINE

## 2022-02-08 PROCEDURE — 1101F PT FALLS ASSESS-DOCD LE1/YR: CPT | Mod: CPTII,S$GLB,, | Performed by: FAMILY MEDICINE

## 2022-02-08 PROCEDURE — 1126F PR PAIN SEVERITY QUANTIFIED, NO PAIN PRESENT: ICD-10-PCS | Mod: CPTII,S$GLB,, | Performed by: FAMILY MEDICINE

## 2022-02-08 PROCEDURE — 99999 PR PBB SHADOW E&M-EST. PATIENT-LVL IV: CPT | Mod: PBBFAC,,, | Performed by: FAMILY MEDICINE

## 2022-02-08 PROCEDURE — 3079F PR MOST RECENT DIASTOLIC BLOOD PRESSURE 80-89 MM HG: ICD-10-PCS | Mod: CPTII,S$GLB,, | Performed by: FAMILY MEDICINE

## 2022-02-08 PROCEDURE — 99397 PR PREVENTIVE VISIT,EST,65 & OVER: ICD-10-PCS | Mod: S$GLB,,, | Performed by: FAMILY MEDICINE

## 2022-02-08 PROCEDURE — 99999 PR PBB SHADOW E&M-EST. PATIENT-LVL IV: ICD-10-PCS | Mod: PBBFAC,,, | Performed by: FAMILY MEDICINE

## 2022-02-08 PROCEDURE — 3079F DIAST BP 80-89 MM HG: CPT | Mod: CPTII,S$GLB,, | Performed by: FAMILY MEDICINE

## 2022-02-08 PROCEDURE — 3288F FALL RISK ASSESSMENT DOCD: CPT | Mod: CPTII,S$GLB,, | Performed by: FAMILY MEDICINE

## 2022-02-08 PROCEDURE — 3288F PR FALLS RISK ASSESSMENT DOCUMENTED: ICD-10-PCS | Mod: CPTII,S$GLB,, | Performed by: FAMILY MEDICINE

## 2022-02-08 RX ORDER — RISEDRONATE SODIUM 150 MG/1
TABLET, FILM COATED ORAL
Qty: 3 TABLET | Refills: 3 | Status: SHIPPED | OUTPATIENT
Start: 2022-02-08 | End: 2023-02-03

## 2022-02-08 RX ORDER — ATORVASTATIN CALCIUM 10 MG/1
10 TABLET, FILM COATED ORAL DAILY
Qty: 90 TABLET | Refills: 3 | Status: SHIPPED | OUTPATIENT
Start: 2022-02-08 | End: 2022-04-04

## 2022-02-08 RX ORDER — MONTELUKAST SODIUM 10 MG/1
10 TABLET ORAL DAILY
Qty: 90 TABLET | Refills: 3 | Status: SHIPPED | OUTPATIENT
Start: 2022-02-08 | End: 2023-02-02

## 2022-02-08 RX ORDER — HYDROCHLOROTHIAZIDE 12.5 MG/1
CAPSULE ORAL
Qty: 90 CAPSULE | Refills: 3 | Status: SHIPPED | OUTPATIENT
Start: 2022-02-08 | End: 2022-08-01
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
## 2022-02-22 ENCOUNTER — HOSPITAL ENCOUNTER (OUTPATIENT)
Dept: RADIOLOGY | Facility: HOSPITAL | Age: 72
Discharge: HOME | End: 2022-02-22
Attending: FAMILY MEDICINE
Payer: COMMERCIAL

## 2022-02-22 DIAGNOSIS — Z12.31 OTHER SCREENING MAMMOGRAM: ICD-10-CM

## 2022-02-22 PROCEDURE — 77067 SCR MAMMO BI INCL CAD: CPT | Mod: 26,,, | Performed by: RADIOLOGY

## 2022-02-22 PROCEDURE — 77063 BREAST TOMOSYNTHESIS BI: CPT | Mod: 26,,, | Performed by: RADIOLOGY

## 2022-02-22 PROCEDURE — 77067 SCR MAMMO BI INCL CAD: CPT | Mod: TC,PN

## 2022-02-22 PROCEDURE — 77063 MAMMO DIGITAL SCREENING BILAT WITH TOMO: ICD-10-PCS | Mod: 26,,, | Performed by: RADIOLOGY

## 2022-02-22 PROCEDURE — 77067 MAMMO DIGITAL SCREENING BILAT WITH TOMO: ICD-10-PCS | Mod: 26,,, | Performed by: RADIOLOGY

## 2022-03-07 ENCOUNTER — OFFICE VISIT (OUTPATIENT)
Dept: OPTOMETRY | Facility: CLINIC | Age: 72
End: 2022-03-07
Payer: COMMERCIAL

## 2022-03-07 DIAGNOSIS — H52.203 MYOPIA WITH ASTIGMATISM AND PRESBYOPIA, BILATERAL: Primary | ICD-10-CM

## 2022-03-07 DIAGNOSIS — H52.13 MYOPIA WITH ASTIGMATISM AND PRESBYOPIA, BILATERAL: Primary | ICD-10-CM

## 2022-03-07 DIAGNOSIS — H52.4 MYOPIA WITH ASTIGMATISM AND PRESBYOPIA, BILATERAL: Primary | ICD-10-CM

## 2022-03-07 PROCEDURE — 92499 PR CONTACT LENS F/U LEV 1: ICD-10-PCS | Mod: S$GLB,,, | Performed by: OPTOMETRIST

## 2022-03-07 PROCEDURE — 1159F MED LIST DOCD IN RCRD: CPT | Mod: CPTII,S$GLB,, | Performed by: OPTOMETRIST

## 2022-03-07 PROCEDURE — 1159F PR MEDICATION LIST DOCUMENTED IN MEDICAL RECORD: ICD-10-PCS | Mod: CPTII,S$GLB,, | Performed by: OPTOMETRIST

## 2022-03-07 PROCEDURE — 1101F PT FALLS ASSESS-DOCD LE1/YR: CPT | Mod: CPTII,S$GLB,, | Performed by: OPTOMETRIST

## 2022-03-07 PROCEDURE — 3288F PR FALLS RISK ASSESSMENT DOCUMENTED: ICD-10-PCS | Mod: CPTII,S$GLB,, | Performed by: OPTOMETRIST

## 2022-03-07 PROCEDURE — 1126F AMNT PAIN NOTED NONE PRSNT: CPT | Mod: CPTII,S$GLB,, | Performed by: OPTOMETRIST

## 2022-03-07 PROCEDURE — 1101F PR PT FALLS ASSESS DOC 0-1 FALLS W/OUT INJ PAST YR: ICD-10-PCS | Mod: CPTII,S$GLB,, | Performed by: OPTOMETRIST

## 2022-03-07 PROCEDURE — 3288F FALL RISK ASSESSMENT DOCD: CPT | Mod: CPTII,S$GLB,, | Performed by: OPTOMETRIST

## 2022-03-07 PROCEDURE — 92499 UNLISTED OPH SVC/PROCEDURE: CPT | Mod: S$GLB,,, | Performed by: OPTOMETRIST

## 2022-03-07 PROCEDURE — 1126F PR PAIN SEVERITY QUANTIFIED, NO PAIN PRESENT: ICD-10-PCS | Mod: CPTII,S$GLB,, | Performed by: OPTOMETRIST

## 2022-03-07 NOTE — PROGRESS NOTES
HPI     Last eye exam was 1/10/22 with Dr. Crabtree.  Patient states unable to remove SCL's and wanted to try RGP's. Previously   wore RGP's before RK surgery.    Last edited by Abimbola Crawley MA on 3/7/2022  9:19 AM. (History)            Assessment /Plan     For exam results, see Encounter Report.    Myopia with astigmatism and presbyopia, bilateral            1.  Dispensed RGPs.  Educated pt to wear only around the house.  Vision is unstable--lenses need to settle and go cleaned a few times.  Ordering replacement for left niesha with adjustments to fit.  Patient saw better with soft lenses but had trouble with insertion/removal.  RTC 2 weeks for dispense and check.

## 2022-03-10 ENCOUNTER — APPOINTMENT (OUTPATIENT)
Dept: RADIOLOGY | Facility: CLINIC | Age: 72
End: 2022-03-10
Attending: FAMILY MEDICINE
Payer: COMMERCIAL

## 2022-03-10 DIAGNOSIS — M81.0 OSTEOPOROSIS, SENILE: ICD-10-CM

## 2022-03-10 PROCEDURE — 77080 DEXA BONE DENSITY SPINE HIP: ICD-10-PCS | Mod: 26,,, | Performed by: INTERNAL MEDICINE

## 2022-03-10 PROCEDURE — 77080 DXA BONE DENSITY AXIAL: CPT | Mod: 26,,, | Performed by: INTERNAL MEDICINE

## 2022-03-10 PROCEDURE — 77080 DXA BONE DENSITY AXIAL: CPT | Mod: TC,PO

## 2022-03-17 ENCOUNTER — TELEPHONE (OUTPATIENT)
Dept: OPTOMETRY | Facility: CLINIC | Age: 72
End: 2022-03-17
Payer: COMMERCIAL

## 2022-03-29 NOTE — PROGRESS NOTES
Your Bone density test shows OSTEOPOROSIS    Continue medication    WEIGHT BEARING EXERCISE (carrying light weights) is the BEST way to strengthen the bone where your muscles insert & prevent future fractures.     Focus on 1200 mg of CALCIUM daily  - Which is the equivalent of 3 glasses of milk daily. If you cannot tolerate this, you can substitute yogurt or cheese for one of these servings.  Eat foods rich in calcium.Also you can take TUMS supplements or Viactiv chocolate chews calcium supplement.     Also, 800 units of VITAMIN D daily - This is the equivalent of 10 minutes of direct sunlight daily. If you are not in the sun, consider Foods rich in vitamin D and over the counter  supplement .

## 2022-06-22 ENCOUNTER — TELEPHONE (OUTPATIENT)
Dept: PRIMARY CARE CLINIC | Facility: CLINIC | Age: 72
End: 2022-06-22
Payer: COMMERCIAL

## 2022-06-22 NOTE — TELEPHONE ENCOUNTER
"3    Paxlovid not recommended if pt takes Atorvastatin , as it can interfere     Ok to put in order for EUA if she desires, but most patients get better in 7 days  with OTC meds    =========    An UPPER RESPIRATORY ILLNESS is initially caused by a virus or allergies 95% of the time. The goal to help you feel better is drying up the drainage & stopping the cough so the virus can run it's course in about 10 days. Lingering cough can last up to 21 days. If your drainage becomes more thick and worse after 7-10 days of trying the below  over the counter medications, please make an appointment for further evaluation    If you have POST NASAL DRIP , "RUNNY NOSE" or SORE THROAT FROM CLEARING YOUR THROAT :  Take an ANTIHISTAMINE  (Claritin or Zyrtec or Allegra ) AT NIGHT    Nasal Saline: Tilt head back and squirt into nostril 2-3 times until you taste saline in back of throat. Spit, and blow nose. Do this 4 times, alternating right and left nostril.   Do this routine 2-3 times per day.     NASAL SPRAY - Ipratropium , Nasacort (nasal steroid) or Flonase (nasal steroid)  Twice daily to decrease swelling & post nasal drip ------ very safe , works where the congestion is , & does not interfere with other medication or go through your blood stream. Consider prescription Ipratropium  Bromide nasal 0.03%    If you STILL HAVE DRAINAGE or EAR POPPING/ PRESSURE/ DECREASED HEARING  You can add a DECONGESTANT like Sudafed (if it doesn't cause palpitations, do NOT take this is you have high blood pressure or atrial fibrillation) or Sudafed PE  In the MORNING.     If you have THICK MUCOUS DRAINAGE from the NOSE or COUGHING UP THICK PHLEGM:  You can add a MUCOLYTIC like MUCINEX plain (guaifenesin) 1200mg twice a day    If your COUGH PERSISTS:  Nasal Spray - Ipratropium 4 times a day  Acetylcysteine reduces cough  You can add a COUGH SUPPRESSANT like DELSYM (dextromethorphan) twice a day    If you have PAIN, SORE THROAT, FEVER OR " "CHILLS:  You can ALTERNATE 250- 500 mg of  TYLENOL with  200-400 mg of IBUPROFEN every 3 hours - for the next 3 days  Discontinue and call me if  you have stomach upset    Zinc acetate or gluconate 80-92mg daily  helps with general symptoms    Lactobacillus Casei 200g daily fermented dairy product containing L. Casei  - lessened length of cold symptoms    For SORE THROAT , try: Gargle with warm salt water 2-3 times per day.     Drink lots of WATER until your urine is clear because all of the above medications can "dry you out" and cause constipation.     ================    If you're suddenly feeling worse,  please proceed to urgent care where a doctor can evaluate you         "

## 2022-06-22 NOTE — TELEPHONE ENCOUNTER
lov 2/8/22  Pt tested positive for covid last night and would like to know if you can prescribe her new treatment for covid.

## 2022-06-22 NOTE — TELEPHONE ENCOUNTER
----- Message from Mirta Mcfadden sent at 6/22/2022  9:26 AM CDT -----  Contact: [phone]  Pt tested positive for covid last night and would like to know if you can prescribe her new treatment for covid. She was unsure of medication name. Please advise.       [id number] [place], [place] - [address] [facility] [address]  [facility] [address]  Phone: [phone] Fax: [phone]

## 2022-06-22 NOTE — TELEPHONE ENCOUNTER
Pt was informed and expressed understanding. Sent her the list of otc meds via [employer]. She will let us know if she would like orders for infusion

## 2022-08-01 DIAGNOSIS — I10 HTN (HYPERTENSION), BENIGN: ICD-10-CM

## 2022-08-01 RX ORDER — HYDROCHLOROTHIAZIDE 12.5 MG/1
CAPSULE ORAL
Qty: 90 CAPSULE | Refills: 2 | Status: SHIPPED | OUTPATIENT
Start: 2022-08-01 | End: 2023-05-01

## 2022-08-01 NOTE — TELEPHONE ENCOUNTER
No new care gaps identified.  [facility] Embedded Care Gaps. Reference number: [id number]. 8/01/2022   11:31:10 AM CDT

## 2022-08-01 NOTE — TELEPHONE ENCOUNTER
Refill Decision Note   Yuliana Soares  is requesting a refill authorization.  Brief Assessment and Rationale for Refill:  Approve     Medication Therapy Plan:       Medication Reconciliation Completed: No   Comments:     No Care Gaps recommended.     Note composed:11:40 AM 08/01/2022

## 2022-08-02 ENCOUNTER — PATIENT MESSAGE (OUTPATIENT)
Dept: OPTOMETRY | Facility: CLINIC | Age: 72
End: 2022-08-02
Payer: COMMERCIAL

## 2022-11-14 ENCOUNTER — TELEPHONE (OUTPATIENT)
Dept: PRIMARY CARE CLINIC | Facility: CLINIC | Age: 72
End: 2022-11-14
Payer: COMMERCIAL

## 2022-11-14 NOTE — TELEPHONE ENCOUNTER
Pt advised NP jann does not do labs prior to appointment. Appt rescheduled for 02/02/2023. Pt aware last Annual was 02/08/2022

## 2022-11-14 NOTE — TELEPHONE ENCOUNTER
----- Message from Mary Morelos sent at 11/14/2022  2:44 PM CST -----  Contact: Patient @ [phone]  type: Lab    Caller is requesting to schedule their Lab appointment prior to annual appointment.  Order is not listed in EPIC.  Please enter order and contact patient to schedule.    Name of Caller:Patient     Preferred Date and Time of Labs:02/2/2023 8:45 am     Date of Annual Physical Appointment:02/09/2023    Where would they like the lab performed?[facility] [place]     Would the patient rather a call back or a response via [facility]     Best Call Back Number:[phone]    Additional Information:no

## 2022-11-15 ENCOUNTER — IMMUNIZATION (OUTPATIENT)
Dept: PHARMACY | Facility: CLINIC | Age: 72
End: 2022-11-15
Payer: COMMERCIAL

## 2022-11-15 DIAGNOSIS — Z23 NEED FOR VACCINATION: Primary | ICD-10-CM

## 2023-01-02 ENCOUNTER — OFFICE VISIT (OUTPATIENT)
Dept: URGENT CARE | Facility: CLINIC | Age: 73
End: 2023-01-02
Payer: COMMERCIAL

## 2023-01-02 VITALS
WEIGHT: 116 LBS | TEMPERATURE: 99 F | DIASTOLIC BLOOD PRESSURE: 89 MMHG | HEART RATE: 75 BPM | OXYGEN SATURATION: 95 % | BODY MASS INDEX: 22.78 KG/M2 | SYSTOLIC BLOOD PRESSURE: 146 MMHG | RESPIRATION RATE: 18 BRPM | HEIGHT: 60 IN

## 2023-01-02 DIAGNOSIS — S22.41XA CLOSED FRACTURE OF MULTIPLE RIBS OF RIGHT SIDE, INITIAL ENCOUNTER: ICD-10-CM

## 2023-01-02 DIAGNOSIS — M81.0 OSTEOPOROSIS, UNSPECIFIED OSTEOPOROSIS TYPE, UNSPECIFIED PATHOLOGICAL FRACTURE PRESENCE: ICD-10-CM

## 2023-01-02 DIAGNOSIS — R07.81 RIB PAIN ON RIGHT SIDE: ICD-10-CM
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
## 2023-01-02 DIAGNOSIS — J06.9 VIRAL URI WITH COUGH: Primary | ICD-10-CM

## 2023-01-02 DIAGNOSIS — R05.9 COUGH, UNSPECIFIED TYPE: ICD-10-CM

## 2023-01-02 LAB
CTP QC/QA: YES
SARS-COV-2 AG RESP QL IA.RAPID: NEGATIVE

## 2023-01-02 PROCEDURE — 3077F SYST BP >= 140 MM HG: CPT | Mod: CPTII,S$GLB,, | Performed by: NURSE PRACTITIONER

## 2023-01-02 PROCEDURE — 99214 OFFICE O/P EST MOD 30 MIN: CPT | Mod: S$GLB,,, | Performed by: NURSE PRACTITIONER

## 2023-01-02 PROCEDURE — 1159F PR MEDICATION LIST DOCUMENTED IN MEDICAL RECORD: ICD-10-PCS | Mod: CPTII,S$GLB,, | Performed by: NURSE PRACTITIONER

## 2023-01-02 PROCEDURE — 1125F PR PAIN SEVERITY QUANTIFIED, PAIN PRESENT: ICD-10-PCS | Mod: CPTII,S$GLB,, | Performed by: NURSE PRACTITIONER

## 2023-01-02 PROCEDURE — 1125F AMNT PAIN NOTED PAIN PRSNT: CPT | Mod: CPTII,S$GLB,, | Performed by: NURSE PRACTITIONER

## 2023-01-02 PROCEDURE — 1160F RVW MEDS BY RX/DR IN RCRD: CPT | Mod: CPTII,S$GLB,, | Performed by: NURSE PRACTITIONER

## 2023-01-02 PROCEDURE — 1160F PR REVIEW ALL MEDS BY PRESCRIBER/CLIN PHARMACIST DOCUMENTED: ICD-10-PCS | Mod: CPTII,S$GLB,, | Performed by: NURSE PRACTITIONER

## 2023-01-02 PROCEDURE — 3079F DIAST BP 80-89 MM HG: CPT | Mod: CPTII,S$GLB,, | Performed by: NURSE PRACTITIONER

## 2023-01-02 PROCEDURE — 71100 X-RAY EXAM RIBS UNI 2 VIEWS: CPT | Mod: RT,S$GLB,, | Performed by: RADIOLOGY

## 2023-01-02 PROCEDURE — 87811 SARS CORONAVIRUS 2 ANTIGEN POCT, MANUAL READ: ICD-10-PCS | Mod: QW,S$GLB,, | Performed by: NURSE PRACTITIONER

## 2023-01-02 PROCEDURE — 3008F PR BODY MASS INDEX (BMI) DOCUMENTED: ICD-10-PCS | Mod: CPTII,S$GLB,, | Performed by: NURSE PRACTITIONER

## 2023-01-02 PROCEDURE — 3008F BODY MASS INDEX DOCD: CPT | Mod: CPTII,S$GLB,, | Performed by: NURSE PRACTITIONER

## 2023-01-02 PROCEDURE — 87811 SARS-COV-2 COVID19 W/OPTIC: CPT | Mod: QW,S$GLB,, | Performed by: NURSE PRACTITIONER

## 2023-01-02 PROCEDURE — 1159F MED LIST DOCD IN RCRD: CPT | Mod: CPTII,S$GLB,, | Performed by: NURSE PRACTITIONER

## 2023-01-02 PROCEDURE — 99214 PR OFFICE/OUTPT VISIT, EST, LEVL IV, 30-39 MIN: ICD-10-PCS | Mod: S$GLB,,, | Performed by: NURSE PRACTITIONER

## 2023-01-02 PROCEDURE — 71100 XR RIBS 2 VIEW RIGHT: ICD-10-PCS | Mod: RT,S$GLB,, | Performed by: RADIOLOGY

## 2023-01-02 PROCEDURE — 3079F PR MOST RECENT DIASTOLIC BLOOD PRESSURE 80-89 MM HG: ICD-10-PCS | Mod: CPTII,S$GLB,, | Performed by: NURSE PRACTITIONER

## 2023-01-02 PROCEDURE — 3077F PR MOST RECENT SYSTOLIC BLOOD PRESSURE >= 140 MM HG: ICD-10-PCS | Mod: CPTII,S$GLB,, | Performed by: NURSE PRACTITIONER

## 2023-01-02 RX ORDER — BENZONATATE 100 MG/1
100 CAPSULE ORAL EVERY 6 HOURS PRN
Qty: 30 CAPSULE | Refills: 0 | Status: SHIPPED | OUTPATIENT
Start: 2023-01-02 | End: 2023-02-02

## 2023-01-02 RX ORDER — IPRATROPIUM BROMIDE 21 UG/1
1 SPRAY, METERED NASAL 2 TIMES DAILY
Qty: 30 ML | Refills: 0 | Status: SHIPPED | OUTPATIENT
Start: 2023-01-02 | End: 2023-01-09
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
## 2023-01-02 NOTE — PROGRESS NOTES
Subjective:       Patient ID: Yuliana Soares is a 72 y.o. female.    Vitals:  height is 5' (1.524 m) and weight is 52.6 kg (116 lb). Her blood pressure is 146/89 (abnormal) and her pulse is 75. Her respiration is 18 and oxygen saturation is 95%.     Chief Complaint: Cough (/)    Pt states symptoms began 12/25    Provider note begins below:      Patient comes to the clinic with complaint of 1 week of sinus congestion, cough and headache.  Significant other is also ill with similar symptoms.  No difficulty breathing or shortness of breath.  Patient is immunized against COVID-19.    Pt also endorses fall from standing on to her right side with focal right sided chest pain.    Cough  This is a new problem. The current episode started 1 to 4 weeks ago. The problem has been unchanged. The problem occurs constantly. The cough is Productive of sputum. Associated symptoms include headaches, nasal congestion and postnasal drip. Pertinent negatives include no chest pain, chills, ear congestion, ear pain, fever, heartburn, hemoptysis, myalgias, rash, rhinorrhea, sore throat, shortness of breath, sweats, weight loss or wheezing. Treatments tried: dayquil,nyquil. There is no history of asthma, bronchiectasis, bronchitis, COPD, emphysema, environmental allergies or pneumonia.     Constitution: Negative for chills and fever.   HENT:  Positive for congestion, postnasal drip, sinus pain and sinus pressure. Negative for ear pain and sore throat.    Cardiovascular:  Negative for chest pain.   Respiratory:  Positive for cough. Negative for bloody sputum, shortness of breath and wheezing.    Gastrointestinal:  Negative for heartburn.   Musculoskeletal:  Negative for muscle ache.   Skin:  Negative for rash.   Allergic/Immunologic: Negative for environmental allergies.   Neurological:  Positive for headaches.     Objective:      Physical Exam   Constitutional: She is oriented to person, place, and time. She appears well-developed.  She is cooperative.  Non-toxic appearance. She does not appear ill. No distress.   HENT:   Head: Normocephalic and atraumatic.   Ears:   Right Ear: Hearing, tympanic membrane, external ear and ear canal normal.   Left Ear: Hearing, tympanic membrane, external ear and ear canal normal.   Nose: Rhinorrhea present. No mucosal edema or nasal deformity. No epistaxis. Right sinus exhibits no maxillary sinus tenderness and no frontal sinus tenderness. Left sinus exhibits no maxillary sinus tenderness and no frontal sinus tenderness.   Mouth/Throat: Uvula is midline, oropharynx is clear and moist and mucous membranes are normal. No trismus in the jaw. Normal dentition. No uvula swelling. Cobblestoning present. No oropharyngeal exudate, posterior oropharyngeal edema or posterior oropharyngeal erythema.   Eyes: Conjunctivae and lids are normal. No scleral icterus.   Neck: Trachea normal and phonation normal. Neck supple. No edema present. No erythema present. No neck rigidity present.   Cardiovascular: Normal rate, regular rhythm, normal heart sounds and normal pulses.   Pulmonary/Chest: Effort normal and breath sounds normal. No stridor. No respiratory distress. She has no decreased breath sounds. She has no wheezes. She has no rhonchi. She has no rales. She exhibits tenderness.       Abdominal: Normal appearance.   Musculoskeletal: Normal range of motion.         General: No deformity. Normal range of motion.   Neurological: She is alert and oriented to person, place, and time. She exhibits normal muscle tone. Coordination normal.   Skin: Skin is warm, dry, intact, not diaphoretic and not pale.   Psychiatric: Her speech is normal and behavior is normal. Judgment and thought content normal.   Nursing note and vitals reviewed.      Results for orders placed or performed in visit on 01/02/23   SARS Coronavirus 2 Antigen, POCT Manual Read   Result Value Ref Range    SARS Coronavirus 2 Antigen Negative Negative    Quality  Control Acceptable Yes      X-Ray Ribs 2 View Right    Result Date: 1/2/2023  EXAMINATION: XR RIBS 2 VIEW RIGHT CLINICAL HISTORY: Pleurodynia TECHNIQUE: Two views of the right ribs were performed. COMPARISON: Plain film from 11/13/2014 FINDINGS: Fractures of the right 8th and 9th ribs without significant displacement.  Remaining osseous structures are intact.  No pneumothorax.  No soft tissue abnormality.     Nondisplaced fractures of the right 9th and 8th ribs. Electronically signed by: Alexis Miguel MD Date:    01/02/2023 Time:    15:56     Assessment:       1. Cough, unspecified type    2. Rib pain on right side    3. Fall, initial encounter          Plan:       Labs and Xrays ordered at this visit reviewed.       Cough, unspecified type  -     SARS Coronavirus 2 Antigen, POCT Manual Read    Rib pain on right side  -     X-Ray Ribs 2 View Right; Future; Expected date: 01/02/2023    Fall, initial encounter

## 2023-01-11 ENCOUNTER — TELEPHONE (OUTPATIENT)
Dept: PRIMARY CARE CLINIC | Facility: CLINIC | Age: 73
End: 2023-01-11
Payer: COMMERCIAL

## 2023-01-11 NOTE — TELEPHONE ENCOUNTER
----- Message from Juhi Connell sent at 1/11/2023  2:03 PM CST -----  Contact: [phone] pt  Pt went to  on 01/02 and was diagnosed w/ upper respiratory infection. Pt states she is still not feeling well and she is still very congested and her sinus are acting up. Pt would like to be advised on what she can do. Pt is using   [id number] [place] [facility] [address]  [address]  [place] [address]  Phone: [phone] Fax: [phone]. Please call today in regards to this matter.               Thank you

## 2023-01-11 NOTE — PROGRESS NOTES
[facility] Note    Chief Complaint      Chief Complaint   Patient presents with    Sinus Problem    Cough    Sinusitis    Neck Pain    Rhinitis    Nasal Congestion       History of Present Illness      Yuliana Soares is a 72 y.o. female who presents today for   Chief Complaint   Patient presents with    Sinus Problem    Cough    Sinusitis    Neck Pain    Rhinitis    Nasal Congestion         HPI   Ms. Hyde is a very pleasant 71 y/o female new to me and an established patient of Dr. Wolf. She presents with nasal congestion, cough, rhinitis. Symptoms stared 1 month ago. She has tried treatment with Tessalon pearls, Nyquil, Dayquil, with no resolution of symptoms. She also reports having neck pain, feeling as if she has a pinched nerve in her neck, for the past 6 months. She has not tried treatment for this. She denies any trauma to her neck. She denies any SOB, chest pain, N/V, unintentional weight loss, loss of appetite, fatigue, diarrhea, constipation. She is active daily and remains independent with ADL's.     Patient notified of negative results from COVID and influenza rapid tests.  Review of Systems   Constitutional: Negative.    HENT:  Positive for congestion and sinus pain.    Eyes: Negative.    Respiratory:  Positive for cough.    Cardiovascular: Negative.    Gastrointestinal: Negative.    Genitourinary: Negative.    Musculoskeletal: Negative.    Skin: Negative.    Neurological: Negative.    Endo/Heme/Allergies:  Positive for environmental allergies.   Psychiatric/Behavioral: Negative.        Family History:  family history includes Alcohol abuse in her sister and sister; COPD in her mother; Cancer in her father and paternal grandmother; Cirrhosis in her brother; Crohn's disease in her mother; Hyperlipidemia in her father; No Known Problems in her maternal grandfather, maternal grandmother, paternal grandfather, and sister.   Family history was reviewed with patient.      Medications:  Outpatient Encounter Medications as of 1/13/2023   Medication Sig Dispense Refill    atorvastatin (LIPITOR) 10 MG tablet TAKE 1 TABLET(10 MG) BY MOUTH EVERY DAY 90 tablet 3    benzonatate (TESSALON PERLES) 100 MG capsule Take 1 capsule (100 mg total) by mouth every 6 (six) hours as needed for Cough. 30 capsule 0    hydroCHLOROthiazide (MICROZIDE) 12.5 mg capsule TAKE 1 CAPSULE BY MOUTH EVERY DAY 90 capsule 2    montelukast (SINGULAIR) 10 mg tablet Take 1 tablet (10 mg total) by mouth once daily. 90 tablet 3    olopatadine (PATADAY) 0.2 % Drop INSTILL 1 DROP IN BOTH EYES EVERY DAY 5 mL 12    risedronate (ACTONEL) 150 MG Tab 1 po q mo 3 tablet 3    promethazine-dextromethorphan (PROMETHAZINE-DM) 6.25-15 mg/5 mL Syrp Take 5 mLs by mouth every 4 (four) hours as needed (cough). 120 mL 0     No facility-administered encounter medications on file as of 1/13/2023.       Allergies:  Review of patient's allergies indicates:   Allergen Reactions    Percodan  [oxycodone hcl-oxycodone-asa]      Other reaction(s): hives       Health Maintenance:  Health Maintenance   Topic Date Due    Mammogram  02/22/2023    DEXA Scan  03/10/2024    TETANUS VACCINE  01/06/2026    Lipid Panel  02/01/2027    Hepatitis C Screening  Completed     Health Maintenance Topics with due status: Not Due       Topic Last Completion Date    TETANUS VACCINE 01/06/2016    Colorectal Cancer Screening 02/17/2017    Lipid Panel 02/01/2022    DEXA Scan 03/10/2022       Physical Exam      Vital Signs  Temp: 97.9 °F (36.6 °C)  Temp src: Oral  Pulse: 76  SpO2: 98 %  BP: (!) 134/90  BP Location: Right arm  Patient Position: Sitting  Pain Score: 0-No pain  Height and Weight  Height: 5' (152.4 cm)  Weight: 52.1 kg (114 lb 13.8 oz)  BSA (Calculated - sq m): 1.49 sq meters  BMI (Calculated): 22.4  Weight in (lb) to have BMI = 25: 127.7]    Physical Exam  Vitals reviewed.   Constitutional:       Appearance: Normal appearance. She is normal weight.   HENT:       Head: Normocephalic and atraumatic.      Nose: Congestion present.      Mouth/Throat:      Mouth: Mucous membranes are moist.      Pharynx: Oropharynx is clear.   Eyes:      Extraocular Movements: Extraocular movements intact.      Conjunctiva/sclera: Conjunctivae normal.      Pupils: Pupils are equal, round, and reactive to light.   Cardiovascular:      Rate and Rhythm: Normal rate and regular rhythm.      Pulses: Normal pulses.      Heart sounds: Normal heart sounds.   Pulmonary:      Effort: Pulmonary effort is normal.      Breath sounds: Normal breath sounds.   Musculoskeletal:         General: Normal range of motion.      Cervical back: Normal range of motion and neck supple.   Skin:     General: Skin is warm and dry.      Capillary Refill: Capillary refill takes less than 2 seconds.   Neurological:      General: No focal deficit present.      Mental Status: She is alert and oriented to person, place, and time. Mental status is at baseline.   Psychiatric:         Mood and Affect: Mood normal.         Behavior: Behavior normal.         Thought Content: Thought content normal.         Judgment: Judgment normal.          Assessment/Plan     Yuliana Soares is a 72 y.o.female with:    Cough, unspecified type  -     POCT COVID-19 Rapid Screening; Future; Expected date: 01/13/2023  -     POCT Influenza A/B Molecular    Rhinitis, unspecified type  -     POCT COVID-19 Rapid Screening; Future; Expected date: 01/13/2023  -     POCT Influenza A/B Molecular    Nasal congestion  -     POCT COVID-19 Rapid Screening; Future; Expected date: 01/13/2023  -     POCT Influenza A/B Molecular    Neck pain  -     X-Ray Cervical Spine AP And Lateral; Future; Expected date: 01/13/2023  -     Ambulatory referral/consult to Orthopedics; Future; Expected date: 01/20/2023    Routine medical exam  -     CBC Auto Differential; Future; Expected date: 01/13/2023  -     Comprehensive Metabolic Panel; Future; Expected date:  01/13/2023  -     Hemoglobin A1C; Future; Expected date: 01/13/2023  -     Lipid Panel; Future; Expected date: 01/13/2023  -     T4, Free; Future; Expected date: 01/13/2023  -     TSH; Future; Expected date: 01/13/2023    Other orders  -     promethazine-dextromethorphan (PROMETHAZINE-DM) 6.25-15 mg/5 mL Syrp; Take 5 mLs by mouth every 4 (four) hours as needed (cough).  Dispense: 120 mL; Refill: 0        As above, continue current medications and maintain follow up with specialists.  Return to clinic as needed.    I spent 23 minutes on the day of this encounter for preparing, evaluating, examining, treating, and discussing plan of care with this patient.  Greater than 50% of this time was spent face to face with patient.  All questions were answered to patient's satisfaction.           Karen L Spencer, NP-C [facility]

## 2023-01-13 ENCOUNTER — OFFICE VISIT (OUTPATIENT)
Dept: PRIMARY CARE CLINIC | Facility: CLINIC | Age: 73
End: 2023-01-13
Payer: COMMERCIAL

## 2023-01-13 VITALS
DIASTOLIC BLOOD PRESSURE: 90 MMHG | OXYGEN SATURATION: 98 % | WEIGHT: 114.88 LBS | BODY MASS INDEX: 22.55 KG/M2 | HEIGHT: 60 IN | SYSTOLIC BLOOD PRESSURE: 134 MMHG | HEART RATE: 76 BPM | TEMPERATURE: 98 F

## 2023-01-13 DIAGNOSIS — M54.2 NECK PAIN: ICD-10-CM

## 2023-01-13 DIAGNOSIS — Z00.00 ROUTINE MEDICAL EXAM: ICD-10-CM

## 2023-01-13 DIAGNOSIS — J32.9 SINUSITIS, UNSPECIFIED CHRONICITY, UNSPECIFIED LOCATION: ICD-10-CM

## 2023-01-13 DIAGNOSIS — R05.9 COUGH, UNSPECIFIED TYPE: Primary | ICD-10-CM

## 2023-01-13 DIAGNOSIS — J31.0 RHINITIS, UNSPECIFIED TYPE: ICD-10-CM

## 2023-01-13 DIAGNOSIS — R09.81 NASAL CONGESTION: ICD-10-CM

## 2023-01-13 PROCEDURE — 3008F BODY MASS INDEX DOCD: CPT | Mod: CPTII,S$GLB,, | Performed by: NURSE PRACTITIONER

## 2023-01-13 PROCEDURE — 1160F RVW MEDS BY RX/DR IN RCRD: CPT | Mod: CPTII,S$GLB,, | Performed by: NURSE PRACTITIONER

## 2023-01-13 PROCEDURE — 1159F PR MEDICATION LIST DOCUMENTED IN MEDICAL RECORD: ICD-10-PCS | Mod: CPTII,S$GLB,, | Performed by: NURSE PRACTITIONER

## 2023-01-13 PROCEDURE — 99214 PR OFFICE/OUTPT VISIT, EST, LEVL IV, 30-39 MIN: ICD-10-PCS | Mod: S$GLB,,, | Performed by: NURSE PRACTITIONER

## 2023-01-13 PROCEDURE — 3075F SYST BP GE 130 - 139MM HG: CPT | Mod: CPTII,S$GLB,, | Performed by: NURSE PRACTITIONER

## 2023-01-13 PROCEDURE — 1100F PTFALLS ASSESS-DOCD GE2>/YR: CPT | Mod: CPTII,S$GLB,, | Performed by: NURSE PRACTITIONER

## 2023-01-13 PROCEDURE — 1100F PR PT FALLS ASSESS DOC 2+ FALLS/FALL W/INJURY/YR: ICD-10-PCS | Mod: CPTII,S$GLB,, | Performed by: NURSE PRACTITIONER

## 2023-01-13 PROCEDURE — 87635: ICD-10-PCS | Mod: QW,S$GLB,, | Performed by: NURSE PRACTITIONER

## 2023-01-13 PROCEDURE — 3008F PR BODY MASS INDEX (BMI) DOCUMENTED: ICD-10-PCS | Mod: CPTII,S$GLB,, | Performed by: NURSE PRACTITIONER

## 2023-01-13 PROCEDURE — 1160F PR REVIEW ALL MEDS BY PRESCRIBER/CLIN PHARMACIST DOCUMENTED: ICD-10-PCS | Mod: CPTII,S$GLB,, | Performed by: NURSE PRACTITIONER

## 2023-01-13 PROCEDURE — 3288F FALL RISK ASSESSMENT DOCD: CPT | Mod: CPTII,S$GLB,, | Performed by: NURSE PRACTITIONER

## 2023-01-13 PROCEDURE — 1126F PR PAIN SEVERITY QUANTIFIED, NO PAIN PRESENT: ICD-10-PCS | Mod: CPTII,S$GLB,, | Performed by: NURSE PRACTITIONER

## 2023-01-13 PROCEDURE — 99999 PR PBB SHADOW E&M-EST. PATIENT-LVL IV: CPT | Mod: PBBFAC,,, | Performed by: NURSE PRACTITIONER

## 2023-01-13 PROCEDURE — 3080F PR MOST RECENT DIASTOLIC BLOOD PRESSURE >= 90 MM HG: ICD-10-PCS | Mod: CPTII,S$GLB,, | Performed by: NURSE PRACTITIONER

## 2023-01-13 PROCEDURE — 1159F MED LIST DOCD IN RCRD: CPT | Mod: CPTII,S$GLB,, | Performed by: NURSE PRACTITIONER

## 2023-01-13 PROCEDURE — 1126F AMNT PAIN NOTED NONE PRSNT: CPT | Mod: CPTII,S$GLB,, | Performed by: NURSE PRACTITIONER

## 2023-01-13 PROCEDURE — 3288F PR FALLS RISK ASSESSMENT DOCUMENTED: ICD-10-PCS | Mod: CPTII,S$GLB,, | Performed by: NURSE PRACTITIONER

## 2023-01-13 PROCEDURE — 99999 PR PBB SHADOW E&M-EST. PATIENT-LVL IV: ICD-10-PCS | Mod: PBBFAC,,, | Performed by: NURSE PRACTITIONER

## 2023-01-13 PROCEDURE — 87635 SARS-COV-2 COVID-19 AMP PRB: CPT | Mod: QW,S$GLB,, | Performed by: NURSE PRACTITIONER

## 2023-01-13 PROCEDURE — 99214 OFFICE O/P EST MOD 30 MIN: CPT | Mod: S$GLB,,, | Performed by: NURSE PRACTITIONER

## 2023-01-13 PROCEDURE — 3080F DIAST BP >= 90 MM HG: CPT | Mod: CPTII,S$GLB,, | Performed by: NURSE PRACTITIONER

## 2023-01-13 PROCEDURE — 87502 INFLUENZA DNA AMP PROBE: CPT | Mod: QW,S$GLB,, | Performed by: NURSE PRACTITIONER

## 2023-01-13 PROCEDURE — 87502 POCT INFLUENZA A/B MOLECULAR: ICD-10-PCS | Mod: QW,S$GLB,, | Performed by: NURSE PRACTITIONER

## 2023-01-13 PROCEDURE — 3075F PR MOST RECENT SYSTOLIC BLOOD PRESS GE 130-139MM HG: ICD-10-PCS | Mod: CPTII,S$GLB,, | Performed by: NURSE PRACTITIONER

## 2023-01-13 RX ORDER — PREDNISONE 20 MG/1
20 TABLET ORAL DAILY
Qty: 5 TABLET | Refills: 0 | Status: SHIPPED | OUTPATIENT
Start: 2023-01-13 | End: 2023-02-02 | Stop reason: SDUPTHER

## 2023-01-13 RX ORDER — PROMETHAZINE HYDROCHLORIDE AND DEXTROMETHORPHAN HYDROBROMIDE 6.25; 15 MG/5ML; MG/5ML
5 SYRUP ORAL EVERY 4 HOURS PRN
Qty: 120 ML | Refills: 0 | Status: SHIPPED | OUTPATIENT
Start: 2023-01-13 | End: 2023-01-23

## 2023-01-13 RX ORDER — AZITHROMYCIN 250 MG/1
TABLET, FILM COATED ORAL
Qty: 6 TABLET | Refills: 0 | Status: SHIPPED | OUTPATIENT
Start: 2023-01-13 | End: 2023-01-18

## 2023-01-19 NOTE — PROGRESS NOTES
"[facility] Note    Chief Complaint      Chief Complaint   Patient presents with    Sinus Problem       History of Present Illness      Yuliana Soares is a 72 y.o. female who presents today for   Chief Complaint   Patient presents with    Sinus Problem         Ms. Hyde is a 71 y/o patient known to me and an established patient of Dr. Wolf. She presents with report of continued post nasal drip, sinus congestion. She will be seeing orthopedics tomorrow for left shoulder pain. Currently, she is not taking Zyrtec daily and hasn't taken any seasonal allergy medication in "a while". We discussed importance of taking seasonal allergy medication on a daily basis throughout the year. Patient verbalizes understanding.   She denies any SOB, chest pain, N/V, unintentional weight loss, loss of appetite, fatigue, diarrhea, constipation. She is active daily and remains independent with ADL's.     We reviewed and discussed past labs of 01/27/23 and DEXA scan from 03/10/22. All questions answered.       Review of Systems   Constitutional: Negative.    HENT:  Positive for congestion.    Eyes: Negative.    Respiratory: Negative.     Cardiovascular: Negative.    Gastrointestinal: Negative.    Genitourinary: Negative.    Musculoskeletal: Negative.    Skin: Negative.    Neurological: Negative.    Endo/Heme/Allergies: Negative.    Psychiatric/Behavioral: Negative.     All 12 systems otherwise negative.     Family History:  family history includes Alcohol abuse in her sister and sister; COPD in her mother; Cancer in her father and paternal grandmother; Cirrhosis in her brother; Crohn's disease in her mother; Hyperlipidemia in her father; No Known Problems in her maternal grandfather, maternal grandmother, paternal grandfather, and sister.   Family history was reviewed with patient.     Medications:  Outpatient Encounter Medications as of 2/2/2023   Medication Sig Dispense Refill    atorvastatin (LIPITOR) 10 MG " tablet TAKE 1 TABLET(10 MG) BY MOUTH EVERY DAY 90 tablet 3    hydroCHLOROthiazide (MICROZIDE) 12.5 mg capsule TAKE 1 CAPSULE BY MOUTH EVERY DAY 90 capsule 2    olopatadine (PATADAY) 0.2 % Drop INSTILL 1 DROP IN BOTH EYES EVERY DAY 5 mL 12    risedronate (ACTONEL) 150 MG Tab 1 po q mo 3 tablet 3    [] azithromycin (Z-RAYMOND) 250 MG tablet Take 2 tablets (500 mg total) by mouth once daily for 1 day, THEN 1 tablet (250 mg total) once daily for 4 days. 6 tablet 0    cetirizine (ZYRTEC) 10 MG tablet Take 1 tablet (10 mg total) by mouth once daily. 30 tablet 11    [] ipratropium (ATROVENT) 21 mcg (0.03 %) nasal spray 1 spray by Each Nostril route 2 (two) times daily. for 7 days 30 mL 0    predniSONE (DELTASONE) 20 MG tablet Take 1 tablet (20 mg total) by mouth once daily. 5 tablet 0    [] promethazine-dextromethorphan (PROMETHAZINE-DM) 6.25-15 mg/5 mL Syrp Take 5 mLs by mouth every 4 (four) hours as needed (cough). 120 mL 0    [DISCONTINUED] benzonatate (TESSALON PERLES) 100 MG capsule Take 1 capsule (100 mg total) by mouth every 6 (six) hours as needed for Cough. 30 capsule 0    [DISCONTINUED] montelukast (SINGULAIR) 10 mg tablet Take 1 tablet (10 mg total) by mouth once daily. (Patient not taking: Reported on 2023) 90 tablet 3    [DISCONTINUED] predniSONE (DELTASONE) 20 MG tablet Take 1 tablet (20 mg total) by mouth once daily. 5 tablet 0     No facility-administered encounter medications on file as of 2023.       Allergies:  Review of patient's allergies indicates:   Allergen Reactions    Percodan  [oxycodone hcl-oxycodone-asa]      Other reaction(s): hives       Health Maintenance:  Health Maintenance   Topic Date Due    Mammogram  2023    DEXA Scan  03/10/2024    TETANUS VACCINE  2026    Lipid Panel  2028    Hepatitis C Screening  Completed     Health Maintenance Topics with due status: Not Due       Topic Last Completion Date    TETANUS VACCINE 2016    Colorectal  Cancer Screening 02/17/2017    DEXA Scan 03/10/2022    Lipid Panel 01/27/2023       Physical Exam      Vital Signs  Pulse: 74  SpO2: 99 %  BP: 126/84  BP Location: Right arm  Patient Position: Sitting  Pain Score: 0-No pain  Height and Weight  Height: 5' (152.4 cm)  Weight: 52.2 kg (115 lb 1.3 oz)  BSA (Calculated - sq m): 1.49 sq meters  BMI (Calculated): 22.5  Weight in (lb) to have BMI = 25: 127.7]    Physical Exam  Constitutional:       Appearance: Normal appearance. She is normal weight.   HENT:      Head: Normocephalic and atraumatic.      Nose: Congestion present.      Mouth/Throat:      Mouth: Mucous membranes are moist.      Pharynx: Oropharynx is clear.   Eyes:      Extraocular Movements: Extraocular movements intact.      Conjunctiva/sclera: Conjunctivae normal.      Pupils: Pupils are equal, round, and reactive to light.   Cardiovascular:      Rate and Rhythm: Normal rate and regular rhythm.      Pulses: Normal pulses.      Heart sounds: Normal heart sounds.   Pulmonary:      Effort: Pulmonary effort is normal.      Breath sounds: Normal breath sounds.   Musculoskeletal:         General: Normal range of motion.      Cervical back: Normal range of motion and neck supple.   Skin:     General: Skin is warm and dry.      Capillary Refill: Capillary refill takes less than 2 seconds.   Neurological:      General: No focal deficit present.      Mental Status: She is alert and oriented to person, place, and time. Mental status is at baseline.   Psychiatric:         Mood and Affect: Mood normal.         Behavior: Behavior normal.         Thought Content: Thought content normal.         Judgment: Judgment normal.          Assessment/Plan     Yuliana Soares is a 72 y.o.female with:    Seasonal allergies  -     cetirizine (ZYRTEC) 10 MG tablet; Take 1 tablet (10 mg total) by mouth once daily.  Dispense: 30 tablet; Refill: 11    Nasal congestion  -     predniSONE (DELTASONE) 20 MG tablet; Take 1 tablet (20 mg  total) by mouth once daily.  Dispense: 5 tablet; Refill: 0    Other screening mammogram  -     Mammo Digital Screening Bilat; Future; Expected date: 02/02/2023    Osteoporosis, unspecified osteoporosis type, unspecified pathological fracture presence  -     Vitamin D; Future; Expected date: 03/02/2023        As above, continue current medications and maintain follow up with specialists.  Return to clinic as needed.    I spent 25 minutes on the day of this encounter for preparing, evaluating, examining, treating, and discussing plan of care with this patient.  Greater than 50% of this time was spent face to face with patient.  All questions were answered to patient's satisfaction.           Caitlin Granados, NP-C  [facility]

## 2023-01-26 ENCOUNTER — TELEPHONE (OUTPATIENT)
Dept: ORTHOPEDICS | Facility: CLINIC | Age: 73
End: 2023-01-26
Payer: COMMERCIAL

## 2023-01-26 DIAGNOSIS — M50.30 DDD (DEGENERATIVE DISC DISEASE), CERVICAL: Primary | ICD-10-CM

## 2023-01-26 NOTE — TELEPHONE ENCOUNTER
Spoke to patient in regards to rescheduling her appointment on 02/03/2023 from 3:00 pm to 1:00 pm. Patient stated that she can come in for 1:00 pm on that day. Stated thank you to patient. Thanks.

## 2023-01-27 ENCOUNTER — HOSPITAL ENCOUNTER (OUTPATIENT)
Dept: RADIOLOGY | Facility: HOSPITAL | Age: 73
Discharge: HOME | End: 2023-01-27
Attending: REGISTERED NURSE
Payer: COMMERCIAL

## 2023-01-27 DIAGNOSIS — M50.30 DDD (DEGENERATIVE DISC DISEASE), CERVICAL: ICD-10-CM

## 2023-01-27 PROCEDURE — 72050 X-RAY EXAM NECK SPINE 4/5VWS: CPT | Mod: TC,PN

## 2023-01-27 PROCEDURE — 72050 X-RAY EXAM NECK SPINE 4/5VWS: CPT | Mod: 26,,, | Performed by: RADIOLOGY

## 2023-01-27 PROCEDURE — 72050 XR CERVICAL SPINE AP LAT WITH FLEX EXTEN: ICD-10-PCS | Mod: 26,,, | Performed by: RADIOLOGY
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
## 2023-02-02 ENCOUNTER — OFFICE VISIT (OUTPATIENT)
Dept: PRIMARY CARE CLINIC | Facility: CLINIC | Age: 73
End: 2023-02-02
Payer: COMMERCIAL

## 2023-02-02 ENCOUNTER — LAB VISIT (OUTPATIENT)
Dept: LAB | Facility: HOSPITAL | Age: 73
End: 2023-02-02
Attending: NURSE PRACTITIONER
Payer: COMMERCIAL

## 2023-02-02 VITALS
OXYGEN SATURATION: 99 % | DIASTOLIC BLOOD PRESSURE: 84 MMHG | WEIGHT: 115.06 LBS | HEART RATE: 74 BPM | BODY MASS INDEX: 22.59 KG/M2 | HEIGHT: 60 IN | SYSTOLIC BLOOD PRESSURE: 126 MMHG

## 2023-02-02 DIAGNOSIS — R09.81 NASAL CONGESTION: ICD-10-CM

## 2023-02-02 DIAGNOSIS — Z12.31 OTHER SCREENING MAMMOGRAM: ICD-10-CM

## 2023-02-02 DIAGNOSIS — M81.0 OSTEOPOROSIS, UNSPECIFIED OSTEOPOROSIS TYPE, UNSPECIFIED PATHOLOGICAL FRACTURE PRESENCE: ICD-10-CM

## 2023-02-02 DIAGNOSIS — J30.2 SEASONAL ALLERGIES: Primary | ICD-10-CM

## 2023-02-02 DIAGNOSIS — E55.9 VITAMIN D DEFICIENCY: Primary | ICD-10-CM

## 2023-02-02 LAB — 25(OH)D3+25(OH)D2 SERPL-MCNC: 26 NG/ML (ref 30–96)

## 2023-02-02 PROCEDURE — 99999 PR PBB SHADOW E&M-EST. PATIENT-LVL IV: CPT | Mod: PBBFAC,,, | Performed by: NURSE PRACTITIONER

## 2023-02-02 PROCEDURE — 3288F PR FALLS RISK ASSESSMENT DOCUMENTED: ICD-10-PCS | Mod: CPTII,S$GLB,, | Performed by: NURSE PRACTITIONER

## 2023-02-02 PROCEDURE — 1101F PT FALLS ASSESS-DOCD LE1/YR: CPT | Mod: CPTII,S$GLB,, | Performed by: NURSE PRACTITIONER

## 2023-02-02 PROCEDURE — 3044F PR MOST RECENT HEMOGLOBIN A1C LEVEL <7.0%: ICD-10-PCS | Mod: CPTII,S$GLB,, | Performed by: NURSE PRACTITIONER

## 2023-02-02 PROCEDURE — 3008F PR BODY MASS INDEX (BMI) DOCUMENTED: ICD-10-PCS | Mod: CPTII,S$GLB,, | Performed by: NURSE PRACTITIONER

## 2023-02-02 PROCEDURE — 36415 COLL VENOUS BLD VENIPUNCTURE: CPT | Mod: PN | Performed by: NURSE PRACTITIONER

## 2023-02-02 PROCEDURE — 1159F PR MEDICATION LIST DOCUMENTED IN MEDICAL RECORD: ICD-10-PCS | Mod: CPTII,S$GLB,, | Performed by: NURSE PRACTITIONER

## 2023-02-02 PROCEDURE — 82306 VITAMIN D 25 HYDROXY: CPT | Performed by: NURSE PRACTITIONER

## 2023-02-02 PROCEDURE — 1126F AMNT PAIN NOTED NONE PRSNT: CPT | Mod: CPTII,S$GLB,, | Performed by: NURSE PRACTITIONER

## 2023-02-02 PROCEDURE — 3079F DIAST BP 80-89 MM HG: CPT | Mod: CPTII,S$GLB,, | Performed by: NURSE PRACTITIONER

## 2023-02-02 PROCEDURE — 3074F PR MOST RECENT SYSTOLIC BLOOD PRESSURE < 130 MM HG: ICD-10-PCS | Mod: CPTII,S$GLB,, | Performed by: NURSE PRACTITIONER

## 2023-02-02 PROCEDURE — 1101F PR PT FALLS ASSESS DOC 0-1 FALLS W/OUT INJ PAST YR: ICD-10-PCS | Mod: CPTII,S$GLB,, | Performed by: NURSE PRACTITIONER

## 2023-02-02 PROCEDURE — 1159F MED LIST DOCD IN RCRD: CPT | Mod: CPTII,S$GLB,, | Performed by: NURSE PRACTITIONER

## 2023-02-02 PROCEDURE — 3288F FALL RISK ASSESSMENT DOCD: CPT | Mod: CPTII,S$GLB,, | Performed by: NURSE PRACTITIONER

## 2023-02-02 PROCEDURE — 1160F RVW MEDS BY RX/DR IN RCRD: CPT | Mod: CPTII,S$GLB,, | Performed by: NURSE PRACTITIONER

## 2023-02-02 PROCEDURE — 1126F PR PAIN SEVERITY QUANTIFIED, NO PAIN PRESENT: ICD-10-PCS | Mod: CPTII,S$GLB,, | Performed by: NURSE PRACTITIONER

## 2023-02-02 PROCEDURE — 3074F SYST BP LT 130 MM HG: CPT | Mod: CPTII,S$GLB,, | Performed by: NURSE PRACTITIONER

## 2023-02-02 PROCEDURE — 99999 PR PBB SHADOW E&M-EST. PATIENT-LVL IV: ICD-10-PCS | Mod: PBBFAC,,, | Performed by: NURSE PRACTITIONER

## 2023-02-02 PROCEDURE — 3044F HG A1C LEVEL LT 7.0%: CPT | Mod: CPTII,S$GLB,, | Performed by: NURSE PRACTITIONER

## 2023-02-02 PROCEDURE — 3079F PR MOST RECENT DIASTOLIC BLOOD PRESSURE 80-89 MM HG: ICD-10-PCS | Mod: CPTII,S$GLB,, | Performed by: NURSE PRACTITIONER

## 2023-02-02 PROCEDURE — 1160F PR REVIEW ALL MEDS BY PRESCRIBER/CLIN PHARMACIST DOCUMENTED: ICD-10-PCS | Mod: CPTII,S$GLB,, | Performed by: NURSE PRACTITIONER

## 2023-02-02 PROCEDURE — 99214 PR OFFICE/OUTPT VISIT, EST, LEVL IV, 30-39 MIN: ICD-10-PCS | Mod: S$GLB,,, | Performed by: NURSE PRACTITIONER

## 2023-02-02 PROCEDURE — 3008F BODY MASS INDEX DOCD: CPT | Mod: CPTII,S$GLB,, | Performed by: NURSE PRACTITIONER

## 2023-02-02 PROCEDURE — 99214 OFFICE O/P EST MOD 30 MIN: CPT | Mod: S$GLB,,, | Performed by: NURSE PRACTITIONER

## 2023-02-02 RX ORDER — PREDNISONE 20 MG/1
20 TABLET ORAL DAILY
Qty: 5 TABLET | Refills: 0 | Status: SHIPPED | OUTPATIENT
Start: 2023-02-02 | End: 2023-11-01

## 2023-02-02 RX ORDER — CETIRIZINE HYDROCHLORIDE 10 MG/1
10 TABLET ORAL DAILY
Qty: 30 TABLET | Refills: 11 | Status: SHIPPED | OUTPATIENT
Start: 2023-02-02 | End: 2024-01-03 | Stop reason: SDUPTHER

## 2023-02-03 ENCOUNTER — OFFICE VISIT (OUTPATIENT)
Dept: ORTHOPEDICS | Facility: CLINIC | Age: 73
End: 2023-02-03
Payer: COMMERCIAL

## 2023-02-03 VITALS — WEIGHT: 114.5 LBS | BODY MASS INDEX: 22.48 KG/M2 | HEIGHT: 60 IN

## 2023-02-03 DIAGNOSIS — M50.30 DDD (DEGENERATIVE DISC DISEASE), CERVICAL: ICD-10-CM

## 2023-02-03 DIAGNOSIS — M54.2 NECK PAIN: ICD-10-CM

## 2023-02-03 DIAGNOSIS — M54.12 CERVICAL RADICULOPATHY: Primary | ICD-10-CM

## 2023-02-03 PROCEDURE — 3044F PR MOST RECENT HEMOGLOBIN A1C LEVEL <7.0%: ICD-10-PCS | Mod: CPTII,S$GLB,, | Performed by: REGISTERED NURSE

## 2023-02-03 PROCEDURE — 99204 PR OFFICE/OUTPT VISIT, NEW, LEVL IV, 45-59 MIN: ICD-10-PCS | Mod: S$GLB,,, | Performed by: REGISTERED NURSE

## 2023-02-03 PROCEDURE — 99204 OFFICE O/P NEW MOD 45 MIN: CPT | Mod: S$GLB,,, | Performed by: REGISTERED NURSE

## 2023-02-03 PROCEDURE — 3288F FALL RISK ASSESSMENT DOCD: CPT | Mod: CPTII,S$GLB,, | Performed by: REGISTERED NURSE

## 2023-02-03 PROCEDURE — 1159F MED LIST DOCD IN RCRD: CPT | Mod: CPTII,S$GLB,, | Performed by: REGISTERED NURSE

## 2023-02-03 PROCEDURE — 99999 PR PBB SHADOW E&M-EST. PATIENT-LVL IV: CPT | Mod: PBBFAC,,, | Performed by: REGISTERED NURSE

## 2023-02-03 PROCEDURE — 1101F PT FALLS ASSESS-DOCD LE1/YR: CPT | Mod: CPTII,S$GLB,, | Performed by: REGISTERED NURSE

## 2023-02-03 PROCEDURE — 3008F BODY MASS INDEX DOCD: CPT | Mod: CPTII,S$GLB,, | Performed by: REGISTERED NURSE

## 2023-02-03 PROCEDURE — 1159F PR MEDICATION LIST DOCUMENTED IN MEDICAL RECORD: ICD-10-PCS | Mod: CPTII,S$GLB,, | Performed by: REGISTERED NURSE

## 2023-02-03 PROCEDURE — 3044F HG A1C LEVEL LT 7.0%: CPT | Mod: CPTII,S$GLB,, | Performed by: REGISTERED NURSE

## 2023-02-03 PROCEDURE — 3288F PR FALLS RISK ASSESSMENT DOCUMENTED: ICD-10-PCS | Mod: CPTII,S$GLB,, | Performed by: REGISTERED NURSE

## 2023-02-03 PROCEDURE — 3008F PR BODY MASS INDEX (BMI) DOCUMENTED: ICD-10-PCS | Mod: CPTII,S$GLB,, | Performed by: REGISTERED NURSE

## 2023-02-03 PROCEDURE — 1101F PR PT FALLS ASSESS DOC 0-1 FALLS W/OUT INJ PAST YR: ICD-10-PCS | Mod: CPTII,S$GLB,, | Performed by: REGISTERED NURSE

## 2023-02-03 PROCEDURE — 1125F AMNT PAIN NOTED PAIN PRSNT: CPT | Mod: CPTII,S$GLB,, | Performed by: REGISTERED NURSE

## 2023-02-03 PROCEDURE — 99999 PR PBB SHADOW E&M-EST. PATIENT-LVL IV: ICD-10-PCS | Mod: PBBFAC,,, | Performed by: REGISTERED NURSE

## 2023-02-03 PROCEDURE — 1125F PR PAIN SEVERITY QUANTIFIED, PAIN PRESENT: ICD-10-PCS | Mod: CPTII,S$GLB,, | Performed by: REGISTERED NURSE

## 2023-02-03 RX ORDER — GABAPENTIN 100 MG/1
100 CAPSULE ORAL 3 TIMES DAILY
Qty: 90 CAPSULE | Refills: 11 | Status: SHIPPED | OUTPATIENT
Start: 2023-02-03 | End: 2023-11-28 | Stop reason: SDUPTHER

## 2023-02-08 RX ORDER — ERGOCALCIFEROL 1.25 MG/1
50000 CAPSULE ORAL
Qty: 8 CAPSULE | Refills: 0 | Status: SHIPPED | OUTPATIENT
Start: 2023-02-08 | End: 2023-11-01

## 2023-02-22 NOTE — PROGRESS NOTES
Established Patient - Audio Only Telehealth Visit     The patient location is: home  The chief complaint leading to consultation is: MRI results  Visit type: Virtual visit with audio only (telephone)  Total time spent with patient: 15min     The reason for the audio only service rather than synchronous audio and video virtual visit was related to technical difficulties or patient preference/necessity.     Each patient to whom I provide medical services by telemedicine is:  (1) informed of the relationship between the physician and patient and the respective role of any other health care provider with respect to management of the patient; and (2) notified that they may decline to receive medical services by telemedicine and may withdraw from such care at any time. Patient verbally consented to receive this service via voice-only telephone call.    DATE: 3/1/2023  PATIENT: Yuliana Soares    Attending Physician: Jose Ramirez M.D.    HISTORY:  Yuliana Soares is a 72 y.o. female who returns to me today for MRI results.  She was last seen by me 2/3/2023.  Today she is doing well but notes continued neck and left arm pain.    The Patient denies myelopathic symptoms such as handwriting changes or difficulty with buttons/coins/keys. Denies perineal paresthesias, bowel/bladder dysfunction.    PMH/PSH/FamHx/SocHx:  Unchanged from prior visit    ROS:  REVIEW OF SYSTEMS:  Constitution: Negative. Negative for chills, fever and night sweats.   HENT: Negative for congestion and headaches.    Eyes: Negative for blurred vision, left vision loss and right vision loss.   Cardiovascular: Negative for chest pain and syncope.   Respiratory: Negative for cough and shortness of breath.    Endocrine: Negative for polydipsia, polyphagia and polyuria.   Hematologic/Lymphatic: Negative for bleeding problem. Does not bruise/bleed easily.   Skin: Negative for dry skin, itching and rash.   Musculoskeletal: Negative for falls and  muscle weakness.   Gastrointestinal: Negative for abdominal pain and bowel incontinence.   Allergic/Immunologic: Negative for hives and persistent infections.  Genitourinary: Negative for urinary retention/incontinence and nocturia.   Neurological: negative for disturbances in coordination, no myelopathic symptoms such as handwriting changes or difficulty with buttons, coins, keys or small objects. No loss of balance and seizures.   Psychiatric/Behavioral: Negative for depression. The patient does not have insomnia.   Denies myelopathic symptoms, perineal paresthesias, bowel or bladder incontinence    EXAM:  There were no vitals taken for this visit.  Stable.     IMAGING:    Today I personally re-reviewed AP, Lat and Flex/Ex  upright C-spine films that demonstrate C3/C4 anterolisthesis and the C5/C6 retrolisthesis. Reversal of the lordotic curve. Moderate DDD throughout.      Cervical MRI shows moderate stenosis at C5/6 with foraminal narrowing from C2-7.     There is no height or weight on file to calculate BMI.    Hemoglobin A1C   Date Value Ref Range Status   01/27/2023 5.5 4.0 - 5.6 % Final     Comment:     ADA Screening Guidelines:  5.7-6.4%  Consistent with prediabetes  >or=6.5%  Consistent with diabetes    High levels of fetal hemoglobin interfere with the HbA1C  assay. Heterozygous hemoglobin variants (HbS, HgC, etc)do  not significantly interfere with this assay.   However, presence of multiple variants may affect accuracy.           ASSESSMENT/PLAN:    Diagnoses and all orders for this visit:    Cervical radiculopathy  -     Procedure Order to Pain Management; Future      Today we discussed at length all of the different treatment options including anti-inflammatories, acetaminophen, rest, ice, heat, physical therapy including strengthening and stretching exercises, home exercises, ROM, aerobic conditioning, aqua therapy, other modalities including ultrasound, massage, and dry needling, epidural steroid  injections and finally surgical intervention.    Cervical FERNANDO ordered, she may follow up 2 weeks after if her pain persists.      This service was not originating from a related E/M service provided within the previous 7 days nor will  to an E/M service or procedure within the next 24 hours or my soonest available appointment.  Prevailing standard of care was able to be met in this audio-only visit.

## 2023-02-23 ENCOUNTER — CLINICAL SUPPORT (OUTPATIENT)
Dept: REHABILITATION | Facility: HOSPITAL | Age: 73
End: 2023-02-23
Payer: COMMERCIAL

## 2023-02-23 DIAGNOSIS — M53.82 IMPAIRED RANGE OF MOTION OF CERVICAL SPINE: ICD-10-CM

## 2023-02-23 DIAGNOSIS — M50.30 DDD (DEGENERATIVE DISC DISEASE), CERVICAL: ICD-10-CM

## 2023-02-23 DIAGNOSIS — R29.3 POSTURAL IMBALANCE: ICD-10-CM

## 2023-02-23 DIAGNOSIS — R29.898 IMPAIRED FLEXIBILITY OF UPPER EXTREMITY: ICD-10-CM

## 2023-02-23 DIAGNOSIS — M54.12 CERVICAL RADICULOPATHY: ICD-10-CM

## 2023-02-23 DIAGNOSIS — M54.12 CERVICAL RADICULOPATHY AT C5: ICD-10-CM

## 2023-02-23 PROCEDURE — 97110 THERAPEUTIC EXERCISES: CPT | Mod: PO

## 2023-02-23 PROCEDURE — 97161 PT EVAL LOW COMPLEX 20 MIN: CPT | Mod: PO
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
## 2023-02-27 ENCOUNTER — HOSPITAL ENCOUNTER (OUTPATIENT)
Dept: RADIOLOGY | Facility: HOSPITAL | Age: 73
Discharge: HOME | End: 2023-02-27
Attending: REGISTERED NURSE
Payer: COMMERCIAL

## 2023-02-27 DIAGNOSIS — M54.12 CERVICAL RADICULOPATHY: ICD-10-CM

## 2023-02-27 DIAGNOSIS — M50.30 DDD (DEGENERATIVE DISC DISEASE), CERVICAL: ICD-10-CM

## 2023-02-27 PROCEDURE — 72141 MRI NECK SPINE W/O DYE: CPT | Mod: TC

## 2023-02-27 PROCEDURE — 72141 MRI NECK SPINE W/O DYE: CPT | Mod: 26,,, | Performed by: RADIOLOGY

## 2023-02-27 PROCEDURE — 72141 MRI CERVICAL SPINE WITHOUT CONTRAST: ICD-10-PCS | Mod: 26,,, | Performed by: RADIOLOGY

## 2023-03-01 ENCOUNTER — OFFICE VISIT (OUTPATIENT)
Dept: ORTHOPEDICS | Facility: CLINIC | Age: 73
End: 2023-03-01
Payer: COMMERCIAL

## 2023-03-01 ENCOUNTER — PATIENT MESSAGE (OUTPATIENT)
Dept: PAIN MEDICINE | Facility: OTHER | Age: 73
End: 2023-03-01
Payer: COMMERCIAL

## 2023-03-01 DIAGNOSIS — M54.12 CERVICAL RADICULOPATHY: Primary | ICD-10-CM

## 2023-03-01 PROCEDURE — 3044F HG A1C LEVEL LT 7.0%: CPT | Mod: CPTII,95,, | Performed by: REGISTERED NURSE

## 2023-03-01 PROCEDURE — 3044F PR MOST RECENT HEMOGLOBIN A1C LEVEL <7.0%: ICD-10-PCS | Mod: CPTII,95,, | Performed by: REGISTERED NURSE

## 2023-03-01 PROCEDURE — 99213 PR OFFICE/OUTPT VISIT, EST, LEVL III, 20-29 MIN: ICD-10-PCS | Mod: 95,,, | Performed by: REGISTERED NURSE

## 2023-03-01 PROCEDURE — 1160F PR REVIEW ALL MEDS BY PRESCRIBER/CLIN PHARMACIST DOCUMENTED: ICD-10-PCS | Mod: CPTII,95,, | Performed by: REGISTERED NURSE

## 2023-03-01 PROCEDURE — 1160F RVW MEDS BY RX/DR IN RCRD: CPT | Mod: CPTII,95,, | Performed by: REGISTERED NURSE

## 2023-03-01 PROCEDURE — 1159F PR MEDICATION LIST DOCUMENTED IN MEDICAL RECORD: ICD-10-PCS | Mod: CPTII,95,, | Performed by: REGISTERED NURSE

## 2023-03-01 PROCEDURE — 1159F MED LIST DOCD IN RCRD: CPT | Mod: CPTII,95,, | Performed by: REGISTERED NURSE

## 2023-03-01 PROCEDURE — 99213 OFFICE O/P EST LOW 20 MIN: CPT | Mod: 95,,, | Performed by: REGISTERED NURSE

## 2023-03-02 ENCOUNTER — CLINICAL SUPPORT (OUTPATIENT)
Dept: REHABILITATION | Facility: HOSPITAL | Age: 73
End: 2023-03-02
Payer: COMMERCIAL

## 2023-03-02 DIAGNOSIS — M53.82 IMPAIRED RANGE OF MOTION OF CERVICAL SPINE: ICD-10-CM

## 2023-03-02 DIAGNOSIS — M54.12 CERVICAL RADICULOPATHY AT C5: Primary | ICD-10-CM

## 2023-03-02 DIAGNOSIS — R29.898 IMPAIRED FLEXIBILITY OF UPPER EXTREMITY: ICD-10-CM

## 2023-03-02 DIAGNOSIS — R29.3 POSTURAL IMBALANCE: ICD-10-CM

## 2023-03-02 PROCEDURE — 97140 MANUAL THERAPY 1/> REGIONS: CPT | Mod: PO

## 2023-03-02 PROCEDURE — 97110 THERAPEUTIC EXERCISES: CPT | Mod: PO

## 2023-03-02 NOTE — PROGRESS NOTES
"[facility]   Physical Therapy Treatment Note     Name: Yuliana Montero Rodger  Clinic Number: [id number]    Therapy Diagnosis:   Encounter Diagnoses   Name Primary?    Cervical radiculopathy at C5 Yes    Postural imbalance     Impaired range of motion of cervical spine     Impaired flexibility of upper extremity      Physician: MATTHEW Romero NP    Visit Date: 3/2/2023    Physician Orders: PT Eval and Treat   Medical Diagnosis from Referral:   Cervical radiculopathy    DDD (degenerative disc disease), cervical      Evaluation Date: 2/23/2023  Authorization Period Expiration: 12/31/23  Plan of Care Expiration: 5/4/23  Progress Note Due: 3/23/23  Visit # / Visits authorized: 1/ 12  FOTO: please perform at first follow up visit     Precautions: Standard      Time In: 416 PM     Time Out: 4:58 PM  Total Appointment Time (timed & untimed codes): 42 minutes (TE-1, MT-2)    SUBJECTIVE     Pt reports: she received her MRI results, showing arthritis from C2-7. She was ordered for a steroid injection on March 31st. She was also told to take more of the gabapentin. She feels some tingling in the pinky and ring finger, but it is so slight. She mainly feels the tingling in the shoulder (front). She feels pain right here (points to upper trap and levator scap on the left shoulder.   She was compliant with home exercise program.  Response to previous treatment: abhinav kuldip well   Functional change: none as of yet     Pain: <1/10  Location: left side of neck    OBJECTIVE   TTP at scalenes on left but no reproduction of tingling   No reproduction or relief of tingling with first rib mob on L     Treatment     Winston received the treatments listed below:      therapeutic exercises to develop strength, flexibility, and posture for 10 minutes including:  UT stretch 3x30"  LS stretch 3x30"  Supine chin tucks 30x3"    manual therapy techniques: Joint mobilizations and dry needling were applied to the: left neck " "for 32 minutes, including:    Cervical side glides C2-7  Gr III      Palpation to assess for trigger points of left upper trapezius and left levator scapulae muscles.    Dry needling informed consent obtained after thorough explanation of risks and benefits. Signed consent form will be scanned into medical record.  70% isopropyl alcohol wipe down performed to treatment site with single use sterile prep pads.     DN performed with:  0.30x40 J Type Needles in left upper trapezius and left levator scapulae muscles.  Treatment performed with patient prone position.     DN performed to reduce pain and muscle tension, promote blood flow, and improve ROM and function. Pt tolerated tx well without adverse effects. Winston was educated on what to expect following the procedure and she verbalized understanding.             Patient Education and Home Exercises     Home Exercises Provided and Patient Education Provided     Education provided:   - pt educated on all interventions performed today  - dry needling consent and what to expect after dry needling treatment       Written Home Exercises Provided: Patient instructed to cont prior HEP. Exercises were reviewed and Winston was able to demonstrate them prior to the end of the session.  Winston demonstrated good  understanding of the education provided. See EMR under Patient Instructions for exercises provided during therapy sessions    ASSESSMENT     Dry needling performed today to left upper trapezius and left levator scapulae muscles to decrease trigger point activity. Dry needling consent form to be scanned into chart. Pt educated on what to expect after dry needling treatment and verbalizes understanding. No adverse effects noted with dry needling treatment today. MRI results show "Cervical spondylosis, contributing to moderate/ severe spinal canal stenosis at C5-6 and moderate/ severe neural foraminal narrowing C2-3 through C6-7, as above. Advanced C3-4 facet joint " "arthropathy with grade I anterolisthesis, as above." Patient continues with reproduction of pain/tingling mostly with left cervical sidebending.     Continue to progress as abhinav to improve upon symptoms.    Winston Is progressing well towards her goals.   Pt prognosis is Good.     Pt will continue to benefit from skilled outpatient physical therapy to address the deficits listed in the problem list box on initial evaluation, provide pt/family education and to maximize pt's level of independence in the home and community environment.     Pt's spiritual, cultural and educational needs considered and pt agreeable to plan of care and goals.     Anticipated barriers to physical therapy: none anticipated     Goals: PROGRESSING    Short Term Goals: 5  weeks  1. Pt to be independent in HEP to improve independence with symptoms.   2. Pt to report independence in set up of work place ergonomics for improved symptom management.   3. Pt to improve UE and periscapular muscles strength by 1/2 muscle grade to improve scapular stability.   4. Pt to report pain at worst to be </=3/10 in order to improve upon symptom management and quality of life.  5. Pt to report 50 % improvement in tingling of L UE   6. Pt will demonstrate improved sitting posture to decrease pain experienced in neck  7.  Pt will improve cervical AROM >/=5 deg in impaired planes to improve cervical mobility for all tasks.        Long Term Goals: 10 weeks   1. Pt to be independent in HEP progressions to improve independence with symptoms.   2. Pt to require no verbal or tactile from PT for proper scapular retraction in order to improve postural awareness.   3. Pt to improve UE and periscapular muscles strength by 1 muscle grade to improve scapular stability.   4. Pt to report pain at worst to be </=2/10 in order to improve upon symptom management and quality of life.  5. Pt to improve cervical AROM to WFL to improve tolerance to daily activities at home and work. "   6. Pt to show flexibility restrictions to </=min restrictions in left upper trapezius muscle for improved posture and movement.  7. Pt will report no pain with cervical AROM in all planes to promote QOL.  8. Pt to report 75 % improvement in tingling of L UE           PLAN     Continue PT POC    Assess response to dry needling at next visit.    Lauren Reid, PT

## 2023-03-08 NOTE — PROGRESS NOTES
"[facility]   Physical Therapy Treatment Note     Name: Yuliana Soares  Clinic Number: [id number]    Therapy Diagnosis:   Encounter Diagnoses   Name Primary?    Cervical radiculopathy at C5 Yes    Postural imbalance     Impaired range of motion of cervical spine     Impaired flexibility of upper extremity        Physician: MATTHEW Romero NP    Visit Date: 3/9/2023    Physician Orders: PT Eval and Treat   Medical Diagnosis from Referral:   Cervical radiculopathy    DDD (degenerative disc disease), cervical      Evaluation Date: 2/23/2023  Authorization Period Expiration: 12/31/23  Plan of Care Expiration: 5/4/23  Progress Note Due: 3/23/23  Visit # / Visits authorized: 2/ 12  FOTO: please perform at first follow up visit     Precautions: Standard      Time In: 4:18 PM     Time Out: 5:04 PM  Total Appointment Time (timed & untimed codes): 46 minutes (TE-2, MT-2)    SUBJECTIVE     Pt reports: was very sore after the dry needling and did not feel that it helped. She is still feeling the same pain, like a pulled muscle. She is also still feeling the tingling.   She was compliant with home exercise program.  Response to previous treatment: sore after dry needling  Functional change: none as of yet     Pain: 2/10  Location: left side of neck    OBJECTIVE   N/a  Treatment     Winston received the treatments listed below:      therapeutic exercises to develop strength, flexibility, and posture for 23 minutes including:  UT stretch 3x30"  LS stretch 3x30"  Supine chin tucks 30x3"  Seated SCM stretch L 3x30"  Standing rows GTB 3x10 with 3" holds       manual therapy techniques: Joint mobilizations and dry needling were applied to the: left neck for 23 minutes, including:    Machine cervical traction 20lbs x10 mins   Cervical side glides GrII-III   First rib mob  STM/ MFR to scalenes       Patient Education and Home Exercises     Home Exercises Provided and Patient Education Provided "     Education provided:   - pt educated on all interventions performed today      Written Home Exercises Provided: Patient instructed to cont prior HEP. Exercises were reviewed and Winston was able to demonstrate them prior to the end of the session.  Winston demonstrated good  understanding of the education provided. See EMR under Patient Instructions for exercises provided during therapy sessions    ASSESSMENT       Patient is continuing to experience tingling on left side of neck  and near left clavicle. Initiated machine cervical traction today and patient tolerates this well without adverse effects. Also initiated MFR to scalenes and first rib mob on left today as well. Upon completion of session, patient denies tingling. Progress as tolerated to improve upon symptoms.      Winston Is progressing well towards her goals.   Pt prognosis is Good.     Pt will continue to benefit from skilled outpatient physical therapy to address the deficits listed in the problem list box on initial evaluation, provide pt/family education and to maximize pt's level of independence in the home and community environment.     Pt's spiritual, cultural and educational needs considered and pt agreeable to plan of care and goals.     Anticipated barriers to physical therapy: none anticipated     Goals: PROGRESSING    Short Term Goals: 5  weeks  1. Pt to be independent in HEP to improve independence with symptoms. -MET  2. Pt to report independence in set up of work place ergonomics for improved symptom management. -MET  3. Pt to improve UE and periscapular muscles strength by 1/2 muscle grade to improve scapular stability.   4. Pt to report pain at worst to be </=3/10 in order to improve upon symptom management and quality of life.  5. Pt to report 50 % improvement in tingling of L UE   6. Pt will demonstrate improved sitting posture to decrease pain experienced in neck  7.  Pt will improve cervical AROM >/=5 deg in impaired planes to  improve cervical mobility for all tasks.        Long Term Goals: 10 weeks   1. Pt to be independent in HEP progressions to improve independence with symptoms.   2. Pt to require no verbal or tactile from PT for proper scapular retraction in order to improve postural awareness.   3. Pt to improve UE and periscapular muscles strength by 1 muscle grade to improve scapular stability.   4. Pt to report pain at worst to be </=2/10 in order to improve upon symptom management and quality of life.  5. Pt to improve cervical AROM to WFL to improve tolerance to daily activities at home and work.   6. Pt to show flexibility restrictions to </=min restrictions in left upper trapezius muscle for improved posture and movement.  7. Pt will report no pain with cervical AROM in all planes to promote QOL.  8. Pt to report 75 % improvement in tingling of L UE           PLAN     Continue PT FELTON Reid, PT

## 2023-03-09 ENCOUNTER — CLINICAL SUPPORT (OUTPATIENT)
Dept: REHABILITATION | Facility: HOSPITAL | Age: 73
End: 2023-03-09
Payer: COMMERCIAL

## 2023-03-09 DIAGNOSIS — R29.898 IMPAIRED FLEXIBILITY OF UPPER EXTREMITY: ICD-10-CM

## 2023-03-09 DIAGNOSIS — M54.12 CERVICAL RADICULOPATHY AT C5: Primary | ICD-10-CM

## 2023-03-09 DIAGNOSIS — R29.3 POSTURAL IMBALANCE: ICD-10-CM

## 2023-03-09 DIAGNOSIS — M53.82 IMPAIRED RANGE OF MOTION OF CERVICAL SPINE: ICD-10-CM

## 2023-03-09 PROCEDURE — 97110 THERAPEUTIC EXERCISES: CPT | Mod: PO

## 2023-03-09 PROCEDURE — 97140 MANUAL THERAPY 1/> REGIONS: CPT | Mod: PO

## 2023-03-15 NOTE — PROGRESS NOTES
"  [facility]   Physical Therapy Treatment Note     Name: Yuliana Soares  Clinic Number: [id number]    Therapy Diagnosis:   Encounter Diagnoses   Name Primary?    Cervical radiculopathy at C5 Yes    Postural imbalance     Impaired range of motion of cervical spine     Impaired flexibility of upper extremity          Physician: MATTHEW Romero NP    Visit Date: 3/16/2023    Physician Orders: PT Eval and Treat   Medical Diagnosis from Referral:   Cervical radiculopathy    DDD (degenerative disc disease), cervical      Evaluation Date: 2/23/2023  Authorization Period Expiration: 12/31/23  Plan of Care Expiration: 5/4/23  Progress Note Due: 3/23/23  Visit # / Visits authorized: 3/ 12  FOTO: please perform at first follow up visit     Precautions: Standard      Time In: 4:20 PM  Time Out: 5:19 PM  Total Appointment Time (timed & untimed codes): 59 minutes (TE-2, MT-2, NMR-1)    SUBJECTIVE     Pt reports: still feeling the tingling but thinks maybe this has gotten better.   She was compliant with home exercise program.  Response to previous treatment: some pain  Functional change: ongoing, decreasing frequency and intensity of tingling     Pain: 0/10  Location: left side of neck    OBJECTIVE   N/a    Treatment     Winston received the treatments listed below:      therapeutic exercises to develop strength, flexibility, and posture for 23 minutes including:  UT stretch 3x30"  LS stretch 3x30"  Supine chin tucks 30x3"  Seated SCM stretch L 3x30"  Standing rows GTB 3x10 with 3" holds       manual therapy techniques: Joint mobilizations and dry needling were applied to the: left neck for 28 minutes, including:  Machine cervical traction 20lbs x10 mins   First rib mobilization   STM/ MFR to UT   Manual cervical distraction    Neuromuscular re-education: x8 mins  Prone Y 2x10 L -discontinued due to reproduction of tingling  Bent over Y 2x10 L  Prone T 2x10 L       Patient Education and Home " Exercises     Home Exercises Provided and Patient Education Provided     Education provided:   - pt educated on all interventions performed today      Written Home Exercises Provided: Patient instructed to cont prior HEP. Exercises were reviewed and Winston was able to demonstrate them prior to the end of the session.  Winston demonstrated good  understanding of the education provided. See EMR under Patient Instructions for exercises provided during therapy sessions    ASSESSMENT     Patient continues with good response to manual interventions. Prone Y's reproduce patient's tingling. PT performs manual cervical distraction until tingling mostly dissipates. Patient able to perform both T's and Y's in a bent position rather than prone with less reproduction of tingling (only with bent over T's). Otherwise, patient with good response to session. Patient endorses a decrease in frequency and intensity of tingling since start of care which signifies good response to PT POC. Progress as tolerated to improve upon symptoms.     Winston Is progressing well towards her goals.   Pt prognosis is Good.     Pt will continue to benefit from skilled outpatient physical therapy to address the deficits listed in the problem list box on initial evaluation, provide pt/family education and to maximize pt's level of independence in the home and community environment.     Pt's spiritual, cultural and educational needs considered and pt agreeable to plan of care and goals.     Anticipated barriers to physical therapy: none anticipated     Goals:     Short Term Goals: 5  weeks  1. Pt to be independent in HEP to improve independence with symptoms. -MET  2. Pt to report independence in set up of work place ergonomics for improved symptom management. -MET  3. Pt to improve UE and periscapular muscles strength by 1/2 muscle grade to improve scapular stability.   4. Pt to report pain at worst to be </=3/10 in order to improve upon symptom management  and quality of life.  5. Pt to report 50 % improvement in tingling of L UE   6. Pt will demonstrate improved sitting posture to decrease pain experienced in neck  7.  Pt will improve cervical AROM >/=5 deg in impaired planes to improve cervical mobility for all tasks.        Long Term Goals: 10 weeks   1. Pt to be independent in HEP progressions to improve independence with symptoms.   2. Pt to require no verbal or tactile from PT for proper scapular retraction in order to improve postural awareness.   3. Pt to improve UE and periscapular muscles strength by 1 muscle grade to improve scapular stability.   4. Pt to report pain at worst to be </=2/10 in order to improve upon symptom management and quality of life.  5. Pt to improve cervical AROM to WFL to improve tolerance to daily activities at home and work.   6. Pt to show flexibility restrictions to </=min restrictions in left upper trapezius muscle for improved posture and movement.  7. Pt will report no pain with cervical AROM in all planes to promote QOL.  8. Pt to report 75 % improvement in tingling of L UE           PLAN     Continue PT POC    Lauren Reid, PT

## 2023-03-16 ENCOUNTER — CLINICAL SUPPORT (OUTPATIENT)
Dept: REHABILITATION | Facility: HOSPITAL | Age: 73
End: 2023-03-16
Payer: COMMERCIAL

## 2023-03-16 DIAGNOSIS — M54.12 CERVICAL RADICULOPATHY AT C5: Primary | ICD-10-CM

## 2023-03-16 DIAGNOSIS — R29.3 POSTURAL IMBALANCE: ICD-10-CM

## 2023-03-16 DIAGNOSIS — M53.82 IMPAIRED RANGE OF MOTION OF CERVICAL SPINE: ICD-10-CM

## 2023-03-16 DIAGNOSIS — R29.898 IMPAIRED FLEXIBILITY OF UPPER EXTREMITY: ICD-10-CM

## 2023-03-16 PROCEDURE — 97140 MANUAL THERAPY 1/> REGIONS: CPT | Mod: PO

## 2023-03-16 PROCEDURE — 97112 NEUROMUSCULAR REEDUCATION: CPT | Mod: PO

## 2023-03-16 PROCEDURE — 97110 THERAPEUTIC EXERCISES: CPT | Mod: PO

## 2023-03-22 NOTE — PROGRESS NOTES
"  [facility]   Physical Therapy Treatment Note     Name: Yuliana Soares  Clinic Number: [id number]    Therapy Diagnosis:   Encounter Diagnoses   Name Primary?    Cervical radiculopathy at C5 Yes    Postural imbalance     Impaired range of motion of cervical spine     Impaired flexibility of upper extremity            Physician: MATTHEW Romero NP    Visit Date: 3/23/2023    Physician Orders: PT Eval and Treat   Medical Diagnosis from Referral:   Cervical radiculopathy    DDD (degenerative disc disease), cervical      Evaluation Date: 2/23/2023  Authorization Period Expiration: 12/31/23  Plan of Care Expiration: 5/4/23  Progress Note Due: 3/23/23  Visit # / Visits authorized: 4/ 12  FOTO: NP     Precautions: Standard      Time In: 4:15 PM  Time Out: 5:01 PM  Total Appointment Time (timed & untimed codes): 46 minutes (TE-2, MT-2)    SUBJECTIVE     Pt reports: was sore for 3 days after last visit and had increased tingling. Unsure why. Next Friday she is getting a cortisone injection in the neck.   She was compliant with home exercise program.  Response to previous treatment: increased tingling and soreness   Functional change: ongoing, compared to start of care she thinks the tingling has gotten a little better     Pain: 1/10  Location: left side of neck    OBJECTIVE     N/a    Treatment     Winston received the treatments listed below:      therapeutic exercises to develop strength, flexibility, and posture for 23 minutes including:    UT stretch 3x30"  LS stretch 3x30"  Supine chin tucks 30x3"  Seated SCM stretch L 3x30"  +thoracic extension over full foam roll in supine x20  +open books opening to left x20  Standing rows GTB 3x10 with 3" holds - not performed       manual therapy techniques: Joint mobilizations and dry needling were applied to the: left neck for 23 minutes, including:    Machine cervical traction 20lbs x8 mins   First rib mobilization - d/c due to pain  STM/ MFR to " UT / supraspinatus / scalenes   Manual cervical distraction  Thoracic PA kelsi- d/c due to discomfort     Neuromuscular re-education: x00 mins- not performed   Prone Y 2x10 L -discontinued due to reproduction of tingling  Bent over Y 2x10 L  Prone T 2x10 L       Patient Education and Home Exercises     Home Exercises Provided and Patient Education Provided     Education provided:   - pt educated on all interventions performed today      Written Home Exercises Provided: Patient instructed to cont prior HEP. Exercises were reviewed and Winston was able to demonstrate them prior to the end of the session.  Winston demonstrated good  understanding of the education provided. See EMR under Patient Instructions for exercises provided during therapy sessions    ASSESSMENT     Pt reports that after last visit she had increase in tingling and pain. Patient with tingling in the same region and increased soreness along the left side of the neck. After performing seated stretches, patient endorses an increase in tingling and physical therapist performs supine manual cervical distraction. Patient states this feels good and alleviates her symptoms. Then, we performed machine cervical distraction to assess for further improvements. However, after 8 minutes, patient complains of tingling along the left shoulder. Machine cervical traction is then discontinued. Physical therapist then attempt to alleviate tingling with STM/MFR to upper trap, scalenes, and supraspinatus as this is the area where patient endorses symptoms. However, no change in symptoms provided with manual interventions. At next visit, we will reassess for improvements and/or s/sx of neural tension related to her symptoms; and adjust PT POC accordingly.     Winston Is progressing well towards her goals.   Pt prognosis is Good.     Pt will continue to benefit from skilled outpatient physical therapy to address the deficits listed in the problem list box on initial  evaluation, provide pt/family education and to maximize pt's level of independence in the home and community environment.     Pt's spiritual, cultural and educational needs considered and pt agreeable to plan of care and goals.     Anticipated barriers to physical therapy: none anticipated     Goals:     Short Term Goals: 5  weeks  1. Pt to be independent in HEP to improve independence with symptoms. -MET  2. Pt to report independence in set up of work place ergonomics for improved symptom management. -MET  3. Pt to improve UE and periscapular muscles strength by 1/2 muscle grade to improve scapular stability.   4. Pt to report pain at worst to be </=3/10 in order to improve upon symptom management and quality of life.  5. Pt to report 50 % improvement in tingling of L UE   6. Pt will demonstrate improved sitting posture to decrease pain experienced in neck  7.  Pt will improve cervical AROM >/=5 deg in impaired planes to improve cervical mobility for all tasks.        Long Term Goals: 10 weeks   1. Pt to be independent in HEP progressions to improve independence with symptoms.   2. Pt to require no verbal or tactile from PT for proper scapular retraction in order to improve postural awareness.   3. Pt to improve UE and periscapular muscles strength by 1 muscle grade to improve scapular stability.   4. Pt to report pain at worst to be </=2/10 in order to improve upon symptom management and quality of life.  5. Pt to improve cervical AROM to WFL to improve tolerance to daily activities at home and work.   6. Pt to show flexibility restrictions to </=min restrictions in left upper trapezius muscle for improved posture and movement.  7. Pt will report no pain with cervical AROM in all planes to promote QOL.  8. Pt to report 75 % improvement in tingling of L UE           PLAN     Continue PT POC; Reassess next visit. Assess for s/sx of neural tension     Lauren Reid, PT

## 2023-03-23 ENCOUNTER — CLINICAL SUPPORT (OUTPATIENT)
Dept: REHABILITATION | Facility: HOSPITAL | Age: 73
End: 2023-03-23
Payer: COMMERCIAL

## 2023-03-23 DIAGNOSIS — R29.898 IMPAIRED FLEXIBILITY OF UPPER EXTREMITY: ICD-10-CM

## 2023-03-23 DIAGNOSIS — R29.3 POSTURAL IMBALANCE: ICD-10-CM

## 2023-03-23 DIAGNOSIS — M53.82 IMPAIRED RANGE OF MOTION OF CERVICAL SPINE: ICD-10-CM

## 2023-03-23 DIAGNOSIS — M54.12 CERVICAL RADICULOPATHY AT C5: Primary | ICD-10-CM

## 2023-03-23 PROCEDURE — 97140 MANUAL THERAPY 1/> REGIONS: CPT | Mod: PO

## 2023-03-23 PROCEDURE — 97110 THERAPEUTIC EXERCISES: CPT | Mod: PO

## 2023-03-24 ENCOUNTER — HOSPITAL ENCOUNTER (OUTPATIENT)
Dept: RADIOLOGY | Facility: HOSPITAL | Age: 73
Discharge: HOME | End: 2023-03-24
Attending: NURSE PRACTITIONER
Payer: COMMERCIAL

## 2023-03-24 DIAGNOSIS — Z12.31 OTHER SCREENING MAMMOGRAM: ICD-10-CM

## 2023-03-24 PROCEDURE — 77063 MAMMO DIGITAL SCREENING BILAT WITH TOMO: ICD-10-PCS | Mod: 26,,, | Performed by: RADIOLOGY

## 2023-03-24 PROCEDURE — 77063 BREAST TOMOSYNTHESIS BI: CPT | Mod: 26,,, | Performed by: RADIOLOGY

## 2023-03-24 PROCEDURE — 77067 SCR MAMMO BI INCL CAD: CPT | Mod: 26,,, | Performed by: RADIOLOGY

## 2023-03-24 PROCEDURE — 77067 MAMMO DIGITAL SCREENING BILAT WITH TOMO: ICD-10-PCS | Mod: 26,,, | Performed by: RADIOLOGY

## 2023-03-24 PROCEDURE — 77067 SCR MAMMO BI INCL CAD: CPT | Mod: TC,PO

## 2023-03-27 ENCOUNTER — TELEPHONE (OUTPATIENT)
Dept: ADMINISTRATIVE | Facility: OTHER | Age: 73
End: 2023-03-27
Payer: COMMERCIAL

## 2023-03-27 ENCOUNTER — OFFICE VISIT (OUTPATIENT)
Dept: PRIMARY CARE CLINIC | Facility: CLINIC | Age: 73
End: 2023-03-27
Payer: COMMERCIAL

## 2023-03-27 DIAGNOSIS — R09.81 SINUS CONGESTION: Primary | ICD-10-CM

## 2023-03-27 PROCEDURE — 1159F MED LIST DOCD IN RCRD: CPT | Mod: CPTII,95,, | Performed by: NURSE PRACTITIONER

## 2023-03-27 PROCEDURE — 1160F RVW MEDS BY RX/DR IN RCRD: CPT | Mod: CPTII,95,, | Performed by: NURSE PRACTITIONER

## 2023-03-27 PROCEDURE — 1159F PR MEDICATION LIST DOCUMENTED IN MEDICAL RECORD: ICD-10-PCS | Mod: CPTII,95,, | Performed by: NURSE PRACTITIONER

## 2023-03-27 PROCEDURE — 99213 OFFICE O/P EST LOW 20 MIN: CPT | Mod: 95,,, | Performed by: NURSE PRACTITIONER

## 2023-03-27 PROCEDURE — 3044F PR MOST RECENT HEMOGLOBIN A1C LEVEL <7.0%: ICD-10-PCS | Mod: CPTII,95,, | Performed by: NURSE PRACTITIONER

## 2023-03-27 PROCEDURE — 1160F PR REVIEW ALL MEDS BY PRESCRIBER/CLIN PHARMACIST DOCUMENTED: ICD-10-PCS | Mod: CPTII,95,, | Performed by: NURSE PRACTITIONER

## 2023-03-27 PROCEDURE — 3044F HG A1C LEVEL LT 7.0%: CPT | Mod: CPTII,95,, | Performed by: NURSE PRACTITIONER

## 2023-03-27 PROCEDURE — 99213 PR OFFICE/OUTPT VISIT, EST, LEVL III, 20-29 MIN: ICD-10-PCS | Mod: 95,,, | Performed by: NURSE PRACTITIONER

## 2023-03-27 RX ORDER — PREDNISONE 20 MG/1
20 TABLET ORAL DAILY
Qty: 5 TABLET | Refills: 0 | Status: SHIPPED | OUTPATIENT
Start: 2023-03-27 | End: 2023-11-01

## 2023-03-27 NOTE — PROGRESS NOTES
Established Patient - Audio Only Telehealth Visit     The patient location is: home  The chief complaint leading to consultation is: chronic sinus congestion  Visit type: Virtual visit with audio only (telephone)  Total time spent with patient: 9 minutes       The reason for the audio only service rather than synchronous audio and video virtual visit was related to technical difficulties or patient preference/necessity.     Each patient to whom I provide medical services by telemedicine is:  (1) informed of the relationship between the physician and patient and the respective role of any other health care provider with respect to management of the patient; and (2) notified that they may decline to receive medical services by telemedicine and may withdraw from such care at any time. Patient verbally consented to receive this service via voice-only telephone call.       HPI: Chronic sinus congestion. 3rd episode in 2 months. Prednisone helps but sinus congestion returns. She denies any SOB, chest pain, N/V, unintentional weight loss, loss of appetite, fatigue, diarrhea, constipation. She is active daily and remains independent with ADL's.      Assessment and plan:    - Prednisone 20 mg daily x 5 days  - Refer to ENT                        This service was not originating from a related E/M service provided within the previous 7 days nor will  to an E/M service or procedure within the next 24 hours or my soonest available appointment.  Prevailing standard of care was able to be met in this audio-only visit.

## 2023-03-28 ENCOUNTER — PATIENT MESSAGE (OUTPATIENT)
Dept: PAIN MEDICINE | Facility: OTHER | Age: 73
End: 2023-03-28
Payer: COMMERCIAL

## 2023-03-29 ENCOUNTER — HOSPITAL ENCOUNTER (OUTPATIENT)
Facility: OTHER | Age: 73
Discharge: HOME | End: 2023-03-29
Attending: ANESTHESIOLOGY | Admitting: ANESTHESIOLOGY
Payer: COMMERCIAL

## 2023-03-29 VITALS
TEMPERATURE: 98 F | HEIGHT: 60 IN | DIASTOLIC BLOOD PRESSURE: 93 MMHG | RESPIRATION RATE: 18 BRPM | SYSTOLIC BLOOD PRESSURE: 155 MMHG | WEIGHT: 114 LBS | OXYGEN SATURATION: 98 % | HEART RATE: 66 BPM | BODY MASS INDEX: 22.38 KG/M2

## 2023-03-29 DIAGNOSIS — M54.12 CERVICAL RADICULOPATHY AT C5: ICD-10-CM

## 2023-03-29 DIAGNOSIS — G89.29 CHRONIC PAIN: ICD-10-CM

## 2023-03-29 DIAGNOSIS — M47.22 OSTEOARTHRITIS OF SPINE WITH RADICULOPATHY, CERVICAL REGION: Primary | ICD-10-CM

## 2023-03-29 DIAGNOSIS — M47.22 CERVICAL SPONDYLOSIS WITH RADICULOPATHY: ICD-10-CM

## 2023-03-29 PROCEDURE — 63600175 PHARM REV CODE 636 W HCPCS: Performed by: ANESTHESIOLOGY

## 2023-03-29 PROCEDURE — 62321 PR INJ CERV/THORAC, W/GUIDANCE: ICD-10-PCS | Mod: ,,, | Performed by: ANESTHESIOLOGY

## 2023-03-29 PROCEDURE — 25000003 PHARM REV CODE 250: Performed by: STUDENT IN AN ORGANIZED HEALTH CARE EDUCATION/TRAINING PROGRAM

## 2023-03-29 PROCEDURE — 62321 NJX INTERLAMINAR CRV/THRC: CPT | Mod: ,,, | Performed by: ANESTHESIOLOGY

## 2023-03-29 PROCEDURE — 25000003 PHARM REV CODE 250: Performed by: ANESTHESIOLOGY

## 2023-03-29 PROCEDURE — 25500020 PHARM REV CODE 255: Performed by: ANESTHESIOLOGY

## 2023-03-29 PROCEDURE — 62321 NJX INTERLAMINAR CRV/THRC: CPT | Performed by: ANESTHESIOLOGY

## 2023-03-29 RX ORDER — LIDOCAINE HYDROCHLORIDE 20 MG/ML
INJECTION, SOLUTION INFILTRATION; PERINEURAL
Status: DISCONTINUED | OUTPATIENT
Start: 2023-03-29 | End: 2023-03-29 | Stop reason: HOSPADM

## 2023-03-29 RX ORDER — MIDAZOLAM HYDROCHLORIDE 1 MG/ML
INJECTION INTRAMUSCULAR; INTRAVENOUS
Status: DISCONTINUED | OUTPATIENT
Start: 2023-03-29 | End: 2023-03-29 | Stop reason: HOSPADM

## 2023-03-29 RX ORDER — LIDOCAINE HYDROCHLORIDE 10 MG/ML
INJECTION, SOLUTION EPIDURAL; INFILTRATION; INTRACAUDAL; PERINEURAL
Status: DISCONTINUED | OUTPATIENT
Start: 2023-03-29 | End: 2023-03-29 | Stop reason: HOSPADM

## 2023-03-29 RX ORDER — SODIUM CHLORIDE 9 MG/ML
500 INJECTION, SOLUTION INTRAVENOUS CONTINUOUS
Status: DISCONTINUED | OUTPATIENT
Start: 2023-03-29 | End: 2023-03-29 | Stop reason: HOSPADM

## 2023-03-29 RX ORDER — DEXAMETHASONE SODIUM PHOSPHATE 10 MG/ML
INJECTION INTRAMUSCULAR; INTRAVENOUS
Status: DISCONTINUED | OUTPATIENT
Start: 2023-03-29 | End: 2023-03-29 | Stop reason: HOSPADM

## 2023-03-29 NOTE — OP NOTE
Cervical Interlaminar Epidural Steroid Injection under Fluoroscopic Guidance    The procedure, risks, benefits, and options were discussed with the patient. There are no contraindications to the procedure. The patent expressed understanding and agreed to the procedure. Informed written consent was obtained prior to the start of the procedure and can be found in the patient's chart.     PATIENT NAME: Yuliana Soares   MRN: [id number]     DATE OF PROCEDURE: 03/29/2023    PROCEDURE: Cervical Interlaminar Epidural Steroid Injection C7/T1 under Fluoroscopic Guidance    PRE-OP DIAGNOSIS: Cervical radiculopathy [M54.12] Cervical radiculopathy [M54.12]     POST-OP DIAGNOSIS: Same    PHYSICIAN: Parminder Mcallister MD     ASSISTANTS: Maldonado Salguero MD [employer] [facility] resident     MEDICATIONS INJECTED: Preservative-free Decadron 10mg with 1cc of Lidocaine 1% MPF and preservative free normal saline.    LOCAL ANESTHETIC INJECTED: Xylocaine 2%     SEDATION: Versed 2mg and Fentanyl 0mcg                                                                                                                                                                                     Conscious sedation ordered by M.D. Patient re-evaluation prior to administration of conscious sedation. No changes noted in patient's status from initial evaluation. The patient's vital signs were monitored by RN and patient remained hemodynamically stable throughout the procedure.    Event Time In   Sedation Start 1204   Sedation End 1207         ESTIMATED BLOOD LOSS: None    COMPLICATIONS: None    TECHNIQUE: Time-out was performed to identify the patient and procedure to be performed. With the patient laying in a prone position, the surgical area was prepped and draped in the usual sterile fashion using ChloraPrep and a fenestrated drape. The level was determined under fluoroscopy guidance. Skin anesthesia was achieved by injecting Lidocaine 2% over the injection site.  The  interlaminar space was then approached with a 18 gauge, 3.5 inch Tuohy needle that was introduced under fluoroscopic guidance with AP, lateral and/or contralateral oblique imaging. Once the Ligamentum flavum was encountered loss of resistance to saline was used to enter the epidural space. With positive loss of resistance and negative aspiration for CSF or Blood, contrast dye  Omnipaque (300mg/mL) was injected to confirm placement and there was no vascular runoff. Then 5 mL of the medication mixture listed above was then injected slowly. Displacement of the radio opaque contrast after injection of the medication confirmed that the medication went into the area of the epidural space. The needles were removed, and bleeding was nil. A sterile dressing was applied. No specimens collected. The patient tolerated the procedure well.       The patient was monitored after the procedure in the recovery area. They were given post-procedure and discharge instructions to follow at home. The patient was discharged in a stable condition.    I reviewed and edited the fellow's note. I conducted my own interview and physical examination. I agree with the findings. I was present and supervising all critical portions of the procedure.    Parminder Mcallister MD
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
## 2023-03-29 NOTE — DISCHARGE SUMMARY
Discharge Note  Short Stay      SUMMARY     Admit Date: 3/29/2023    Attending Physician: Mark Anthony Antunez      Discharge Physician: Mark Anthony Antunez      Discharge Date: 3/29/2023 12:00 PM    Procedure(s) (LRB):  INJECTION, STEROID, EPIDURAL, C7/T1 DIRECT REF (N/A)    Final Diagnosis: Cervical radiculopathy [M54.12]    Disposition: Home or self care    Patient Instructions:   Current Discharge Medication List        CONTINUE these medications which have NOT CHANGED    Details   atorvastatin (LIPITOR) 10 MG tablet TAKE 1 TABLET(10 MG) BY MOUTH EVERY DAY  Qty: 90 tablet, Refills: 3    Associated Diagnoses: Hyperlipidemia, unspecified hyperlipidemia type      cetirizine (ZYRTEC) 10 MG tablet Take 1 tablet (10 mg total) by mouth once daily.  Qty: 30 tablet, Refills: 11    Associated Diagnoses: Seasonal allergies      ergocalciferol (ERGOCALCIFEROL) 50,000 unit Cap Take 1 capsule (50,000 Units total) by mouth every 7 days.  Qty: 8 capsule, Refills: 0    Associated Diagnoses: Vitamin D deficiency      gabapentin (NEURONTIN) 100 MG capsule Take 1 capsule (100 mg total) by mouth 3 (three) times daily.  Qty: 90 capsule, Refills: 11    Associated Diagnoses: Cervical radiculopathy; DDD (degenerative disc disease), cervical      hydroCHLOROthiazide (MICROZIDE) 12.5 mg capsule TAKE 1 CAPSULE BY MOUTH EVERY DAY  Qty: 90 capsule, Refills: 2    Associated Diagnoses: HTN (hypertension), benign      olopatadine (PATADAY) 0.2 % Drop INSTILL 1 DROP IN BOTH EYES EVERY DAY  Qty: 5 mL, Refills: 12      !! predniSONE (DELTASONE) 20 MG tablet Take 1 tablet (20 mg total) by mouth once daily.  Qty: 5 tablet, Refills: 0    Associated Diagnoses: Nasal congestion      !! predniSONE (DELTASONE) 20 MG tablet Take 1 tablet (20 mg total) by mouth once daily.  Qty: 5 tablet, Refills: 0    Associated Diagnoses: Sinus congestion      risedronate (ACTONEL) 150 MG Tab TAKE 1 TABLET BY MOUTH EVERY MONTH  Qty: 4 tablet, Refills: 3       !! -  Potential duplicate medications found. Please discuss with provider.              Discharge Diagnosis: Cervical radiculopathy [M54.12]  Condition on Discharge: Stable with no complications to procedure   Diet on Discharge: Same as before.  Activity: as per instruction sheet.  Discharge to: Home with a responsible adult.  Follow up: 2-4 weeks       Please call my office or pager at [phone] if experienced any weakness or loss of sensation, fever > 101.5, pain uncontrolled with oral medications, persistent nausea/vomiting/or diarrhea, redness or drainage from the incisions, or any other worrisome concerns. If physician on call was not reached or could not communicate with our office for any reason please go to the nearest emergency department

## 2023-03-29 NOTE — DISCHARGE INSTRUCTIONS

## 2023-04-29 DIAGNOSIS — E78.5 HYPERLIPIDEMIA, UNSPECIFIED HYPERLIPIDEMIA TYPE: ICD-10-CM

## 2023-04-29 DIAGNOSIS — I10 HTN (HYPERTENSION), BENIGN: ICD-10-CM

## 2023-04-29 NOTE — TELEPHONE ENCOUNTER
Care Due:                  Date            Visit Type   Department     Provider  --------------------------------------------------------------------------------                                EP -                              PRIMARY      [facility] PR[facility]  Last Visit: 02-      CARE ([facility])   DENNIS Wolf  Next Visit: None Scheduled  None         None Found                                                            Last  Test          Frequency    Reason                     Performed    Due Date  --------------------------------------------------------------------------------    Office Visit  12 months..  atorvastatin,              02- 02-                             hydroCHLOROthiazide......    [employer] Embedded Care Due Messages. Reference number: [id number].   4/29/2023 7:44:31 AM CDT

## 2023-04-30 RX ORDER — ATORVASTATIN CALCIUM 10 MG/1
TABLET, FILM COATED ORAL
Qty: 90 TABLET | Refills: 0 | Status: SHIPPED | OUTPATIENT
Start: 2023-04-30 | End: 2023-08-01

## 2023-05-01 RX ORDER — HYDROCHLOROTHIAZIDE 12.5 MG/1
CAPSULE ORAL
Qty: 90 CAPSULE | Refills: 0 | Status: SHIPPED | OUTPATIENT
Start: 2023-05-01 | End: 2023-08-01

## 2023-05-05 ENCOUNTER — TELEPHONE (OUTPATIENT)
Dept: PRIMARY CARE CLINIC | Facility: CLINIC | Age: 73
End: 2023-05-05
Payer: COMMERCIAL

## 2023-05-05 NOTE — TELEPHONE ENCOUNTER
You wanted her to schedule a annual exam with fasting labs prior. The office visit she had with SOSA Granados in Feb was her Annual. She had all labs drawn in Jan. She don't think her insurance will pay for annual and labs this year. I scheduled a f/u visit in Nov. Did you need any labs or anything before that?

## 2023-05-08 NOTE — TELEPHONE ENCOUNTER
LVM           last Blood pressure was high. Please have her send her  home BP readings (normal is < 139/89) so we can document it in her chart.

## 2023-06-05 ENCOUNTER — OFFICE VISIT (OUTPATIENT)
Dept: OTOLARYNGOLOGY | Facility: CLINIC | Age: 73
End: 2023-06-05
Payer: COMMERCIAL

## 2023-06-05 VITALS
DIASTOLIC BLOOD PRESSURE: 84 MMHG | BODY MASS INDEX: 22.32 KG/M2 | SYSTOLIC BLOOD PRESSURE: 140 MMHG | WEIGHT: 114.31 LBS

## 2023-06-05 DIAGNOSIS — J34.2 NASAL SEPTAL DEVIATION: ICD-10-CM

## 2023-06-05 DIAGNOSIS — H69.93 DYSFUNCTION OF BOTH EUSTACHIAN TUBES: ICD-10-CM

## 2023-06-05 DIAGNOSIS — R09.81 SINUS CONGESTION: ICD-10-CM

## 2023-06-05 DIAGNOSIS — H04.123 DRY EYES, BILATERAL: ICD-10-CM

## 2023-06-05 DIAGNOSIS — J30.9 ALLERGIC RHINITIS, UNSPECIFIED SEASONALITY, UNSPECIFIED TRIGGER: Primary | ICD-10-CM

## 2023-06-05 PROCEDURE — 1159F PR MEDICATION LIST DOCUMENTED IN MEDICAL RECORD: ICD-10-PCS | Mod: CPTII,S$GLB,, | Performed by: SPECIALIST

## 2023-06-05 PROCEDURE — 99214 PR OFFICE/OUTPT VISIT, EST, LEVL IV, 30-39 MIN: ICD-10-PCS | Mod: S$GLB,,, | Performed by: SPECIALIST

## 2023-06-05 PROCEDURE — 1160F PR REVIEW ALL MEDS BY PRESCRIBER/CLIN PHARMACIST DOCUMENTED: ICD-10-PCS | Mod: CPTII,S$GLB,, | Performed by: SPECIALIST

## 2023-06-05 PROCEDURE — 3077F SYST BP >= 140 MM HG: CPT | Mod: CPTII,S$GLB,, | Performed by: SPECIALIST

## 2023-06-05 PROCEDURE — 3079F DIAST BP 80-89 MM HG: CPT | Mod: CPTII,S$GLB,, | Performed by: SPECIALIST

## 2023-06-05 PROCEDURE — 3008F BODY MASS INDEX DOCD: CPT | Mod: CPTII,S$GLB,, | Performed by: SPECIALIST

## 2023-06-05 PROCEDURE — 3079F PR MOST RECENT DIASTOLIC BLOOD PRESSURE 80-89 MM HG: ICD-10-PCS | Mod: CPTII,S$GLB,, | Performed by: SPECIALIST

## 2023-06-05 PROCEDURE — 99999 PR PBB SHADOW E&M-EST. PATIENT-LVL III: CPT | Mod: PBBFAC,,, | Performed by: SPECIALIST

## 2023-06-05 PROCEDURE — 3077F PR MOST RECENT SYSTOLIC BLOOD PRESSURE >= 140 MM HG: ICD-10-PCS | Mod: CPTII,S$GLB,, | Performed by: SPECIALIST

## 2023-06-05 PROCEDURE — 3008F PR BODY MASS INDEX (BMI) DOCUMENTED: ICD-10-PCS | Mod: CPTII,S$GLB,, | Performed by: SPECIALIST

## 2023-06-05 PROCEDURE — 99999 PR PBB SHADOW E&M-EST. PATIENT-LVL III: ICD-10-PCS | Mod: PBBFAC,,, | Performed by: SPECIALIST

## 2023-06-05 PROCEDURE — 1160F RVW MEDS BY RX/DR IN RCRD: CPT | Mod: CPTII,S$GLB,, | Performed by: SPECIALIST

## 2023-06-05 PROCEDURE — 1159F MED LIST DOCD IN RCRD: CPT | Mod: CPTII,S$GLB,, | Performed by: SPECIALIST

## 2023-06-05 PROCEDURE — 99214 OFFICE O/P EST MOD 30 MIN: CPT | Mod: S$GLB,,, | Performed by: SPECIALIST

## 2023-06-05 PROCEDURE — 3044F HG A1C LEVEL LT 7.0%: CPT | Mod: CPTII,S$GLB,, | Performed by: SPECIALIST

## 2023-06-05 PROCEDURE — 3044F PR MOST RECENT HEMOGLOBIN A1C LEVEL <7.0%: ICD-10-PCS | Mod: CPTII,S$GLB,, | Performed by: SPECIALIST

## 2023-06-05 RX ORDER — AZELASTINE 1 MG/ML
SPRAY, METERED NASAL
Qty: 30 ML | Refills: 11 | Status: SHIPPED | OUTPATIENT
Start: 2023-06-05 | End: 2023-08-03 | Stop reason: SDUPTHER

## 2023-06-05 RX ORDER — FLUTICASONE PROPIONATE 50 MCG
SPRAY, SUSPENSION (ML) NASAL
Qty: 16 G | Refills: 11 | Status: SHIPPED | OUTPATIENT
Start: 2023-06-05

## 2023-06-05 NOTE — PROGRESS NOTES
"Subjective:       Patient ID: Yuliana Soares is a 72 y.o. female.    Chief Complaint: No chief complaint on file.    The patient is referred by Caitlin Granados NP, for evaluation of chronic pressure in the ears.  She always has sensation that she" has water in (her) ears".  The left side is significantly worse than the right.  She has sneezing and chronic watery discharge from her eyes.  She is seen her ophthalmologist who says she has dry eyes.  More than 20 years ago she tried Restasis and did not find it helpful.  She is now using OTC wetting drops.  She does have nasal congestion and blockage in her nose, particularly on the right.  She has been taking Zyrtec on a daily basis for years.  She did try montelukast but found it excessively sedating.  When she would take it at night and left her with a hangover in the morning.  She is also been using fluticasone nasal spray on an intermittent basis.  She finds it marginally helpful.        Review of Systems     Constitutional: Positive for fatigue.  Negative for appetite change, chills, fever and unexpected weight loss.      HENT: Positive for postnasal drip, runny nose, sinus pressure, sore throat and stuffy nose.  Negative for ear discharge, ear infection, ear pain, facial swelling, hearing loss, mouth sores, nosebleeds, ringing in the ears, sinus infection, tonsil infection, dental problems, trouble swallowing and voice change.      Eyes:  Positive for change in eyesight and eye drainage. Negative for eye itching and photophobia.     Respiratory:  Positive for cough. Negative for shortness of breath, sleep apnea, snoring and wheezing.      Cardiovascular:  Negative for chest pain, foot swelling, irregular heartbeat and swollen veins.     Gastrointestinal:  Negative for abdominal pain, acid reflux, constipation, diarrhea, heartburn and vomiting.     Genitourinary: Negative for difficulty urinating, sexual problems and frequent urination.     Musc: Positive " for neck pain. Negative for aching joints, aching muscles and back pain.     Skin: Negative for rash.     Allergy: Positive for seasonal allergies. Negative for food allergies.     Endocrine: Negative for cold intolerance and heat intolerance.      Neurological: Positive for headaches. Negative for dizziness, light-headedness, seizures and tremors.      Hematologic: Negative for bruises/bleeds easily.      Psychiatric: Negative for decreased concentration, depression, nervous/anxious and sleep disturbance.              Objective:      Physical Exam  Vitals and nursing note reviewed.   Constitutional:       General: She is awake.      Appearance: Normal appearance. She is well-developed, well-groomed and normal weight.   HENT:      Head: Normocephalic.      Jaw: There is normal jaw occlusion.      Salivary Glands: Right salivary gland is not diffusely enlarged. Left salivary gland is not diffusely enlarged.      Right Ear: Hearing, ear canal and external ear normal. Tympanic membrane is retracted. Tympanic membrane has decreased mobility.      Left Ear: Hearing, ear canal and external ear normal. Tympanic membrane is retracted. Tympanic membrane has decreased mobility.      Nose: Septal deviation (Septum deviated to the left), mucosal edema (cyanotic, boggy inferior turbinates bilaterally) and rhinorrhea (clear mucus bilaterally) present. No nasal deformity. Rhinorrhea is clear.      Right Turbinates: Enlarged and pale.      Left Turbinates: Enlarged and pale.        Mouth/Throat:      Lips: No lesions.      Mouth: No oral lesions.      Dentition: No gum lesions.      Tongue: No lesions.      Palate: No mass and lesions.      Pharynx: Oropharynx is clear. Uvula midline.   Eyes:      General: Lids are normal.         Right eye: No discharge.         Left eye: No discharge.      Conjunctiva/sclera:      Right eye: Right conjunctiva is injected. No exudate.     Left eye: Left conjunctiva is injected. No exudate.      Pupils: Pupils are equal, round, and reactive to light.   Neck:      Thyroid: No thyroid mass or thyromegaly.      Trachea: Trachea normal. No tracheal deviation.   Cardiovascular:      Rate and Rhythm: Normal rate and regular rhythm.      Pulses: Normal pulses.      Heart sounds: Normal heart sounds.   Pulmonary:      Effort: Pulmonary effort is normal.      Breath sounds: Normal breath sounds. No stridor. No decreased breath sounds, wheezing, rhonchi or rales.   Abdominal:      General: Bowel sounds are normal.      Palpations: Abdomen is soft.      Tenderness: There is no abdominal tenderness.   Musculoskeletal:      Cervical back: Neck supple. No muscular tenderness. Decreased range of motion.   Lymphadenopathy:      Head:      Right side of head: No submental, submandibular, preauricular, posterior auricular or occipital adenopathy.      Left side of head: No submental, submandibular, preauricular, posterior auricular or occipital adenopathy.      Cervical: No cervical adenopathy.   Skin:     General: Skin is warm and dry.      Findings: No petechiae or rash.      Nails: There is no clubbing.   Neurological:      Mental Status: She is alert and oriented to person, place, and time.      Cranial Nerves: No cranial nerve deficit.      Sensory: No sensory deficit.      Gait: Gait normal.   Psychiatric:         Speech: Speech normal.         Behavior: Behavior normal. Behavior is cooperative.         Thought Content: Thought content normal.         Judgment: Judgment normal.       Assessment:       1. Allergic rhinitis, unspecified seasonality, unspecified trigger    2. Sinus congestion    3. Dysfunction of both eustachian tubes    4. Nasal septal deviation    5. Dry eyes, bilateral        Plan:       I will have the patient stop using Zyrtec and switch her to the combination of azelastine and fluticasone sprays twice daily in both nostrils.  If she finds the azelastine causes worsening of the dry eyes she will use  1 spray instead of 2.  In 3 weeks if her symptoms persist I will have her take 1/2 tablet of 10 mg montelukast every night in addition.  I am scheduling her for a comprehensive auditory evaluation and I will recheck her in 6 weeks.

## 2023-07-10 ENCOUNTER — CLINICAL SUPPORT (OUTPATIENT)
Dept: OTOLARYNGOLOGY | Facility: CLINIC | Age: 73
End: 2023-07-10
Payer: COMMERCIAL

## 2023-07-10 DIAGNOSIS — R94.120: ICD-10-CM

## 2023-07-10 DIAGNOSIS — H90.41 SENSORINEURAL HEARING LOSS (SNHL) OF RIGHT EAR WITH UNRESTRICTED HEARING OF LEFT EAR: Primary | ICD-10-CM

## 2023-07-10 DIAGNOSIS — H74.8X2 TYPE A-S TYMPANOGRAM, LEFT: ICD-10-CM

## 2023-07-10 DIAGNOSIS — R29.2 ABNORMAL ACOUSTIC REFLEX: ICD-10-CM

## 2023-07-10 PROCEDURE — 92588 EVOKED AUDITORY TST COMPLETE: ICD-10-PCS | Mod: S$GLB,,, | Performed by: AUDIOLOGIST-HEARING AID FITTER

## 2023-07-10 PROCEDURE — 92550 PR TYMPANOMETRY AND REFLEX THRESHOLD MEASUREMENTS: ICD-10-PCS | Mod: S$GLB,,, | Performed by: AUDIOLOGIST-HEARING AID FITTER

## 2023-07-10 PROCEDURE — 92557 COMPREHENSIVE HEARING TEST: CPT | Mod: S$GLB,,, | Performed by: AUDIOLOGIST-HEARING AID FITTER

## 2023-07-10 PROCEDURE — 92557 PR COMPREHENSIVE HEARING TEST: ICD-10-PCS | Mod: S$GLB,,, | Performed by: AUDIOLOGIST-HEARING AID FITTER

## 2023-07-10 PROCEDURE — 92588 EVOKED AUDITORY TST COMPLETE: CPT | Mod: S$GLB,,, | Performed by: AUDIOLOGIST-HEARING AID FITTER

## 2023-07-10 PROCEDURE — 92550 TYMPANOMETRY & REFLEX THRESH: CPT | Mod: S$GLB,,, | Performed by: AUDIOLOGIST-HEARING AID FITTER

## 2023-07-10 NOTE — Clinical Note
Your patient, Yuliana Soares, was recently seen for an audiogram.  My assessment and recommendations are enclosed.  If you should have any questions or concerns, please contact me at [phone].   Sincerely, Jazmine Michelle, CCC-A Audiologist [facility]

## 2023-07-10 NOTE — PROGRESS NOTES
Jazmine Michelle, CCC-A  Audiologist - [facility] [address] [address]  [email or web address]  [phone]    Patient: Yuliana Soares   MRN: [id number]  [address]   Home Phone [phone]   Work Phone [phone]   Mobile [phone]   : 1950  LUCAS: 7/10/2023      AUDIOLOGICAL EVALUATION      Audiological testing revealed normal hearing sensitivity for the left ear from 250 Hz - 8000 Hz.  Audiological testing revealed normal hearing sensitivity for the right ear from 250 - 2000 Hz sloping to a mild-to-moderate sensorineural hearing loss.  A speech reception threshold was obtained at 10 dB HL for the left ear and at 25 dB HL for the right ear.  Speech discrimination was 100% for the left ear and 100% for the right ear.  Tympanometry testing revealed a Type As tympanogram for the left ear and a Type A tympanogram for the right ear.  Ipsilateral acoustic reflexes were absent from 500 Hz - 4000 Hz, bilaterally.       Distortion product otoacoustic emissions (DPOAEs) were tested from 1500 Hz - 56610 Hz.  DPOAEs were present from 1500 Hz - 9000 Hz for the left ear and were present from 1500 Hz - 5000 Hz for the right ear.  Present DPOAEs are indicative of normal cochlear function to at least the level of the outer hair cells.  DPOAEs were absent from 60063 Hz - 85202 Hz for the left ear and were absent from 6000 Hz - 58280 Hz for the right ear.  Absent DPOAEs are indicative of abnormal cochlear function to at least the level of the outer hair cells.     RECOMMENDATIONS:   It is recommended that Yuliana Soares:  Follow up medically with Dr. Pretty to assess her unilateral hearing loss.  Consider a hearing aid in her Right ear to improve speech understanding, pending medical clearance.  Continue to receive audiological monitoring annually.  Use precaution and/or hearing protection in noisy environments.    If you should have any questions or  concerns regarding the above information, please do not hesitate to contact me at [phone].      _______________________________  Jazmine Michelle, PANFILO-A  Audiologist

## 2023-08-01 DIAGNOSIS — I10 HTN (HYPERTENSION), BENIGN: ICD-10-CM

## 2023-08-01 DIAGNOSIS — E78.5 HYPERLIPIDEMIA, UNSPECIFIED HYPERLIPIDEMIA TYPE: ICD-10-CM

## 2023-08-01 RX ORDER — HYDROCHLOROTHIAZIDE 12.5 MG/1
CAPSULE ORAL
Qty: 90 CAPSULE | Refills: 0 | Status: SHIPPED | OUTPATIENT
Start: 2023-08-01 | End: 2023-10-31

## 2023-08-01 RX ORDER — ATORVASTATIN CALCIUM 10 MG/1
TABLET, FILM COATED ORAL
Qty: 90 TABLET | Refills: 0 | Status: SHIPPED | OUTPATIENT
Start: 2023-08-01 | End: 2023-10-31

## 2023-08-01 NOTE — TELEPHONE ENCOUNTER
Care Due:                  Date            Visit Type   Department     Provider  --------------------------------------------------------------------------------                                EP -                              PRIMARY      [facility] PRIMARY  Last Visit: 02-      CARE ([facility])   DENNIS Wolf                              EP -                              PRIMARY      [facility] PRIMARY  Next Visit: 11-      CARE ([facility])   DENNIS Wolf                                                            Last  Test          Frequency    Reason                     Performed    Due Date  --------------------------------------------------------------------------------    Office Visit  12 months..  atorvastatin,              02- 02-                             hydroCHLOROthiazide......    [employer] Embedded Care Due Messages. Reference number: [id number].   8/01/2023 12:59:09 AM CDT

## 2023-08-03 ENCOUNTER — OFFICE VISIT (OUTPATIENT)
Dept: OTOLARYNGOLOGY | Facility: CLINIC | Age: 73
End: 2023-08-03
Payer: COMMERCIAL

## 2023-08-03 ENCOUNTER — LAB VISIT (OUTPATIENT)
Dept: LAB | Facility: HOSPITAL | Age: 73
End: 2023-08-03
Attending: SPECIALIST
Payer: COMMERCIAL

## 2023-08-03 VITALS
DIASTOLIC BLOOD PRESSURE: 74 MMHG | SYSTOLIC BLOOD PRESSURE: 154 MMHG | OXYGEN SATURATION: 96 % | WEIGHT: 117.94 LBS | HEART RATE: 71 BPM | BODY MASS INDEX: 23.16 KG/M2 | HEIGHT: 60 IN

## 2023-08-03 DIAGNOSIS — Z13.9 SCREENING FOR CONDITION: ICD-10-CM

## 2023-08-03 DIAGNOSIS — H90.A21 SENSORINEURAL HEARING LOSS (SNHL) OF RIGHT EAR WITH RESTRICTED HEARING OF LEFT EAR: ICD-10-CM

## 2023-08-03 DIAGNOSIS — J34.2 NASAL SEPTAL DEVIATION: ICD-10-CM

## 2023-08-03 DIAGNOSIS — R94.120: ICD-10-CM

## 2023-08-03 DIAGNOSIS — H69.93 DYSFUNCTION OF BOTH EUSTACHIAN TUBES: ICD-10-CM

## 2023-08-03 DIAGNOSIS — H90.41 SENSORINEURAL HEARING LOSS (SNHL) OF RIGHT EAR WITH UNRESTRICTED HEARING OF LEFT EAR: Primary | ICD-10-CM

## 2023-08-03 DIAGNOSIS — J30.9 ALLERGIC RHINITIS, UNSPECIFIED SEASONALITY, UNSPECIFIED TRIGGER: ICD-10-CM

## 2023-08-03 LAB
CREAT SERPL-MCNC: 0.7 MG/DL (ref 0.5–1.4)
EST. GFR  (NO RACE VARIABLE): >60 ML/MIN/1.73 M^2

## 2023-08-03 PROCEDURE — 1160F PR REVIEW ALL MEDS BY PRESCRIBER/CLIN PHARMACIST DOCUMENTED: ICD-10-PCS | Mod: CPTII,S$GLB,, | Performed by: SPECIALIST

## 2023-08-03 PROCEDURE — 3077F PR MOST RECENT SYSTOLIC BLOOD PRESSURE >= 140 MM HG: ICD-10-PCS | Mod: CPTII,S$GLB,, | Performed by: SPECIALIST

## 2023-08-03 PROCEDURE — 1126F PR PAIN SEVERITY QUANTIFIED, NO PAIN PRESENT: ICD-10-PCS | Mod: CPTII,S$GLB,, | Performed by: SPECIALIST

## 2023-08-03 PROCEDURE — 1126F AMNT PAIN NOTED NONE PRSNT: CPT | Mod: CPTII,S$GLB,, | Performed by: SPECIALIST

## 2023-08-03 PROCEDURE — 82565 ASSAY OF CREATININE: CPT | Performed by: SPECIALIST

## 2023-08-03 PROCEDURE — 1101F PR PT FALLS ASSESS DOC 0-1 FALLS W/OUT INJ PAST YR: ICD-10-PCS | Mod: CPTII,S$GLB,, | Performed by: SPECIALIST

## 2023-08-03 PROCEDURE — 3288F PR FALLS RISK ASSESSMENT DOCUMENTED: ICD-10-PCS | Mod: CPTII,S$GLB,, | Performed by: SPECIALIST

## 2023-08-03 PROCEDURE — 99999 PR PBB SHADOW E&M-EST. PATIENT-LVL IV: CPT | Mod: PBBFAC,,, | Performed by: SPECIALIST

## 2023-08-03 PROCEDURE — 3078F PR MOST RECENT DIASTOLIC BLOOD PRESSURE < 80 MM HG: ICD-10-PCS | Mod: CPTII,S$GLB,, | Performed by: SPECIALIST

## 2023-08-03 PROCEDURE — 99999 PR PBB SHADOW E&M-EST. PATIENT-LVL IV: ICD-10-PCS | Mod: PBBFAC,,, | Performed by: SPECIALIST

## 2023-08-03 PROCEDURE — 3288F FALL RISK ASSESSMENT DOCD: CPT | Mod: CPTII,S$GLB,, | Performed by: SPECIALIST

## 2023-08-03 PROCEDURE — 3044F PR MOST RECENT HEMOGLOBIN A1C LEVEL <7.0%: ICD-10-PCS | Mod: CPTII,S$GLB,, | Performed by: SPECIALIST

## 2023-08-03 PROCEDURE — 3078F DIAST BP <80 MM HG: CPT | Mod: CPTII,S$GLB,, | Performed by: SPECIALIST

## 2023-08-03 PROCEDURE — 3008F PR BODY MASS INDEX (BMI) DOCUMENTED: ICD-10-PCS | Mod: CPTII,S$GLB,, | Performed by: SPECIALIST

## 2023-08-03 PROCEDURE — 3077F SYST BP >= 140 MM HG: CPT | Mod: CPTII,S$GLB,, | Performed by: SPECIALIST

## 2023-08-03 PROCEDURE — 99213 PR OFFICE/OUTPT VISIT, EST, LEVL III, 20-29 MIN: ICD-10-PCS | Mod: S$GLB,,, | Performed by: SPECIALIST

## 2023-08-03 PROCEDURE — 1159F PR MEDICATION LIST DOCUMENTED IN MEDICAL RECORD: ICD-10-PCS | Mod: CPTII,S$GLB,, | Performed by: SPECIALIST

## 2023-08-03 PROCEDURE — 36415 COLL VENOUS BLD VENIPUNCTURE: CPT | Mod: PN | Performed by: SPECIALIST

## 2023-08-03 PROCEDURE — 99213 OFFICE O/P EST LOW 20 MIN: CPT | Mod: S$GLB,,, | Performed by: SPECIALIST

## 2023-08-03 PROCEDURE — 1159F MED LIST DOCD IN RCRD: CPT | Mod: CPTII,S$GLB,, | Performed by: SPECIALIST

## 2023-08-03 PROCEDURE — 1160F RVW MEDS BY RX/DR IN RCRD: CPT | Mod: CPTII,S$GLB,, | Performed by: SPECIALIST

## 2023-08-03 PROCEDURE — 1101F PT FALLS ASSESS-DOCD LE1/YR: CPT | Mod: CPTII,S$GLB,, | Performed by: SPECIALIST

## 2023-08-03 PROCEDURE — 3008F BODY MASS INDEX DOCD: CPT | Mod: CPTII,S$GLB,, | Performed by: SPECIALIST

## 2023-08-03 PROCEDURE — 3044F HG A1C LEVEL LT 7.0%: CPT | Mod: CPTII,S$GLB,, | Performed by: SPECIALIST

## 2023-08-03 RX ORDER — AZELASTINE 1 MG/ML
SPRAY, METERED NASAL
Qty: 90 ML | Refills: 3 | Status: SHIPPED | OUTPATIENT
Start: 2023-08-03

## 2023-08-03 RX ORDER — AZELASTINE HYDROCHLORIDE 0.5 MG/ML
1 SOLUTION/ DROPS OPHTHALMIC 2 TIMES DAILY
Qty: 6 ML | Refills: 11 | Status: SHIPPED | OUTPATIENT
Start: 2023-08-03 | End: 2024-08-02

## 2023-08-03 NOTE — PROGRESS NOTES
Subjective:       Patient ID: Yuliana Soares is a 73 y.o. female.    Chief Complaint: Follow-up (6 wk)    The patient is  coming in for follow-up visit.  There are multiple issues to discuss:  1. Allergic rhinitis:  The patient has been using azelastine nasal spray in the mornings and fluticasone at night.  She did find that the fluticasone causes her to have headaches.  Nasal secretions are clear.  She did find the azelastine spray helpful.  2. Allergic conjunctivitis:  The patient has olopatadine drops to use for her eyes.  They help some what.  They are problematic in that they cause her eyes to be excessively dry.  3. Dry eyes:  The patient is having itching and dryness in her eyes.  4. Hearing loss:  She is returning to discuss results of her hearing test.  She does not perceive a difference in the hearing in the right and left ears.          Review of Systems     Constitutional: Positive for fatigue.  Negative for appetite change, chills, fever and unexpected weight loss.      HENT: Positive for postnasal drip, runny nose, sinus pressure, sore throat and stuffy nose.  Negative for ear discharge, ear infection, ear pain, facial swelling, hearing loss, mouth sores, nosebleeds, ringing in the ears, sinus infection, tonsil infection, dental problems, trouble swallowing and voice change.      Eyes:  Positive for change in eyesight and eye drainage. Negative for eye itching and photophobia.     Respiratory:  Positive for cough. Negative for shortness of breath, sleep apnea, snoring and wheezing.      Cardiovascular:  Negative for chest pain, foot swelling, irregular heartbeat and swollen veins.     Gastrointestinal:  Negative for abdominal pain, acid reflux, constipation, diarrhea, heartburn and vomiting.     Genitourinary: Negative for difficulty urinating, sexual problems and frequent urination.     Musc: Positive for neck pain. Negative for aching joints, aching muscles and back pain.     Skin: Negative  for rash.     Allergy: Positive for seasonal allergies. Negative for food allergies.     Endocrine: Negative for cold intolerance and heat intolerance.      Neurological: Positive for headaches. Negative for dizziness, light-headedness, seizures and tremors.      Hematologic: Negative for bruises/bleeds easily.      Psychiatric: Negative for decreased concentration, depression, nervous/anxious and sleep disturbance.                Objective:      Physical Exam  Vitals and nursing note reviewed.   Constitutional:       General: She is awake.      Appearance: Normal appearance. She is well-developed, well-groomed and normal weight.   HENT:      Head: Normocephalic.      Jaw: There is normal jaw occlusion.      Salivary Glands: Right salivary gland is not diffusely enlarged. Left salivary gland is not diffusely enlarged.      Right Ear: Hearing, ear canal and external ear normal. Tympanic membrane is retracted. Tympanic membrane has decreased mobility.      Left Ear: Hearing, ear canal and external ear normal. Tympanic membrane is retracted. Tympanic membrane has decreased mobility.      Nose: Septal deviation (Septum deviated to the left), mucosal edema (cyanotic, boggy inferior turbinates bilaterally) and rhinorrhea (clear mucus bilaterally) present. No nasal deformity. Rhinorrhea is clear.      Right Turbinates: Enlarged and pale.      Left Turbinates: Enlarged and pale.        Mouth/Throat:      Lips: No lesions.      Mouth: No oral lesions.      Dentition: No gum lesions.      Tongue: No lesions.      Palate: No mass and lesions.      Pharynx: Oropharynx is clear. Uvula midline.   Eyes:      General: Lids are normal.         Right eye: No discharge.         Left eye: No discharge.      Conjunctiva/sclera:      Right eye: Right conjunctiva is injected. No exudate.     Left eye: Left conjunctiva is injected. No exudate.     Pupils: Pupils are equal, round, and reactive to light.   Neck:      Thyroid: No thyroid mass  or thyromegaly.      Trachea: Trachea normal. No tracheal deviation.   Cardiovascular:      Rate and Rhythm: Normal rate and regular rhythm.      Pulses: Normal pulses.      Heart sounds: Normal heart sounds.   Pulmonary:      Effort: Pulmonary effort is normal.      Breath sounds: Normal breath sounds. No stridor. No decreased breath sounds, wheezing, rhonchi or rales.   Abdominal:      General: Bowel sounds are normal.      Palpations: Abdomen is soft.      Tenderness: There is no abdominal tenderness.   Musculoskeletal:      Cervical back: Neck supple. No muscular tenderness. Decreased range of motion.   Lymphadenopathy:      Head:      Right side of head: No submental, submandibular, preauricular, posterior auricular or occipital adenopathy.      Left side of head: No submental, submandibular, preauricular, posterior auricular or occipital adenopathy.      Cervical: No cervical adenopathy.   Skin:     General: Skin is warm and dry.      Findings: No petechiae or rash.      Nails: There is no clubbing.   Neurological:      Mental Status: She is alert and oriented to person, place, and time.      Cranial Nerves: No cranial nerve deficit.      Sensory: No sensory deficit.      Gait: Gait normal.   Psychiatric:         Speech: Speech normal.         Behavior: Behavior normal. Behavior is cooperative.         Thought Content: Thought content normal.         Judgment: Judgment normal.           I personally reviewed the above audiogram and discussed in full detail with the patient during her visit.  I answered all her questions and provided her with a copy of the audiogram.  Pertinent findings:  Mild to moderate mid and high-frequency sensorineural hearing loss in the right ear with normal hearing levels on the left, normal discrimination on the right at 50 dB and on the left at 65 dB, tympanogram on the left is Type As  and on the right is Type A.      Assessment:       1. Sensorineural hearing loss (SNHL) of right  ear with unrestricted hearing of left ear    2. Abnormal sandip-acoustic emission test    3. Allergic rhinitis, unspecified seasonality, unspecified trigger    4. Dysfunction of both eustachian tubes    5. Nasal septal deviation    6. Screening for condition    7. Sensorineural hearing loss (SNHL) of right ear with restricted hearing of left ear          Plan:       I  will schedule patient for an MRI of the temporal bones with and without IV contrast to evaluate her asymmetrical hearing loss.  She will need to have a serum creatinine level drawn because of the use of IV contrast.  I will telephone report to her unless an in-person discussion is required.  I am starting her on azelastine ophthalmic drops.  If these do not control the itching and tearing in her eye she needs to see her ophthalmologist.  I will recheck the patient in 3 months.
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
## 2023-08-28 ENCOUNTER — HOSPITAL ENCOUNTER (OUTPATIENT)
Dept: RADIOLOGY | Facility: HOSPITAL | Age: 73
Discharge: HOME | End: 2023-08-28
Attending: SPECIALIST
Payer: COMMERCIAL

## 2023-08-28 DIAGNOSIS — H90.A21 SENSORINEURAL HEARING LOSS (SNHL) OF RIGHT EAR WITH RESTRICTED HEARING OF LEFT EAR: ICD-10-CM

## 2023-08-28 PROCEDURE — 70553 MRI BRAIN STEM W/O & W/DYE: CPT | Mod: TC

## 2023-08-28 PROCEDURE — 25500020 PHARM REV CODE 255: Performed by: SPECIALIST

## 2023-08-28 PROCEDURE — 70553 MRI BRAIN STEM W/O & W/DYE: CPT | Mod: 26,,, | Performed by: RADIOLOGY

## 2023-08-28 PROCEDURE — A9585 GADOBUTROL INJECTION: HCPCS | Performed by: SPECIALIST

## 2023-08-28 PROCEDURE — 70553 MRI IAC/TEMPORAL BONES W W/O CONTRAST: ICD-10-PCS | Mod: 26,,, | Performed by: RADIOLOGY

## 2023-08-28 RX ORDER — GADOBUTROL 604.72 MG/ML
6 INJECTION INTRAVENOUS
Status: COMPLETED | OUTPATIENT
Start: 2023-08-28 | End: 2023-08-28

## 2023-08-28 RX ADMIN — GADOBUTROL 6 ML: 604.72 INJECTION INTRAVENOUS at 05:08

## 2023-08-29 ENCOUNTER — TELEPHONE (OUTPATIENT)
Dept: OTOLARYNGOLOGY | Facility: CLINIC | Age: 73
End: 2023-08-29
Payer: COMMERCIAL

## 2023-08-29 NOTE — TELEPHONE ENCOUNTER
----- Message from BASHIR Pretty MD sent at 8/29/2023  9:04 AM CDT -----  Please inform patient that her MRI of the temporal bones results are normal. Please have them follow up as scheduled.

## 2023-08-31 ENCOUNTER — TELEPHONE (OUTPATIENT)
Dept: OTOLARYNGOLOGY | Facility: CLINIC | Age: 73
End: 2023-08-31
Payer: COMMERCIAL

## 2023-08-31 NOTE — TELEPHONE ENCOUNTER
----- Message from Jocelyn Knight RN sent at 8/29/2023 10:31 AM CDT -----    ----- Message -----  From: BASHIR Pretty MD  Sent: 8/29/2023   9:04 AM CDT  To: Sukhw[facility] Tavarez Staff    Please inform patient that her MRI of the temporal bones results are normal. Please have them follow up as scheduled.

## 2023-10-05 ENCOUNTER — PATIENT MESSAGE (OUTPATIENT)
Dept: OTOLARYNGOLOGY | Facility: CLINIC | Age: 73
End: 2023-10-05
Payer: COMMERCIAL

## 2023-10-28 DIAGNOSIS — I10 HTN (HYPERTENSION), BENIGN: ICD-10-CM

## 2023-10-28 DIAGNOSIS — E78.5 HYPERLIPIDEMIA, UNSPECIFIED HYPERLIPIDEMIA TYPE: ICD-10-CM

## 2023-10-28 DIAGNOSIS — Z00.00 ROUTINE GENERAL MEDICAL EXAMINATION AT A HEALTH CARE FACILITY: Primary | ICD-10-CM

## 2023-10-28 NOTE — TELEPHONE ENCOUNTER
Care Due:                  Date            Visit Type   Department     Provider  --------------------------------------------------------------------------------                                EP -                              PRIMARY      [facility]  Last Visit: 02-      CARE ([facility])   DENNIS Wolf                              EP -                              PRIMARY      [facility]  Next Visit: 11-      CARE ([facility])   DENNIS Wolf                                                            Last  Test          Frequency    Reason                     Performed    Due Date  --------------------------------------------------------------------------------    CMP.........  12 months..  atorvastatin,              01- 01-                             hydroCHLOROthiazide,                             risedronate..............    Lipid Panel.  12 months..  atorvastatin.............  01- 01-    Mg Level....  12 months..  risedronate..............  Not Found    Overdue    Phosphate...  12 months..  risedronate..............  Not Found    Overdue    [employer] Embedded Care Due Messages. Reference number: [id number].   10/28/2023 7:32:36 AM CDT

## 2023-10-31 RX ORDER — HYDROCHLOROTHIAZIDE 12.5 MG/1
CAPSULE ORAL
Qty: 90 CAPSULE | Refills: 0 | Status: SHIPPED | OUTPATIENT
Start: 2023-10-31 | End: 2024-01-03 | Stop reason: SDUPTHER

## 2023-10-31 RX ORDER — ATORVASTATIN CALCIUM 10 MG/1
TABLET, FILM COATED ORAL
Qty: 90 TABLET | Refills: 0 | Status: SHIPPED | OUTPATIENT
Start: 2023-10-31 | End: 2024-01-03 | Stop reason: SDUPTHER

## 2023-11-01 ENCOUNTER — TELEPHONE (OUTPATIENT)
Dept: PRIMARY CARE CLINIC | Facility: CLINIC | Age: 73
End: 2023-11-01

## 2023-11-01 ENCOUNTER — LAB VISIT (OUTPATIENT)
Dept: LAB | Facility: HOSPITAL | Age: 73
End: 2023-11-01
Attending: FAMILY MEDICINE
Payer: COMMERCIAL

## 2023-11-01 ENCOUNTER — OFFICE VISIT (OUTPATIENT)
Dept: PRIMARY CARE CLINIC | Facility: CLINIC | Age: 73
End: 2023-11-01
Payer: COMMERCIAL

## 2023-11-01 VITALS
SYSTOLIC BLOOD PRESSURE: 136 MMHG | HEIGHT: 59 IN | HEART RATE: 71 BPM | DIASTOLIC BLOOD PRESSURE: 86 MMHG | WEIGHT: 115.75 LBS | OXYGEN SATURATION: 96 % | TEMPERATURE: 98 F | BODY MASS INDEX: 23.33 KG/M2

## 2023-11-01 DIAGNOSIS — I10 HTN (HYPERTENSION), BENIGN: ICD-10-CM

## 2023-11-01 DIAGNOSIS — M54.12 CERVICAL RADICULOPATHY AT C5: ICD-10-CM

## 2023-11-01 DIAGNOSIS — Z00.00 ROUTINE MEDICAL EXAM: Primary | ICD-10-CM

## 2023-11-01 DIAGNOSIS — M81.0 OSTEOPOROSIS, SENILE: ICD-10-CM

## 2023-11-01 DIAGNOSIS — E78.2 MIXED HYPERLIPIDEMIA: ICD-10-CM

## 2023-11-01 DIAGNOSIS — Z00.00 ROUTINE GENERAL MEDICAL EXAMINATION AT A HEALTH CARE FACILITY: ICD-10-CM

## 2023-11-01 LAB
ALBUMIN SERPL BCP-MCNC: 4.2 G/DL (ref 3.5–5.2)
ALP SERPL-CCNC: 68 U/L (ref 55–135)
ALT SERPL W/O P-5'-P-CCNC: 15 U/L (ref 10–44)
ANION GAP SERPL CALC-SCNC: 11 MMOL/L (ref 8–16)
AST SERPL-CCNC: 20 U/L (ref 10–40)
BILIRUB SERPL-MCNC: 0.6 MG/DL (ref 0.1–1)
BUN SERPL-MCNC: 13 MG/DL (ref 8–23)
CALCIUM SERPL-MCNC: 9.3 MG/DL (ref 8.7–10.5)
CHLORIDE SERPL-SCNC: 103 MMOL/L (ref 95–110)
CHOLEST SERPL-MCNC: 162 MG/DL (ref 120–199)
CHOLEST/HDLC SERPL: 2.1 {RATIO} (ref 2–5)
CO2 SERPL-SCNC: 25 MMOL/L (ref 23–29)
CREAT SERPL-MCNC: 0.8 MG/DL (ref 0.5–1.4)
ERYTHROCYTE [DISTWIDTH] IN BLOOD BY AUTOMATED COUNT: 11.7 % (ref 11.5–14.5)
EST. GFR  (NO RACE VARIABLE): >60 ML/MIN/1.73 M^2
GLUCOSE SERPL-MCNC: 88 MG/DL (ref 70–110)
HCT VFR BLD AUTO: 49.2 % (ref 37–48.5)
HDLC SERPL-MCNC: 77 MG/DL (ref 40–75)
HDLC SERPL: 47.5 % (ref 20–50)
HGB BLD-MCNC: 15.8 G/DL (ref 12–16)
LDLC SERPL CALC-MCNC: 71 MG/DL (ref 63–159)
MCH RBC QN AUTO: 30.6 PG (ref 27–31)
MCHC RBC AUTO-ENTMCNC: 32.1 G/DL (ref 32–36)
MCV RBC AUTO: 95 FL (ref 82–98)
NONHDLC SERPL-MCNC: 85 MG/DL
PLATELET # BLD AUTO: 188 K/UL (ref 150–450)
PMV BLD AUTO: 11.8 FL (ref 9.2–12.9)
POTASSIUM SERPL-SCNC: 3.9 MMOL/L (ref 3.5–5.1)
PROT SERPL-MCNC: 6.9 G/DL (ref 6–8.4)
RBC # BLD AUTO: 5.17 M/UL (ref 4–5.4)
SODIUM SERPL-SCNC: 139 MMOL/L (ref 136–145)
TRIGL SERPL-MCNC: 70 MG/DL (ref 30–150)
TSH SERPL DL<=0.005 MIU/L-ACNC: 2.66 UIU/ML (ref 0.4–4)
WBC # BLD AUTO: 4.83 K/UL (ref 3.9–12.7)

## 2023-11-01 PROCEDURE — 3044F PR MOST RECENT HEMOGLOBIN A1C LEVEL <7.0%: ICD-10-PCS | Mod: CPTII,S$GLB,, | Performed by: FAMILY MEDICINE

## 2023-11-01 PROCEDURE — 1159F MED LIST DOCD IN RCRD: CPT | Mod: CPTII,S$GLB,, | Performed by: FAMILY MEDICINE

## 2023-11-01 PROCEDURE — 99397 PER PM REEVAL EST PAT 65+ YR: CPT | Mod: S$GLB,,, | Performed by: FAMILY MEDICINE

## 2023-11-01 PROCEDURE — 1126F PR PAIN SEVERITY QUANTIFIED, NO PAIN PRESENT: ICD-10-PCS | Mod: CPTII,S$GLB,, | Performed by: FAMILY MEDICINE

## 2023-11-01 PROCEDURE — 1126F AMNT PAIN NOTED NONE PRSNT: CPT | Mod: CPTII,S$GLB,, | Performed by: FAMILY MEDICINE

## 2023-11-01 PROCEDURE — 99999 PR PBB SHADOW E&M-EST. PATIENT-LVL IV: CPT | Mod: PBBFAC,,, | Performed by: FAMILY MEDICINE

## 2023-11-01 PROCEDURE — 3079F DIAST BP 80-89 MM HG: CPT | Mod: CPTII,S$GLB,, | Performed by: FAMILY MEDICINE

## 2023-11-01 PROCEDURE — 84443 ASSAY THYROID STIM HORMONE: CPT | Performed by: FAMILY MEDICINE

## 2023-11-01 PROCEDURE — 3044F HG A1C LEVEL LT 7.0%: CPT | Mod: CPTII,S$GLB,, | Performed by: FAMILY MEDICINE

## 2023-11-01 PROCEDURE — 99397 PR PREVENTIVE VISIT,EST,65 & OVER: ICD-10-PCS | Mod: S$GLB,,, | Performed by: FAMILY MEDICINE

## 2023-11-01 PROCEDURE — 3008F PR BODY MASS INDEX (BMI) DOCUMENTED: ICD-10-PCS | Mod: CPTII,S$GLB,, | Performed by: FAMILY MEDICINE

## 2023-11-01 PROCEDURE — 80053 COMPREHEN METABOLIC PANEL: CPT | Performed by: FAMILY MEDICINE

## 2023-11-01 PROCEDURE — 85027 COMPLETE CBC AUTOMATED: CPT | Performed by: FAMILY MEDICINE

## 2023-11-01 PROCEDURE — 1159F PR MEDICATION LIST DOCUMENTED IN MEDICAL RECORD: ICD-10-PCS | Mod: CPTII,S$GLB,, | Performed by: FAMILY MEDICINE

## 2023-11-01 PROCEDURE — 3008F BODY MASS INDEX DOCD: CPT | Mod: CPTII,S$GLB,, | Performed by: FAMILY MEDICINE

## 2023-11-01 PROCEDURE — 80061 LIPID PANEL: CPT | Performed by: FAMILY MEDICINE

## 2023-11-01 PROCEDURE — 36415 COLL VENOUS BLD VENIPUNCTURE: CPT | Mod: PN | Performed by: FAMILY MEDICINE

## 2023-11-01 PROCEDURE — 3288F PR FALLS RISK ASSESSMENT DOCUMENTED: ICD-10-PCS | Mod: CPTII,S$GLB,, | Performed by: FAMILY MEDICINE

## 2023-11-01 PROCEDURE — 3079F PR MOST RECENT DIASTOLIC BLOOD PRESSURE 80-89 MM HG: ICD-10-PCS | Mod: CPTII,S$GLB,, | Performed by: FAMILY MEDICINE

## 2023-11-01 PROCEDURE — 3075F SYST BP GE 130 - 139MM HG: CPT | Mod: CPTII,S$GLB,, | Performed by: FAMILY MEDICINE

## 2023-11-01 PROCEDURE — 1101F PT FALLS ASSESS-DOCD LE1/YR: CPT | Mod: CPTII,S$GLB,, | Performed by: FAMILY MEDICINE

## 2023-11-01 PROCEDURE — 99999 PR PBB SHADOW E&M-EST. PATIENT-LVL IV: ICD-10-PCS | Mod: PBBFAC,,, | Performed by: FAMILY MEDICINE

## 2023-11-01 PROCEDURE — 3288F FALL RISK ASSESSMENT DOCD: CPT | Mod: CPTII,S$GLB,, | Performed by: FAMILY MEDICINE

## 2023-11-01 PROCEDURE — 3075F PR MOST RECENT SYSTOLIC BLOOD PRESS GE 130-139MM HG: ICD-10-PCS | Mod: CPTII,S$GLB,, | Performed by: FAMILY MEDICINE

## 2023-11-01 PROCEDURE — 1101F PR PT FALLS ASSESS DOC 0-1 FALLS W/OUT INJ PAST YR: ICD-10-PCS | Mod: CPTII,S$GLB,, | Performed by: FAMILY MEDICINE

## 2023-11-01 NOTE — TELEPHONE ENCOUNTER
----- Message from Caitlin Hernandez sent at 11/1/2023  3:05 PM CDT -----  Contact: [phone]  Patient called, requested a courtesy call from Dr Wolf's nurse in regards rsv shot. Thank you

## 2023-11-01 NOTE — ASSESSMENT & PLAN NOTE
Stable, continue Atorvastatin 10 daily  Move more, sit less  High fiber, low fat foods  Try to eat 5 fresh COLORS a day

## 2023-11-07 ENCOUNTER — TELEPHONE (OUTPATIENT)
Dept: PRIMARY CARE CLINIC | Facility: CLINIC | Age: 73
End: 2023-11-07
Payer: COMMERCIAL

## 2023-11-07 NOTE — TELEPHONE ENCOUNTER
----- Message from Cindi Wolf MD sent at 11/7/2023  1:28 PM CST -----  Your form is complete. Feel free to come & pick it up

## 2023-11-28 DIAGNOSIS — M54.12 CERVICAL RADICULOPATHY: ICD-10-CM

## 2023-11-28 DIAGNOSIS — M50.30 DDD (DEGENERATIVE DISC DISEASE), CERVICAL: ICD-10-CM

## 2023-11-28 RX ORDER — GABAPENTIN 100 MG/1
100 CAPSULE ORAL 3 TIMES DAILY
Qty: 90 CAPSULE | Refills: 11 | Status: SHIPPED | OUTPATIENT
Start: 2023-11-28 | End: 2024-03-04

## 2024-01-03 DIAGNOSIS — J30.2 SEASONAL ALLERGIES: ICD-10-CM

## 2024-01-03 DIAGNOSIS — E78.5 HYPERLIPIDEMIA, UNSPECIFIED HYPERLIPIDEMIA TYPE: ICD-10-CM

## 2024-01-03 DIAGNOSIS — I10 HTN (HYPERTENSION), BENIGN: ICD-10-CM

## 2024-01-03 RX ORDER — CETIRIZINE HYDROCHLORIDE 10 MG/1
10 TABLET ORAL DAILY
Qty: 90 TABLET | Refills: 2 | Status: SHIPPED | OUTPATIENT
Start: 2024-01-03 | End: 2025-01-02

## 2024-01-03 NOTE — TELEPHONE ENCOUNTER
Care Due:                  Date            Visit Type   Department     Provider  --------------------------------------------------------------------------------                                EP -                              PRIMARY      [facility]  Last Visit: 11-      CARE ([facility])   DENNIS Wolf  Next Visit: None Scheduled  None         None Found                                                            Last  Test          Frequency    Reason                     Performed    Due Date  --------------------------------------------------------------------------------    Mg Level....  12 months..  risedronate..............  Not Found    Overdue    Phosphate...  12 months..  risedronate..............  Not Found    Overdue    Vitamin D...  12 months..  risedronate..............  02- 01-    Health [facility] Embedded Care Due Messages. Reference number: [id number].   1/03/2024 1:59:08 PM CST

## 2024-01-04 RX ORDER — ATORVASTATIN CALCIUM 10 MG/1
10 TABLET, FILM COATED ORAL DAILY
Qty: 90 TABLET | Refills: 2 | Status: SHIPPED | OUTPATIENT
Start: 2024-01-04

## 2024-01-04 RX ORDER — RISEDRONATE SODIUM 150 MG/1
TABLET, FILM COATED ORAL
Qty: 4 TABLET | Refills: 2 | Status: SHIPPED | OUTPATIENT
Start: 2024-01-04

## 2024-01-04 RX ORDER — HYDROCHLOROTHIAZIDE 12.5 MG/1
12.5 CAPSULE ORAL DAILY
Qty: 90 CAPSULE | Refills: 2 | Status: SHIPPED | OUTPATIENT
Start: 2024-01-04

## 2024-01-30 ENCOUNTER — OFFICE VISIT (OUTPATIENT)
Dept: OTOLARYNGOLOGY | Facility: CLINIC | Age: 74
End: 2024-01-30
Payer: COMMERCIAL

## 2024-01-30 VITALS
HEART RATE: 71 BPM | HEIGHT: 59 IN | BODY MASS INDEX: 23.63 KG/M2 | DIASTOLIC BLOOD PRESSURE: 83 MMHG | SYSTOLIC BLOOD PRESSURE: 155 MMHG | WEIGHT: 117.19 LBS

## 2024-01-30 DIAGNOSIS — J30.9 ALLERGIC RHINITIS, UNSPECIFIED SEASONALITY, UNSPECIFIED TRIGGER: Primary | ICD-10-CM

## 2024-01-30 DIAGNOSIS — J34.2 NASAL SEPTAL DEVIATION: ICD-10-CM

## 2024-01-30 DIAGNOSIS — H10.12 ALLERGIC CONJUNCTIVITIS OF LEFT EYE: ICD-10-CM

## 2024-01-30 DIAGNOSIS — H69.93 DYSFUNCTION OF BOTH EUSTACHIAN TUBES: ICD-10-CM

## 2024-01-30 DIAGNOSIS — H90.41 SENSORINEURAL HEARING LOSS (SNHL) OF RIGHT EAR WITH UNRESTRICTED HEARING OF LEFT EAR: ICD-10-CM

## 2024-01-30 PROCEDURE — 3077F SYST BP >= 140 MM HG: CPT | Mod: CPTII,S$GLB,, | Performed by: SPECIALIST

## 2024-01-30 PROCEDURE — 99214 OFFICE O/P EST MOD 30 MIN: CPT | Mod: S$GLB,,, | Performed by: SPECIALIST

## 2024-01-30 PROCEDURE — 1159F MED LIST DOCD IN RCRD: CPT | Mod: CPTII,S$GLB,, | Performed by: SPECIALIST

## 2024-01-30 PROCEDURE — 99999 PR PBB SHADOW E&M-EST. PATIENT-LVL IV: CPT | Mod: PBBFAC,,, | Performed by: SPECIALIST

## 2024-01-30 PROCEDURE — 3079F DIAST BP 80-89 MM HG: CPT | Mod: CPTII,S$GLB,, | Performed by: SPECIALIST

## 2024-01-30 PROCEDURE — 1160F RVW MEDS BY RX/DR IN RCRD: CPT | Mod: CPTII,S$GLB,, | Performed by: SPECIALIST

## 2024-01-30 PROCEDURE — 3008F BODY MASS INDEX DOCD: CPT | Mod: CPTII,S$GLB,, | Performed by: SPECIALIST

## 2024-01-30 PROCEDURE — 1126F AMNT PAIN NOTED NONE PRSNT: CPT | Mod: CPTII,S$GLB,, | Performed by: SPECIALIST

## 2024-01-30 PROCEDURE — 1101F PT FALLS ASSESS-DOCD LE1/YR: CPT | Mod: CPTII,S$GLB,, | Performed by: SPECIALIST

## 2024-01-30 PROCEDURE — 3288F FALL RISK ASSESSMENT DOCD: CPT | Mod: CPTII,S$GLB,, | Performed by: SPECIALIST

## 2024-01-30 RX ORDER — MONTELUKAST SODIUM 10 MG/1
TABLET ORAL
Qty: 30 TABLET | Refills: 11
Start: 2024-01-30

## 2024-01-31 NOTE — PROGRESS NOTES
Subjective:       Patient ID: Yuliana Soares is a 73 y.o. female.    Chief Complaint: Follow-up (Allergies persistant)    The patient is  coming in for follow-up visit.  There are multiple issues to discuss:  1. Allergic rhinitis:   The patient is breathing well through her nose.  She is using azelastine nasal spray daily and supplements with montelukast when needed.  If she uses both on a daily basis she is excessively dry.  She stops taking fluticasone nasal spray because she did not feel there was any benefit from doing so.  2. Allergic conjunctivitis:   Azelastine ophthalmic drops are giving her good symptom control for her eyes.  3. Dry eyes:  The patient is having itching and dryness in her eyes.  4. Hearing loss:  She is returning to discuss results discuss results of the MRI that was performed because of her asymmetrical hearing loss.  She has noticed no change in her hearing.          Review of Systems     Constitutional: Positive for fatigue.  Negative for appetite change, chills, fever and unexpected weight loss.      HENT: Positive for postnasal drip, runny nose, sinus pressure, sore throat and stuffy nose.  Negative for ear discharge, ear infection, ear pain, facial swelling, hearing loss, mouth sores, nosebleeds, ringing in the ears, sinus infection, tonsil infection, dental problems, trouble swallowing and voice change.      Eyes:  Positive for change in eyesight and eye drainage. Negative for eye itching and photophobia.     Respiratory:  Positive for cough. Negative for shortness of breath, sleep apnea, snoring and wheezing.      Cardiovascular:  Negative for chest pain, foot swelling, irregular heartbeat and swollen veins.     Gastrointestinal:  Negative for abdominal pain, acid reflux, constipation, diarrhea, heartburn and vomiting.     Genitourinary: Negative for difficulty urinating, sexual problems and frequent urination.     Musc: Positive for neck pain. Negative for aching joints,  aching muscles and back pain.     Skin: Negative for rash.     Allergy: Positive for seasonal allergies. Negative for food allergies.     Endocrine: Negative for cold intolerance and heat intolerance.      Neurological: Positive for headaches. Negative for dizziness, light-headedness, seizures and tremors.      Hematologic: Negative for bruises/bleeds easily.      Psychiatric: Negative for decreased concentration, depression, nervous/anxious and sleep disturbance.                Objective:      Physical Exam  Vitals and nursing note reviewed.   Constitutional:       General: She is awake.      Appearance: Normal appearance. She is well-developed, well-groomed and normal weight.   HENT:      Head: Normocephalic.      Jaw: There is normal jaw occlusion.      Salivary Glands: Right salivary gland is not diffusely enlarged. Left salivary gland is not diffusely enlarged.      Right Ear: Hearing, ear canal and external ear normal. Tympanic membrane is retracted. Tympanic membrane has decreased mobility.      Left Ear: Hearing, ear canal and external ear normal. Tympanic membrane is retracted. Tympanic membrane has decreased mobility.      Nose: Septal deviation (Septum deviated to the left), mucosal edema (cyanotic, boggy inferior turbinates bilaterally) and rhinorrhea (clear mucus bilaterally) present. No nasal deformity. Rhinorrhea is clear.      Right Turbinates: Enlarged and pale.      Left Turbinates: Enlarged and pale.        Mouth/Throat:      Lips: No lesions.      Mouth: No oral lesions.      Dentition: No gum lesions.      Tongue: No lesions.      Palate: No mass and lesions.      Pharynx: Oropharynx is clear. Uvula midline.   Eyes:      General: Lids are normal.         Right eye: No discharge.         Left eye: No discharge.      Conjunctiva/sclera:      Right eye: Right conjunctiva is injected. No exudate.     Left eye: Left conjunctiva is injected. No exudate.     Pupils: Pupils are equal, round, and  reactive to light.   Neck:      Thyroid: No thyroid mass or thyromegaly.      Trachea: Trachea normal. No tracheal deviation.   Cardiovascular:      Rate and Rhythm: Normal rate and regular rhythm.      Pulses: Normal pulses.      Heart sounds: Normal heart sounds.   Pulmonary:      Effort: Pulmonary effort is normal.      Breath sounds: Normal breath sounds. No stridor. No decreased breath sounds, wheezing, rhonchi or rales.   Abdominal:      General: Bowel sounds are normal.      Palpations: Abdomen is soft.      Tenderness: There is no abdominal tenderness.   Musculoskeletal:      Cervical back: Neck supple. No muscular tenderness. Decreased range of motion.   Lymphadenopathy:      Head:      Right side of head: No submental, submandibular, preauricular, posterior auricular or occipital adenopathy.      Left side of head: No submental, submandibular, preauricular, posterior auricular or occipital adenopathy.      Cervical: No cervical adenopathy.   Skin:     General: Skin is warm and dry.      Findings: No petechiae or rash.      Nails: There is no clubbing.   Neurological:      Mental Status: She is alert and oriented to person, place, and time.      Cranial Nerves: No cranial nerve deficit.      Sensory: No sensory deficit.      Gait: Gait normal.   Psychiatric:         Speech: Speech normal.         Behavior: Behavior normal. Behavior is cooperative.         Thought Content: Thought content normal.         Judgment: Judgment normal.         MRI IAC/TEMPORAL BONES W W/O CONTRAST     CLINICAL HISTORY:  Hearing loss, sensorineural;Sensorineural hearing loss, unilateral, right ear, with restricted hearing on the contralateral side     TECHNIQUE:  Multiplanar multisequence MR imaging of the IACs was performed before and after the administration of 6 mL Gadavistintravenous contrast. Additional limited whole brain imaging was also performed.     COMPARISON:  None     FINDINGS:  There is no evidence of an sing lesion  intracranially with attention to the CP angle/IAC/labyrinth.     No hydrocephalus mass is a intracranial hemorrhage or acute infarct.     The brain parenchyma including the brainstem maintains normal signal intensity.     Normal arterial flow voids are preserved at the skull base.     The visualized sinuses and mastoid air cells are clear.     Impression:     No acute intracranial process.  No enhancing intracranial lesion with attention to the CP angle/IAC/labyrinth.        Electronically signed by: Andres Gillette  Date:                                            08/29/2023      Assessment:       1. Allergic rhinitis, unspecified seasonality, unspecified trigger    2. Dysfunction of both eustachian tubes    3. Nasal septal deviation    4. Sensorineural hearing loss (SNHL) of right ear with unrestricted hearing of left ear    5. Allergic conjunctivitis of left eye          Plan:       I   will have the patient continue her present drug regimen.  I will recheck her in 6 months.  She will need undergo a comprehensive auditory evaluation prior to seeing me at that visit.

## 2024-02-05 PROBLEM — Z00.00 ROUTINE MEDICAL EXAM: Status: RESOLVED | Noted: 2017-02-17 | Resolved: 2024-02-05

## 2024-03-04 ENCOUNTER — OFFICE VISIT (OUTPATIENT)
Dept: OPTOMETRY | Facility: CLINIC | Age: 74
End: 2024-03-04
Payer: COMMERCIAL

## 2024-03-04 DIAGNOSIS — Z96.1 PSEUDOPHAKIA OF BOTH EYES: ICD-10-CM

## 2024-03-04 DIAGNOSIS — H52.13 MYOPIA WITH ASTIGMATISM AND PRESBYOPIA, BILATERAL: ICD-10-CM

## 2024-03-04 DIAGNOSIS — Z98.890 HISTORY OF RADIAL KERATOTOMY: ICD-10-CM

## 2024-03-04 DIAGNOSIS — H52.203 MYOPIA WITH ASTIGMATISM AND PRESBYOPIA, BILATERAL: ICD-10-CM

## 2024-03-04 DIAGNOSIS — H04.123 DRY EYES, BILATERAL: Primary | ICD-10-CM

## 2024-03-04 DIAGNOSIS — H52.4 MYOPIA WITH ASTIGMATISM AND PRESBYOPIA, BILATERAL: ICD-10-CM

## 2024-03-04 PROCEDURE — 99999 PR PBB SHADOW E&M-EST. PATIENT-LVL II: CPT | Mod: PBBFAC,,, | Performed by: OPTOMETRIST

## 2024-03-04 PROCEDURE — 92014 COMPRE OPH EXAM EST PT 1/>: CPT | Mod: S$GLB,,, | Performed by: OPTOMETRIST

## 2024-03-04 PROCEDURE — 1159F MED LIST DOCD IN RCRD: CPT | Mod: CPTII,S$GLB,, | Performed by: OPTOMETRIST

## 2024-03-04 PROCEDURE — 1160F RVW MEDS BY RX/DR IN RCRD: CPT | Mod: CPTII,S$GLB,, | Performed by: OPTOMETRIST

## 2024-03-04 PROCEDURE — 92015 DETERMINE REFRACTIVE STATE: CPT | Mod: S$GLB,,, | Performed by: OPTOMETRIST

## 2024-03-04 PROCEDURE — 1101F PT FALLS ASSESS-DOCD LE1/YR: CPT | Mod: CPTII,S$GLB,, | Performed by: OPTOMETRIST

## 2024-03-04 PROCEDURE — 1126F AMNT PAIN NOTED NONE PRSNT: CPT | Mod: CPTII,S$GLB,, | Performed by: OPTOMETRIST

## 2024-03-04 PROCEDURE — 3288F FALL RISK ASSESSMENT DOCD: CPT | Mod: CPTII,S$GLB,, | Performed by: OPTOMETRIST

## 2024-03-04 NOTE — PROGRESS NOTES
HPI    FAN: 03/07/2022   Chief complaint (CC):  74 yo M here for annual eye exam. Pt c/o tearing,   burning and irritation OU. Pt denies any other ocular or visual complaints   today.  Glasses? Yes  Contacts? No  H/o eye surgery, injections or laser: Radial keratotomy 1981   H/o eye injury: No  Known eye conditions? No  Family h/o eye conditions? No  Eye gtts? Allergy drops ; OTC artifical tears       (-) Flashes (-)  Floaters (-) Mucous   (+)  Tearing (+) Itching (+) Burning   (-) Headaches (-) Eye Pain/discomfort (+) Irritation   (+)  Redness (-) Double vision (-) Blurry vision    Diabetic? No  A1c? Lab Results       Component                Value               Date                       HGBA1C                   5.5                 01/27/2023                 Last edited by Sami Coreas, OD on 3/4/2024  3:18 PM.            Assessment /Plan     For exam results, see Encounter Report.        Dry eyes, bilateral  History of radial keratotomy   -Recommend artificial tears. 1 drop 4x per day. Chronicity of disease and treatment discussed.     Pseudophakia of both eyes   - Doing well. Monitor.     Myopia with astigmatism and presbyopia, bilateral   - New Spectacle Rx given, discussed different options for glasses. Rx given for PAL and Computer only glasses.   - RTC 1 year for routine eye exam.

## 2024-03-05 DIAGNOSIS — E78.5 HYPERLIPIDEMIA, UNSPECIFIED HYPERLIPIDEMIA TYPE: ICD-10-CM

## 2024-03-05 DIAGNOSIS — I10 HTN (HYPERTENSION), BENIGN: ICD-10-CM

## 2024-03-05 NOTE — TELEPHONE ENCOUNTER
----- Message from Nelly Auguste sent at 3/5/2024  3:40 PM CST -----  Contact: [phone]  Requesting an RX refill or new RX.hydroCHLOROthiazide (MICROZIDE) 12.5 mg capsule  Is this a refill or new RX: refill  RX name and strength (copy/paste from chart):    Is this a 30 day or 90 day RX: 90 day  Pharmacy name and phone # [facility]/pharmacy #[id number] [facility] [employer]   Phone: [phone]   The doctors have asked that we provide their patients with the following 2 reminders -- prescription refills can take up to 72 hours, and a friendly reminder that in the future you can use your [facility] account to request refills:     Requesting an RX refill or new RX.risedronate (ACTONEL) 150 MG Tab  Is this a refill or new RX: refill  RX name and strength (copy/paste from chart):    Is this a 30 day or 90 day RX:   Pharmacy name and phone # [facility]/pharmacy #[id number] [facility] [address]   Phone: [phone]    The doctors have asked that we provide their patients with the following 2 reminders -- prescription refills can take up to 72 hours, and a friendly reminder that in the future you can use your [facility] account to request refills:     Requesting an RX refill or new RX.atorvastatin (LIPITOR) 10 MG tablet  Is this a refill or new RX: refill  RX name and strength (copy/paste from chart):    Is this a 30 day or 90 day RX:   Pharmacy name and phone # [facility]/pharmacy #[id number] [facility] [place] - [address]   Phone: [phone]  The doctors have asked that we provide their patients with the following 2 reminders -- prescription refills can take up to 72 hours, and a friendly reminder that in the future you can use your [facility] account to request refills:        left

## 2024-03-05 NOTE — TELEPHONE ENCOUNTER
Care Due:                  Date            Visit Type   Department     Provider  --------------------------------------------------------------------------------                                EP -                              PRIMARY      [facility]  Last Visit: 11-      CARE ([facility])   DENNIS Wolf  Next Visit: None Scheduled  None         None Found                                                            Last  Test          Frequency    Reason                     Performed    Due Date  --------------------------------------------------------------------------------    Mg Level....  12 months..  risedronate..............  Not Found    Overdue    Phosphate...  12 months..  risedronate..............  Not Found    Overdue    Vitamin D...  12 months..  risedronate..............  02- 01-    Health [facility] Embedded Care Due Messages. Reference number: [id number].   3/05/2024 3:51:10 PM CST

## 2024-03-07 RX ORDER — RISEDRONATE SODIUM 150 MG/1
TABLET, FILM COATED ORAL
Qty: 12 TABLET | Refills: 2 | Status: SHIPPED | OUTPATIENT
Start: 2024-03-07

## 2024-03-07 RX ORDER — HYDROCHLOROTHIAZIDE 12.5 MG/1
12.5 CAPSULE ORAL DAILY
Qty: 90 CAPSULE | Refills: 2 | Status: SHIPPED | OUTPATIENT
Start: 2024-03-07

## 2024-03-07 RX ORDER — ATORVASTATIN CALCIUM 10 MG/1
10 TABLET, FILM COATED ORAL DAILY
Qty: 90 TABLET | Refills: 2 | Status: SHIPPED | OUTPATIENT
Start: 2024-03-07

## 2024-04-04 ENCOUNTER — TELEPHONE (OUTPATIENT)
Dept: PRIMARY CARE CLINIC | Facility: CLINIC | Age: 74
End: 2024-04-04
Payer: COMMERCIAL

## 2024-04-04 DIAGNOSIS — Z12.31 OTHER SCREENING MAMMOGRAM: Primary | ICD-10-CM

## 2024-04-04 NOTE — TELEPHONE ENCOUNTER
----- Message from Brandy Mendoza sent at 4/4/2024 10:02 AM CDT -----  Type:  Patient Requesting Referral    Who Called:Ras SHAW  Does the patient already have the specialty appointment scheduled?:yes  If yes, what is the date of that appointment?:04/08/24  Referral to What Specialty:Mammo  Reason for Referral:Annual  Does the patient want the referral with a specific physician?:yes  [facility]  Patient Requesting a Response?:no  Would the patient rather a call back or a response via [facility]? call  Best Call Back Number: [phone]  Additional Information: Need new orders for Mammo scheduled on 04/08/24

## 2024-04-08 ENCOUNTER — HOSPITAL ENCOUNTER (OUTPATIENT)
Dept: RADIOLOGY | Facility: OTHER | Age: 74
Discharge: HOME | End: 2024-04-08
Attending: FAMILY MEDICINE
Payer: COMMERCIAL

## 2024-04-08 DIAGNOSIS — Z12.31 OTHER SCREENING MAMMOGRAM: ICD-10-CM

## 2024-04-08 PROCEDURE — 77067 SCR MAMMO BI INCL CAD: CPT | Mod: 26,,, | Performed by: RADIOLOGY

## 2024-04-08 PROCEDURE — 77063 BREAST TOMOSYNTHESIS BI: CPT | Mod: 26,,, | Performed by: RADIOLOGY

## 2024-04-08 PROCEDURE — 77063 BREAST TOMOSYNTHESIS BI: CPT | Mod: TC

## 2024-05-09 ENCOUNTER — OFFICE VISIT (OUTPATIENT)
Dept: URGENT CARE | Facility: CLINIC | Age: 74
End: 2024-05-09
Payer: COMMERCIAL

## 2024-05-09 VITALS
BODY MASS INDEX: 22.98 KG/M2 | RESPIRATION RATE: 18 BRPM | HEART RATE: 75 BPM | OXYGEN SATURATION: 97 % | WEIGHT: 114 LBS | TEMPERATURE: 99 F | DIASTOLIC BLOOD PRESSURE: 95 MMHG | SYSTOLIC BLOOD PRESSURE: 159 MMHG | HEIGHT: 59 IN

## 2024-05-09 DIAGNOSIS — R05.9 COUGH, UNSPECIFIED TYPE: ICD-10-CM

## 2024-05-09 DIAGNOSIS — J06.9 VIRAL URI WITH COUGH: Primary | ICD-10-CM

## 2024-05-09 DIAGNOSIS — J02.9 SORE THROAT: ICD-10-CM

## 2024-05-09 LAB
CTP QC/QA: YES
MOLECULAR STREP A: NEGATIVE

## 2024-05-09 PROCEDURE — 99214 OFFICE O/P EST MOD 30 MIN: CPT | Mod: S$GLB,,, | Performed by: NURSE PRACTITIONER

## 2024-05-09 PROCEDURE — 87651 STREP A DNA AMP PROBE: CPT | Mod: QW,S$GLB,, | Performed by: NURSE PRACTITIONER

## 2024-05-09 RX ORDER — PROMETHAZINE HYDROCHLORIDE AND DEXTROMETHORPHAN HYDROBROMIDE 6.25; 15 MG/5ML; MG/5ML
5 SYRUP ORAL EVERY 8 HOURS PRN
Qty: 180 ML | Refills: 0 | Status: SHIPPED | OUTPATIENT
Start: 2024-05-09

## 2024-05-09 NOTE — PATIENT INSTRUCTIONS
- You must understand that you have received an Urgent Care treatment only and that you may be released before all of your medical problems are known or treated.   - You, the patient, will arrange for follow up care as instructed.   - If your condition worsens or fails to improve we recommend that you receive another evaluation at the ER immediately or contact your PCP to discuss your concerns.   - You can call [phone] or [phone] to help schedule an appointment with the appropriate provider.    Drink plenty of fluids   Get lots of rest  Tylenol or ibuprofen for pain/fever  Mucinex DM for daytime cough  Prescription cough syrup for night time cough. This medication will make you drowsy. Do not drink alcohol or  Operate machinery while taking this medicine.   Saline nasal rinses to irrigate sinus cavities  Warm salt water gargles for sore throat

## 2024-05-09 NOTE — PROGRESS NOTES
"Subjective:      Patient ID: Yuliana Soares is a 73 y.o. female.    Vitals:  height is 4' 11" (1.499 m) and weight is 51.7 kg (114 lb). Her oral temperature is 98.7 °F (37.1 °C). Her blood pressure is 159/95 (abnormal) and her pulse is 75. Her respiration is 18 and oxygen saturation is 97%.     Chief Complaint: Sore Throat    Patient is a 72 yo female who presents w/ c.o. sore throat. Symptoms include trouble swallowing. Symptoms began Tuesday. She has been taking dayquil and nyquil for his symptoms with mild relief.     Sore Throat   The current episode started in the past 7 days. The problem has been unchanged. The pain is worse on the left side. There has been no fever. The pain is at a severity of 4/10. The pain is mild. Associated symptoms include trouble swallowing. Pertinent negatives include no abdominal pain, congestion, coughing, diarrhea, ear discharge, ear pain, headaches, hoarse voice, plugged ear sensation or neck pain. Exposure to: unknown. Treatments tried: Dayquil. The treatment provided no relief.       HENT:  Positive for sore throat and trouble swallowing. Negative for ear pain, ear discharge and congestion.    Neck: Negative for neck pain.   Respiratory:  Negative for cough.    Gastrointestinal:  Negative for abdominal pain and diarrhea.   Neurological:  Negative for headaches.      Objective:     Physical Exam   Constitutional: She is oriented to person, place, and time. She appears well-developed. She is cooperative.  Non-toxic appearance. She does not appear ill. No distress.   HENT:   Head: Normocephalic and atraumatic.   Ears:   Right Ear: Hearing, tympanic membrane, external ear and ear canal normal.   Left Ear: Hearing, tympanic membrane, external ear and ear canal normal.   Nose: No mucosal edema, rhinorrhea or nasal deformity. No epistaxis. Right sinus exhibits no maxillary sinus tenderness and no frontal sinus tenderness. Left sinus exhibits maxillary sinus tenderness. Left " sinus exhibits no frontal sinus tenderness.   Mouth/Throat: Uvula is midline and mucous membranes are normal. No trismus in the jaw. Normal dentition. No uvula swelling. Posterior oropharyngeal erythema present. No oropharyngeal exudate or posterior oropharyngeal edema.   Eyes: Conjunctivae and lids are normal. No scleral icterus.   Neck: Trachea normal and phonation normal. Neck supple. No edema present. No erythema present. No neck rigidity present.   Cardiovascular: Normal rate, regular rhythm, normal heart sounds and normal pulses.   Pulmonary/Chest: Effort normal and breath sounds normal. No respiratory distress. She has no decreased breath sounds. She has no rhonchi.   Abdominal: Normal appearance.   Musculoskeletal: Normal range of motion.         General: No deformity. Normal range of motion.   Neurological: She is alert and oriented to person, place, and time. She exhibits normal muscle tone. Coordination normal.   Skin: Skin is warm, dry, intact, not diaphoretic and not pale.   Psychiatric: Her speech is normal and behavior is normal. Judgment and thought content normal.   Nursing note and vitals reviewed.    Results for orders placed or performed in visit on 05/09/24   POCT Strep A, Molecular   Result Value Ref Range    Molecular Strep A, POC Negative Negative     Acceptable Yes      Assessment:     1. Viral URI with cough    2. Sore throat    3. Cough, unspecified type        Plan:       Viral URI with cough    Sore throat  -     POCT Strep A, Molecular    Cough, unspecified type  -     promethazine-dextromethorphan (PROMETHAZINE-DM) 6.25-15 mg/5 mL Syrp; Take 5 mLs by mouth every 8 (eight) hours as needed (cough).  Dispense: 180 mL; Refill: 0      Patient Instructions   - You must understand that you have received an Urgent Care treatment only and that you may be released before all of your medical problems are known or treated.   - You, the patient, will arrange for follow up care as  instructed.   - If your condition worsens or fails to improve we recommend that you receive another evaluation at the ER immediately or contact your PCP to discuss your concerns.   - You can call [phone] or [phone] to help schedule an appointment with the appropriate provider.    Drink plenty of fluids   Get lots of rest  Tylenol or ibuprofen for pain/fever  Mucinex DM for daytime cough  Prescription cough syrup for night time cough. This medication will make you drowsy. Do not drink alcohol or  Operate machinery while taking this medicine.   Saline nasal rinses to irrigate sinus cavities  Warm salt water gargles for sore throat

## 2024-05-23 ENCOUNTER — TELEPHONE (OUTPATIENT)
Dept: OTOLARYNGOLOGY | Facility: CLINIC | Age: 74
End: 2024-05-23

## 2024-05-23 NOTE — TELEPHONE ENCOUNTER
----- Message from Cierra Johnson MA sent at 5/22/2024 12:16 PM CDT -----    ----- Message -----  From: Angelica Torres AU.D  Sent: 5/22/2024   7:55 AM CDT  To: Cierra Johnson MA    Pt cx audio for tomorrow - would she like to reschedule?  If so, does she still want to come in at this time or wait until she is eligible for her annual audiogram on 7/11/24?

## 2024-06-08 ENCOUNTER — HOSPITAL ENCOUNTER (EMERGENCY)
Facility: OTHER | Age: 74
Discharge: HOME | End: 2024-06-08
Attending: EMERGENCY MEDICINE
Payer: COMMERCIAL

## 2024-06-08 VITALS
DIASTOLIC BLOOD PRESSURE: 86 MMHG | HEART RATE: 68 BPM | HEIGHT: 62 IN | WEIGHT: 114 LBS | SYSTOLIC BLOOD PRESSURE: 164 MMHG | OXYGEN SATURATION: 96 % | RESPIRATION RATE: 17 BRPM | TEMPERATURE: 98 F | BODY MASS INDEX: 20.98 KG/M2

## 2024-06-08 DIAGNOSIS — S61.411A LACERATION OF RIGHT HAND WITHOUT FOREIGN BODY, INITIAL ENCOUNTER: Primary | ICD-10-CM

## 2024-06-08 PROCEDURE — 90471 IMMUNIZATION ADMIN: CPT | Performed by: EMERGENCY MEDICINE

## 2024-06-08 PROCEDURE — 63600175 PHARM REV CODE 636 W HCPCS: Performed by: EMERGENCY MEDICINE

## 2024-06-08 PROCEDURE — 12001 RPR S/N/AX/GEN/TRNK 2.5CM/<: CPT

## 2024-06-08 PROCEDURE — 99284 EMERGENCY DEPT VISIT MOD MDM: CPT | Mod: 25

## 2024-06-08 PROCEDURE — 90715 TDAP VACCINE 7 YRS/> IM: CPT | Performed by: EMERGENCY MEDICINE

## 2024-06-08 PROCEDURE — 25000003 PHARM REV CODE 250: Performed by: EMERGENCY MEDICINE

## 2024-06-08 RX ORDER — LIDOCAINE HYDROCHLORIDE 10 MG/ML
10 INJECTION INFILTRATION; PERINEURAL
Status: COMPLETED | OUTPATIENT
Start: 2024-06-08 | End: 2024-06-08

## 2024-06-08 RX ORDER — IBUPROFEN 600 MG/1
600 TABLET ORAL
Status: COMPLETED | OUTPATIENT
Start: 2024-06-08 | End: 2024-06-08

## 2024-06-08 RX ADMIN — TETANUS TOXOID, REDUCED DIPHTHERIA TOXOID AND ACELLULAR PERTUSSIS VACCINE, ADSORBED 0.5 ML: 5; 2.5; 8; 8; 2.5 SUSPENSION INTRAMUSCULAR at 09:06

## 2024-06-08 RX ADMIN — LIDOCAINE HYDROCHLORIDE 10 ML: 10 INJECTION, SOLUTION INFILTRATION; PERINEURAL at 09:06

## 2024-06-08 RX ADMIN — IBUPROFEN 600 MG: 600 TABLET, FILM COATED ORAL at 09:06

## 2024-06-08 RX ADMIN — BACITRACIN ZINC, NEOMYCIN, POLYMYXIN B 1 EACH: 400; 3.5; 5 OINTMENT TOPICAL at 09:06

## 2024-06-09 NOTE — DISCHARGE INSTRUCTIONS
Follow-up with your doctor in approximately 1 week for suture removal, or you may return to the ER for suture removal.  Return to the ER at any time for concerns of infection such as increasing pain, redness, drainage from the wound.
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
## 2024-06-16 ENCOUNTER — OFFICE VISIT (OUTPATIENT)
Dept: URGENT CARE | Facility: CLINIC | Age: 74
End: 2024-06-16
Payer: COMMERCIAL

## 2024-06-16 VITALS
WEIGHT: 114 LBS | HEART RATE: 71 BPM | HEIGHT: 62 IN | RESPIRATION RATE: 16 BRPM | OXYGEN SATURATION: 97 % | SYSTOLIC BLOOD PRESSURE: 142 MMHG | BODY MASS INDEX: 20.98 KG/M2 | TEMPERATURE: 98 F | DIASTOLIC BLOOD PRESSURE: 95 MMHG

## 2024-06-16 DIAGNOSIS — Z48.02 VISIT FOR SUTURE REMOVAL: Primary | ICD-10-CM

## 2024-06-16 PROCEDURE — 99213 OFFICE O/P EST LOW 20 MIN: CPT | Mod: S$GLB,,, | Performed by: NURSE PRACTITIONER

## 2024-06-16 PROCEDURE — 99024 POSTOP FOLLOW-UP VISIT: CPT | Mod: S$GLB,,, | Performed by: NURSE PRACTITIONER

## 2024-06-16 NOTE — PROCEDURES
Suture Removal    Date/Time: 6/16/2024 1:45 PM  Location procedure was performed: [facility]    Performed by: Rosanne Hassan NP  Authorized by: Rosanne Hassan NP  Body area: upper extremity  Location details: right hand  Description of findings: well-healed incision with 2 intact sutures, no swelling, erythema, or drainage   Wound Appearance: clean, well healed, normal color, nontender, no drainage and nonpurulent  Sutures Removed: 2  Post-removal: no dressing applied  Complications: No  Patient tolerance: Patient tolerated the procedure well with no immediate complications

## 2024-06-16 NOTE — PROGRESS NOTES
"Subjective:      Patient ID: Yuliana Soares is a 73 y.o. female.    Vitals:  height is 5' 2" (1.575 m) and weight is 51.7 kg (114 lb). Her oral temperature is 97.6 °F (36.4 °C). Her blood pressure is 151/106 (abnormal) and her pulse is 71. Her respiration is 16 and oxygen saturation is 97%.     Chief Complaint: Suture / Staple Removal    72yo female pt reports for suture removal.  Reports that she received 2 sutures to her R hand after cutting her hand on a broken wineglass on 6/8.  Denies swelling, redness, or drainage, reports that area has healed well.    Suture / Staple Removal  The sutures were placed 7 to 10 days ago. She tried nothing since the wound repair. There has been no drainage from the wound. There is no redness present. There is no swelling present. There is no pain present.       Skin:  Positive for wound and laceration. Negative for erythema.      Objective:     Physical Exam   Constitutional: She is oriented to person, place, and time. She appears well-developed.   HENT:   Head: Normocephalic and atraumatic. Head is without abrasion, without contusion and without laceration.   Ears:   Right Ear: External ear normal.   Left Ear: External ear normal.   Nose: Nose normal.   Mouth/Throat: Oropharynx is clear and moist and mucous membranes are normal.   Eyes: Conjunctivae, EOM and lids are normal. Pupils are equal, round, and reactive to light.   Neck: Trachea normal and phonation normal. Neck supple.   Cardiovascular: Normal rate, regular rhythm and normal heart sounds.   Pulmonary/Chest: Effort normal and breath sounds normal. No stridor. No respiratory distress.   Musculoskeletal: Normal range of motion.         General: Normal range of motion.        Hands:    Neurological: She is alert and oriented to person, place, and time.   Skin: Skin is warm, dry, intact and no rash. Capillary refill takes less than 2 seconds. No abrasion, No burn, No bruising, No erythema and No ecchymosis "   Psychiatric: Her speech is normal and behavior is normal. Judgment and thought content normal.   Nursing note and vitals reviewed.        Assessment:     1. Visit for suture removal        Plan:     Suture Removal    Date/Time: 6/16/2024 1:45 PM  Location procedure was performed: [facility]    Performed by: Rosanne Hassan NP  Authorized by: Rosanne Hassan NP  Body area: upper extremity  Location details: right hand  Description of findings: well-healed incision with 2 intact sutures, no swelling, erythema, or drainage   Wound Appearance: clean, well healed, normal color, nontender, no drainage and nonpurulent  Sutures Removed: 2  Post-removal: no dressing applied  Complications: No  Patient tolerance: Patient tolerated the procedure well with no immediate complications        Provided education on proper continuing wound care.  Provided education on return/ER precautions.  Pt verbalized understanding and agreed to plan.      Visit for suture removal      Patient Instructions   If your condition worsens or fails to improve, we recommend that you receive another evaluation at the ER immediately, contact your PCP to discuss your concerns, or return here.  You must understand that you've received an urgent care treatment only, and that you may be released before all your medical problems are known or treated.  You, the patient, will arrange for follow-up care as instructed.     Avoid picking or manipulating the wound.  Clean the wound twice a day and put antibiotic ointment on it.  You should seek ER treatment if develop fever, increasing pain, swelling, red streaking, or other signs of increasing infection.     If you are female and on birth control pills, use additional methods to prevent pregnancy while on antibiotics and for one cycle after.  If you were given narcotics, do not drive or operate heavy equipment or machinery while taking these medications.     Tylenol or Ibuprofen can also  be used as directed for pain unless you have an allergy to them or medical condition (such as stomach ulcers, kidney or liver disease, or use blood thinners, etc.) for which you should not be taking these type of medications.

## 2024-06-27 ENCOUNTER — TELEPHONE (OUTPATIENT)
Dept: PRIMARY CARE CLINIC | Facility: CLINIC | Age: 74
End: 2024-06-27

## 2024-06-27 ENCOUNTER — OFFICE VISIT (OUTPATIENT)
Dept: PRIMARY CARE CLINIC | Facility: CLINIC | Age: 74
End: 2024-06-27
Payer: COMMERCIAL

## 2024-06-27 VITALS
DIASTOLIC BLOOD PRESSURE: 80 MMHG | HEIGHT: 62 IN | SYSTOLIC BLOOD PRESSURE: 110 MMHG | HEART RATE: 92 BPM | BODY MASS INDEX: 21.42 KG/M2 | WEIGHT: 116.38 LBS | OXYGEN SATURATION: 95 %

## 2024-06-27 DIAGNOSIS — Z00.00 ROUTINE GENERAL MEDICAL EXAMINATION AT A HEALTH CARE FACILITY: Primary | ICD-10-CM

## 2024-06-27 DIAGNOSIS — M81.0 OSTEOPOROSIS, UNSPECIFIED OSTEOPOROSIS TYPE, UNSPECIFIED PATHOLOGICAL FRACTURE PRESENCE: ICD-10-CM

## 2024-06-27 DIAGNOSIS — J04.0 LARYNGITIS: Primary | ICD-10-CM

## 2024-06-27 DIAGNOSIS — I10 HTN (HYPERTENSION), BENIGN: ICD-10-CM

## 2024-06-27 PROCEDURE — 1159F MED LIST DOCD IN RCRD: CPT | Mod: CPTII,S$GLB,, | Performed by: STUDENT IN AN ORGANIZED HEALTH CARE EDUCATION/TRAINING PROGRAM

## 2024-06-27 PROCEDURE — 99214 OFFICE O/P EST MOD 30 MIN: CPT | Mod: S$GLB,,, | Performed by: STUDENT IN AN ORGANIZED HEALTH CARE EDUCATION/TRAINING PROGRAM

## 2024-06-27 PROCEDURE — 3008F BODY MASS INDEX DOCD: CPT | Mod: CPTII,S$GLB,, | Performed by: STUDENT IN AN ORGANIZED HEALTH CARE EDUCATION/TRAINING PROGRAM

## 2024-06-27 PROCEDURE — 99999 PR PBB SHADOW E&M-EST. PATIENT-LVL IV: CPT | Mod: PBBFAC,,, | Performed by: STUDENT IN AN ORGANIZED HEALTH CARE EDUCATION/TRAINING PROGRAM

## 2024-06-27 PROCEDURE — 3074F SYST BP LT 130 MM HG: CPT | Mod: CPTII,S$GLB,, | Performed by: STUDENT IN AN ORGANIZED HEALTH CARE EDUCATION/TRAINING PROGRAM

## 2024-06-27 PROCEDURE — 3288F FALL RISK ASSESSMENT DOCD: CPT | Mod: CPTII,S$GLB,, | Performed by: STUDENT IN AN ORGANIZED HEALTH CARE EDUCATION/TRAINING PROGRAM

## 2024-06-27 PROCEDURE — 1126F AMNT PAIN NOTED NONE PRSNT: CPT | Mod: CPTII,S$GLB,, | Performed by: STUDENT IN AN ORGANIZED HEALTH CARE EDUCATION/TRAINING PROGRAM

## 2024-06-27 PROCEDURE — 1101F PT FALLS ASSESS-DOCD LE1/YR: CPT | Mod: CPTII,S$GLB,, | Performed by: STUDENT IN AN ORGANIZED HEALTH CARE EDUCATION/TRAINING PROGRAM

## 2024-06-27 PROCEDURE — 3079F DIAST BP 80-89 MM HG: CPT | Mod: CPTII,S$GLB,, | Performed by: STUDENT IN AN ORGANIZED HEALTH CARE EDUCATION/TRAINING PROGRAM

## 2024-06-27 PROCEDURE — 1160F RVW MEDS BY RX/DR IN RCRD: CPT | Mod: CPTII,S$GLB,, | Performed by: STUDENT IN AN ORGANIZED HEALTH CARE EDUCATION/TRAINING PROGRAM

## 2024-06-27 RX ORDER — METHYLPREDNISOLONE 4 MG/1
TABLET ORAL
Qty: 21 EACH | Refills: 0 | Status: SHIPPED | OUTPATIENT
Start: 2024-06-27 | End: 2024-07-18

## 2024-06-27 RX ORDER — METHYLPREDNISOLONE 4 MG/1
TABLET ORAL
Qty: 21 EACH | Refills: 0 | Status: SHIPPED | OUTPATIENT
Start: 2024-06-27 | End: 2024-06-27
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
## 2024-06-27 NOTE — TELEPHONE ENCOUNTER
----- Message from Aliyah Cross sent at 6/27/2024 12:09 PM CDT -----  Please add annual fasting labs to apt schedule on 11/22.

## 2024-08-05 ENCOUNTER — HOSPITAL ENCOUNTER (OUTPATIENT)
Dept: RADIOLOGY | Facility: OTHER | Age: 74
Discharge: HOME | End: 2024-08-05
Attending: STUDENT IN AN ORGANIZED HEALTH CARE EDUCATION/TRAINING PROGRAM
Payer: COMMERCIAL

## 2024-08-05 DIAGNOSIS — M81.0 OSTEOPOROSIS, UNSPECIFIED OSTEOPOROSIS TYPE, UNSPECIFIED PATHOLOGICAL FRACTURE PRESENCE: ICD-10-CM

## 2024-08-05 PROCEDURE — 77080 DXA BONE DENSITY AXIAL: CPT | Mod: 26,,, | Performed by: RADIOLOGY

## 2024-08-05 PROCEDURE — 77080 DXA BONE DENSITY AXIAL: CPT | Mod: TC

## 2024-08-05 RX ORDER — AZELASTINE HYDROCHLORIDE 0.5 MG/ML
1 SOLUTION/ DROPS OPHTHALMIC 2 TIMES DAILY
Qty: 6 ML | Refills: 11 | Status: SHIPPED | OUTPATIENT
Start: 2024-08-05 | End: 2025-08-05

## 2024-08-05 RX ORDER — AZELASTINE 1 MG/ML
SPRAY, METERED NASAL
Qty: 90 ML | Refills: 3 | Status: SHIPPED | OUTPATIENT
Start: 2024-08-05

## 2024-11-22 ENCOUNTER — LAB VISIT (OUTPATIENT)
Dept: LAB | Facility: HOSPITAL | Age: 74
End: 2024-11-22
Payer: COMMERCIAL

## 2024-11-22 DIAGNOSIS — Z00.00 ROUTINE GENERAL MEDICAL EXAMINATION AT A HEALTH CARE FACILITY: ICD-10-CM

## 2024-11-22 LAB
ALBUMIN SERPL BCP-MCNC: 4.2 G/DL (ref 3.5–5.2)
ALP SERPL-CCNC: 76 U/L (ref 40–150)
ALT SERPL W/O P-5'-P-CCNC: 17 U/L (ref 10–44)
ANION GAP SERPL CALC-SCNC: 10 MMOL/L (ref 8–16)
AST SERPL-CCNC: 21 U/L (ref 10–40)
BILIRUB SERPL-MCNC: 0.4 MG/DL (ref 0.1–1)
BUN SERPL-MCNC: 10 MG/DL (ref 8–23)
CALCIUM SERPL-MCNC: 9.4 MG/DL (ref 8.7–10.5)
CHLORIDE SERPL-SCNC: 102 MMOL/L (ref 95–110)
CHOLEST SERPL-MCNC: 181 MG/DL (ref 120–199)
CHOLEST/HDLC SERPL: 2.2 {RATIO} (ref 2–5)
CO2 SERPL-SCNC: 29 MMOL/L (ref 23–29)
CREAT SERPL-MCNC: 0.7 MG/DL (ref 0.5–1.4)
ERYTHROCYTE [DISTWIDTH] IN BLOOD BY AUTOMATED COUNT: 12.2 % (ref 11.5–14.5)
EST. GFR  (NO RACE VARIABLE): >60 ML/MIN/1.73 M^2
GLUCOSE SERPL-MCNC: 85 MG/DL (ref 70–110)
HCT VFR BLD AUTO: 48.4 % (ref 37–48.5)
HDLC SERPL-MCNC: 81 MG/DL (ref 40–75)
HDLC SERPL: 44.8 % (ref 20–50)
HGB BLD-MCNC: 16 G/DL (ref 12–16)
LDLC SERPL CALC-MCNC: 89.2 MG/DL (ref 63–159)
MCH RBC QN AUTO: 31.4 PG (ref 27–31)
MCHC RBC AUTO-ENTMCNC: 33.1 G/DL (ref 32–36)
MCV RBC AUTO: 95 FL (ref 82–98)
NONHDLC SERPL-MCNC: 100 MG/DL
PLATELET # BLD AUTO: 213 K/UL (ref 150–450)
PMV BLD AUTO: 11.7 FL (ref 9.2–12.9)
POTASSIUM SERPL-SCNC: 3.5 MMOL/L (ref 3.5–5.1)
PROT SERPL-MCNC: 7.1 G/DL (ref 6–8.4)
RBC # BLD AUTO: 5.09 M/UL (ref 4–5.4)
SODIUM SERPL-SCNC: 141 MMOL/L (ref 136–145)
TRIGL SERPL-MCNC: 54 MG/DL (ref 30–150)
WBC # BLD AUTO: 5.65 K/UL (ref 3.9–12.7)

## 2024-11-22 PROCEDURE — 80061 LIPID PANEL: CPT | Performed by: FAMILY MEDICINE

## 2024-11-22 PROCEDURE — 85027 COMPLETE CBC AUTOMATED: CPT | Performed by: FAMILY MEDICINE

## 2024-11-22 PROCEDURE — 80053 COMPREHEN METABOLIC PANEL: CPT | Performed by: FAMILY MEDICINE

## 2024-11-22 PROCEDURE — 36415 COLL VENOUS BLD VENIPUNCTURE: CPT | Mod: PN | Performed by: FAMILY MEDICINE

## 2024-11-27 ENCOUNTER — PATIENT MESSAGE (OUTPATIENT)
Dept: PRIMARY CARE CLINIC | Facility: CLINIC | Age: 74
End: 2024-11-27

## 2024-11-27 ENCOUNTER — OFFICE VISIT (OUTPATIENT)
Dept: PRIMARY CARE CLINIC | Facility: CLINIC | Age: 74
End: 2024-11-27
Payer: COMMERCIAL

## 2024-11-27 ENCOUNTER — E-CONSULT (OUTPATIENT)
Dept: ENDOCRINOLOGY | Facility: CLINIC | Age: 74
End: 2024-11-27
Payer: COMMERCIAL

## 2024-11-27 VITALS
BODY MASS INDEX: 22.81 KG/M2 | OXYGEN SATURATION: 98 % | WEIGHT: 116.19 LBS | DIASTOLIC BLOOD PRESSURE: 80 MMHG | TEMPERATURE: 98 F | HEART RATE: 86 BPM | HEIGHT: 60 IN | SYSTOLIC BLOOD PRESSURE: 126 MMHG

## 2024-11-27 DIAGNOSIS — E78.2 MIXED HYPERLIPIDEMIA: ICD-10-CM

## 2024-11-27 DIAGNOSIS — I10 HTN (HYPERTENSION), BENIGN: ICD-10-CM

## 2024-11-27 DIAGNOSIS — S39.012A STRAIN OF LUMBAR REGION, INITIAL ENCOUNTER: ICD-10-CM

## 2024-11-27 DIAGNOSIS — M81.0 OSTEOPOROSIS, UNSPECIFIED OSTEOPOROSIS TYPE, UNSPECIFIED PATHOLOGICAL FRACTURE PRESENCE: Primary | ICD-10-CM

## 2024-11-27 DIAGNOSIS — J30.1 SEASONAL ALLERGIC RHINITIS DUE TO POLLEN: ICD-10-CM

## 2024-11-27 DIAGNOSIS — Z00.00 ROUTINE GENERAL MEDICAL EXAMINATION AT A HEALTH CARE FACILITY: Primary | ICD-10-CM

## 2024-11-27 DIAGNOSIS — M81.0 OSTEOPOROSIS WITHOUT CURRENT PATHOLOGICAL FRACTURE, UNSPECIFIED OSTEOPOROSIS TYPE: Primary | ICD-10-CM

## 2024-11-27 DIAGNOSIS — M81.0 OSTEOPOROSIS WITHOUT CURRENT PATHOLOGICAL FRACTURE, UNSPECIFIED OSTEOPOROSIS TYPE: ICD-10-CM

## 2024-11-27 PROCEDURE — 1160F RVW MEDS BY RX/DR IN RCRD: CPT | Mod: CPTII,S$GLB,, | Performed by: FAMILY MEDICINE

## 2024-11-27 PROCEDURE — 1125F AMNT PAIN NOTED PAIN PRSNT: CPT | Mod: CPTII,S$GLB,, | Performed by: FAMILY MEDICINE

## 2024-11-27 PROCEDURE — 3008F BODY MASS INDEX DOCD: CPT | Mod: CPTII,S$GLB,, | Performed by: FAMILY MEDICINE

## 2024-11-27 PROCEDURE — 1101F PT FALLS ASSESS-DOCD LE1/YR: CPT | Mod: CPTII,S$GLB,, | Performed by: FAMILY MEDICINE

## 2024-11-27 PROCEDURE — 3074F SYST BP LT 130 MM HG: CPT | Mod: CPTII,S$GLB,, | Performed by: FAMILY MEDICINE

## 2024-11-27 PROCEDURE — 1159F MED LIST DOCD IN RCRD: CPT | Mod: CPTII,S$GLB,, | Performed by: FAMILY MEDICINE

## 2024-11-27 PROCEDURE — 3288F FALL RISK ASSESSMENT DOCD: CPT | Mod: CPTII,S$GLB,, | Performed by: FAMILY MEDICINE

## 2024-11-27 PROCEDURE — 99397 PER PM REEVAL EST PAT 65+ YR: CPT | Mod: S$GLB,,, | Performed by: FAMILY MEDICINE

## 2024-11-27 PROCEDURE — 3079F DIAST BP 80-89 MM HG: CPT | Mod: CPTII,S$GLB,, | Performed by: FAMILY MEDICINE

## 2024-11-27 PROCEDURE — 99999 PR PBB SHADOW E&M-EST. PATIENT-LVL III: CPT | Mod: PBBFAC,,, | Performed by: FAMILY MEDICINE

## 2024-11-27 RX ORDER — ATORVASTATIN CALCIUM 10 MG/1
10 TABLET, FILM COATED ORAL DAILY
Qty: 90 TABLET | Refills: 2 | Status: SHIPPED | OUTPATIENT
Start: 2024-11-27 | End: 2024-11-27

## 2024-11-27 RX ORDER — ATORVASTATIN CALCIUM 10 MG/1
10 TABLET, FILM COATED ORAL DAILY
Qty: 90 TABLET | Refills: 2 | Status: SHIPPED | OUTPATIENT
Start: 2024-11-27

## 2024-11-27 RX ORDER — RISEDRONATE SODIUM 150 MG/1
TABLET, FILM COATED ORAL
Qty: 12 TABLET | Refills: 2 | Status: SHIPPED | OUTPATIENT
Start: 2024-11-27

## 2024-11-27 RX ORDER — HYDROCHLOROTHIAZIDE 12.5 MG/1
12.5 CAPSULE ORAL DAILY
Qty: 90 CAPSULE | Refills: 2 | Status: SHIPPED | OUTPATIENT
Start: 2024-11-27
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
## 2024-11-27 NOTE — ASSESSMENT & PLAN NOTE
Took old rprescription of gabapentin last night which helped. Takes sparingly. Will start back w yoga & stretching
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
## 2024-12-16 ENCOUNTER — PATIENT MESSAGE (OUTPATIENT)
Dept: PRIMARY CARE CLINIC | Facility: CLINIC | Age: 74
End: 2024-12-16
Payer: COMMERCIAL

## 2025-03-04 ENCOUNTER — OFFICE VISIT (OUTPATIENT)
Dept: URGENT CARE | Facility: CLINIC | Age: 75
End: 2025-03-04
Payer: COMMERCIAL

## 2025-03-04 VITALS
HEART RATE: 93 BPM | OXYGEN SATURATION: 98 % | HEIGHT: 60 IN | DIASTOLIC BLOOD PRESSURE: 91 MMHG | TEMPERATURE: 99 F | SYSTOLIC BLOOD PRESSURE: 127 MMHG | WEIGHT: 109 LBS | BODY MASS INDEX: 21.4 KG/M2 | RESPIRATION RATE: 16 BRPM

## 2025-03-04 DIAGNOSIS — R05.3 CHRONIC COUGH: ICD-10-CM

## 2025-03-04 DIAGNOSIS — J18.9 PNEUMONIA OF RIGHT LOWER LOBE DUE TO INFECTIOUS ORGANISM: Primary | ICD-10-CM

## 2025-03-04 PROCEDURE — 71046 X-RAY EXAM CHEST 2 VIEWS: CPT | Mod: S$GLB,,, | Performed by: RADIOLOGY

## 2025-03-04 PROCEDURE — 99214 OFFICE O/P EST MOD 30 MIN: CPT | Mod: S$GLB,,, | Performed by: SURGERY

## 2025-03-04 RX ORDER — AZITHROMYCIN 250 MG/1
250 TABLET, FILM COATED ORAL DAILY
Qty: 6 TABLET | Refills: 0 | Status: SHIPPED | OUTPATIENT
Start: 2025-03-04 | End: 2025-03-09

## 2025-03-04 RX ORDER — PROMETHAZINE HYDROCHLORIDE AND DEXTROMETHORPHAN HYDROBROMIDE 6.25; 15 MG/5ML; MG/5ML
5 SYRUP ORAL EVERY 4 HOURS PRN
Qty: 180 ML | Refills: 0 | Status: SHIPPED | OUTPATIENT
Start: 2025-03-04 | End: 2025-03-11

## 2025-03-04 RX ORDER — AMOXICILLIN AND CLAVULANATE POTASSIUM 875; 125 MG/1; MG/1
1 TABLET, FILM COATED ORAL EVERY 12 HOURS
Qty: 14 TABLET | Refills: 0 | Status: SHIPPED | OUTPATIENT
Start: 2025-03-04

## 2025-03-04 NOTE — PROGRESS NOTES
Subjective:      Patient ID: Yuliana Soares is a 74 y.o. female.    Vitals:  height is 5' (1.524 m) and weight is 49.4 kg (109 lb). Her tympanic temperature is 98.6 °F (37 °C). Her blood pressure is 127/91 (abnormal) and her pulse is 93. Her respiration is 16 and oxygen saturation is 98%.     Chief Complaint: Cough    Patient reports a productive cough for over 1 month. Patient took Mucinex and coricidin for her symptoms.    Cough  This is a recurrent problem. Episode onset: 1 month. The problem has been gradually worsening. The problem occurs every few minutes. The cough is Productive of sputum. Associated symptoms include headaches (mild), myalgias, nasal congestion, postnasal drip, rhinorrhea and a sore throat (from coughing). Nothing aggravates the symptoms. She has tried OTC cough suppressant for the symptoms. Her past medical history is significant for bronchitis. There is no history of asthma.       HENT:  Positive for postnasal drip and sore throat (from coughing). Negative for sinus pain and sinus pressure.         HOARSE   Respiratory:  Positive for cough.    Musculoskeletal:  Positive for muscle ache.   Neurological:  Positive for headaches (mild).      Objective:     Physical Exam   Constitutional: She is oriented to person, place, and time. She appears well-developed. She is cooperative.  Non-toxic appearance. She does not appear ill. No distress.   HENT:   Head: Normocephalic and atraumatic.   Ears:   Right Ear: Hearing, tympanic membrane, external ear and ear canal normal.   Left Ear: Hearing, tympanic membrane, external ear and ear canal normal.   Nose: Nose normal. No mucosal edema, rhinorrhea or nasal deformity. No epistaxis. Right sinus exhibits no maxillary sinus tenderness and no frontal sinus tenderness. Left sinus exhibits no maxillary sinus tenderness and no frontal sinus tenderness.   Mouth/Throat: Uvula is midline, oropharynx is clear and moist and mucous membranes are normal. No  trismus in the jaw. Normal dentition. No uvula swelling. No oropharyngeal exudate, posterior oropharyngeal edema or posterior oropharyngeal erythema.   Eyes: Conjunctivae and lids are normal. No scleral icterus.   Neck: Trachea normal and phonation normal. Neck supple. No edema present. No erythema present. No neck rigidity present.   Cardiovascular: Normal rate, regular rhythm, normal heart sounds and normal pulses.   Pulmonary/Chest: Effort normal and breath sounds normal. No respiratory distress. She has no decreased breath sounds. She has no rhonchi.   Abdominal: Normal appearance.   Musculoskeletal: Normal range of motion.         General: No deformity. Normal range of motion.   Neurological: She is alert and oriented to person, place, and time. She exhibits normal muscle tone. Coordination normal.   Skin: Skin is warm, dry, intact, not diaphoretic and not pale.   Psychiatric: Her speech is normal and behavior is normal. Judgment and thought content normal.   Nursing note and vitals reviewed.      Assessment:     1. Pneumonia of right lower lobe due to infectious organism    2. Chronic cough        Plan:       Pneumonia of right lower lobe due to infectious organism  -     azithromycin (Z-RAYMOND) 250 MG tablet; Take 1 tablet (250 mg total) by mouth once daily. Take 2 tabs on day 1 then 1 tab daily until complete for 5 days  Dispense: 6 tablet; Refill: 0  -     amoxicillin-clavulanate 875-125mg (AUGMENTIN) 875-125 mg per tablet; Take 1 tablet by mouth every 12 (twelve) hours.  Dispense: 14 tablet; Refill: 0  -     promethazine-dextromethorphan (PROMETHAZINE-DM) 6.25-15 mg/5 mL Syrp; Take 5 mLs by mouth every 4 (four) hours as needed (cough).  Dispense: 180 mL; Refill: 0    Chronic cough  -     XR CHEST PA AND LATERAL; Future; Expected date: 03/04/2025      XR CHEST PA AND LATERAL  Result Date: 3/4/2025  EXAMINATION: XR CHEST PA AND LATERAL CLINICAL HISTORY: Chronic cough TECHNIQUE: PA and lateral views of the chest  were performed. COMPARISON: 11/13/2014. FINDINGS: The cardiac silhouette and superior mediastinal structures are unremarkable. Pulmonary vasculature is within normal limits. The lungs are well aerated and free of focal consolidations. There is no evidence for pneumothorax or pleural effusions. Bony structures are grossly intact.     No acute chest disease identified. Electronically signed by: Mike Redmond MD Date:    03/04/2025 Time:    16:13

## 2025-04-03 ENCOUNTER — OFFICE VISIT (OUTPATIENT)
Dept: ENDOCRINOLOGY | Facility: CLINIC | Age: 75
End: 2025-04-03
Payer: COMMERCIAL

## 2025-04-03 VITALS
DIASTOLIC BLOOD PRESSURE: 80 MMHG | WEIGHT: 113.19 LBS | BODY MASS INDEX: 22.82 KG/M2 | HEART RATE: 77 BPM | SYSTOLIC BLOOD PRESSURE: 128 MMHG | HEIGHT: 59 IN

## 2025-04-03 DIAGNOSIS — M54.12 CERVICAL RADICULOPATHY AT C5: ICD-10-CM

## 2025-04-03 DIAGNOSIS — E55.9 VITAMIN D DEFICIENCY: ICD-10-CM

## 2025-04-03 DIAGNOSIS — M81.0 OSTEOPOROSIS WITHOUT CURRENT PATHOLOGICAL FRACTURE, UNSPECIFIED OSTEOPOROSIS TYPE: Primary | ICD-10-CM

## 2025-04-03 PROCEDURE — 3074F SYST BP LT 130 MM HG: CPT | Mod: CPTII,S$GLB,, | Performed by: HOSPITALIST

## 2025-04-03 PROCEDURE — 1159F MED LIST DOCD IN RCRD: CPT | Mod: CPTII,S$GLB,, | Performed by: HOSPITALIST

## 2025-04-03 PROCEDURE — 3079F DIAST BP 80-89 MM HG: CPT | Mod: CPTII,S$GLB,, | Performed by: HOSPITALIST

## 2025-04-03 PROCEDURE — 1160F RVW MEDS BY RX/DR IN RCRD: CPT | Mod: CPTII,S$GLB,, | Performed by: HOSPITALIST

## 2025-04-03 PROCEDURE — 1101F PT FALLS ASSESS-DOCD LE1/YR: CPT | Mod: CPTII,S$GLB,, | Performed by: HOSPITALIST

## 2025-04-03 PROCEDURE — 99999 PR PBB SHADOW E&M-EST. PATIENT-LVL IV: CPT | Mod: PBBFAC,,, | Performed by: HOSPITALIST

## 2025-04-03 PROCEDURE — 99204 OFFICE O/P NEW MOD 45 MIN: CPT | Mod: S$GLB,,, | Performed by: HOSPITALIST

## 2025-04-03 PROCEDURE — 3288F FALL RISK ASSESSMENT DOCD: CPT | Mod: CPTII,S$GLB,, | Performed by: HOSPITALIST

## 2025-04-03 PROCEDURE — 3008F BODY MASS INDEX DOCD: CPT | Mod: CPTII,S$GLB,, | Performed by: HOSPITALIST

## 2025-04-03 NOTE — ASSESSMENT & PLAN NOTE
- Lab work reviewed, and discussed with patient in clinic  - Vitamin-D goal greater than >30  - Vit OTC VitD3 4000iu daily  - lab work every 6 mo or yearly

## 2025-04-03 NOTE — PROGRESS NOTES
"Subjective:      Patient ID: Yuliana Soares is a 74 y.o. female presented to [facility] today.  Chief complaint:  Osteoporosis      History of Present illness: Yuliana Soares is a 74 y.o. female here for  osteoporosis  Other significant past medical history:  Hypertension, cervical radiculopathy  Current PCP Cindi Wolf MD    Interval history: Here for evaluation of osteoporosis, long-term in nature.  Patient is here for discussion of alternative therapy for Actonel, requesting to be switch to Prolia as her sister is on it with successful bone density improvement   Patient's  also sees me for osteoporosis      Osteoporosis  - Patient reports that she was on medication for osteoporosis since age 55, has been on Actonel once monthly  - Current medication:  Actonel once a month  since age 55 to currently  - History of falls over the last 2 years: no  - History of fractures to wrist, hip or spine: no  - hx of compression fracture back in her 30s after traumatic fall  - Vit D intake?/Calcium intake?  On supplements  - Unfortunately bone density done 08/2024 versus 03/2022 done at different bone density machine, unable to fully interpret results    Osteoporosis Risk Factor Assessment:  - Family history of osteoporosis: yes. Sister  - Family history of fractures of hip: no  - History of loss of height of more than 1 1/2 to 2 inches: yes, measured height in clinic: 4'10.5", previously reported 5'1"  - Age of menopause: 50s Hysterectomy, No: use of HRT  - Tobacco use, including use in the past:  no   - Patient lives with:  Family at home  - Walking gait:  tashia    Medical hx  - Dental work planned? yes, Sees dentist in town regularly, with routine cleaning: yes  - Chronic kidney disease: no  - History of Hyperparathyrodism, no  - History of kidney stones, no  - History of cancer or malignancy, history of malignancy, or prior radiation treatment, no    Recent DXA: Reviewed " "  8/05/2024  ([facility])  The bone mineral density measured from L1 through L4 is 1.250g/cm2.  This corresponds to a T score of 0.5 and a Z score of 2.6.  The bone mineral density within the left femoral neck measures 0.632 g/cm2.  This corresponds to a T score of -2.9 and a Z score of -0.8.  The bone mineral density within the right femoral neck measures 0.701 g/cm2.  This corresponds to a T score of -2.4 and a Z score of -0.3.     FRAX RESULTS:  10-year Probability of Fracture:  Major Osteoporotic Fracture 27.7%.  Hip Fracture 10.2%.     Impression:  Osteoporosis    3/28/2022 ([place] location)  Lumbar spine (L1-L4): BMD is 1.053 g/cm2, T-score is 0.1, and Z-score is 2.3.  Total hip: BMD is 0.689 g/cm2, T-score is -2.1, and Z-score is -0.5.  Femoral neck: BMD is 0.507 g/cm2, T-score is -3.1, and Z-score is -1.2.  Distal 1/3 radius: Not applicable     FRAX:  18% risk of a major osteoporotic fracture in the next 10 years.  6.1% risk of hip fracture in the next 10 years.     Impression:  *Osteoporosis based on T-score below -2.5.  *Fracture risk is very high due to T-score below -3.0.  *Compared with previous DXA, BMD at the lumbar spine has increased by 9.2%, and the BMD at the total hip has remained stable.     Lab work reviewed  Lab Results   Component Value Date    CALCIUM 9.4 11/22/2024    CALCIUM 9.3 11/01/2023    CALCIUM 9.7 01/27/2023    [id number] 26 (L) 02/02/2023     Lab Results   Component Value Date    ALKPHOS 76 11/22/2024    ALKPHOS 68 11/01/2023    ALKPHOS 82 01/27/2023    EGFRNORACEVR >60.0 11/22/2024    ALBUMIN 4.2 11/22/2024    TSH 2.656 11/01/2023     No results found for: "PTHRELATEDPR", "[id number]", "CTELOPEPTIDE", "PROCOLLAGEN"     The patient's medications, allergies, past medical, surgical, social and family histories were reviewed and updated as appropriate.  Review of Systems: pertinent positives as per the above HPI, and otherwise negative.    Objective:   /80   " "Pulse 77   Ht 4' 10.5" (1.486 m)   Wt 51.3 kg (113 lb 3.2 oz)   BMI 23.26 kg/m²   Body mass index is 23.26 kg/m².  Vital signs reviewed    Physical Exam  Vitals and nursing note reviewed.   Constitutional:       Appearance: Normal appearance. She is well-developed. She is not ill-appearing.   Neck:      Thyroid: No thyromegaly.   Pulmonary:      Effort: Pulmonary effort is normal. No respiratory distress.   Musculoskeletal:         General: Normal range of motion.      Cervical back: Normal range of motion.   Neurological:      General: No focal deficit present.      Mental Status: She is alert. Mental status is at baseline.   Psychiatric:         Mood and Affect: Mood normal.         Behavior: Behavior normal.       Labs reviewed:  See results in subjective  Lab Results   Component Value Date    HGBA1C 5.5 01/27/2023     Lab Results   Component Value Date    CHOL 181 11/22/2024    HDL 81 (H) 11/22/2024    LDLCALC 89.2 11/22/2024    TRIG 54 11/22/2024    CHOLHDL 44.8 11/22/2024     Lab Results   Component Value Date     11/22/2024    K 3.5 11/22/2024     11/22/2024    CO2 29 11/22/2024    BUN 10 11/22/2024    CREATININE 0.7 11/22/2024    CALCIUM 9.4 11/22/2024    PROT 7.1 11/22/2024    ALBUMIN 4.2 11/22/2024    BILITOT 0.4 11/22/2024    ALKPHOS 76 11/22/2024    AST 21 11/22/2024    ALT 17 11/22/2024    ANIONGAP 10 11/22/2024    EGFRNORACEVR >60.0 11/22/2024    TSH 2.656 11/01/2023    VHLRDATI21EU 26 (L) 02/02/2023     Assessment     1. Osteoporosis without current pathological fracture, unspecified osteoporosis type  Ambulatory referral/consult to Endocrinology    RENAL FUNCTION PANEL      2. Vitamin D deficiency  Vitamin D      3. Cervical radiculopathy at C5           Plan     Osteoporosis  - Patient is here for evaluation and management of osteoporosis since age 55  - Recent DXA: 8/05/2024 ([facility]), left femoral neck T-score -2.9 showing osteoporosis, unable to compare to 2022 bone " density  - History of fractures to wrist, hip or spine: no, hx of compression fracture back in her 30s after traumatic fall.   - VERY HIGH FRACTURE RISK.  LONG-TERM USE OF BISPHOSPHONATE FOR REPORTEDLY OVER 20 YEARS  - No dental issues. The patient has a local dentist for routine cleanings and monitoring.  - Given the high risk of fractures, I discussed that the benefits of therapy outweigh the risks of side effects.    PLAN  - Discussed treatment options:  Given reported over 20 years of alendronate, still with severe osteoporosis, recommend switching to: Prolia every 6 months  - START: SC Prolia every 6 months. >> Patient agrees with this plan.  Sent to outpatient infusion program given commercial insurance  - Duration of therapy: Minimum of 2 years; discussed potential side effects, including acute-phase reactions.  - Supplement recommendations: Calcium 500-700 mg daily and vitamin D3 4000 IU daily (OTC).  - Routine lab work: Check renal panel and vitamin D every 6 months.  - Fall precautions & exercise regimen: Advised weight-bearing and resistance exercises.  - Routine dental care: Recommended dental cleanings every 6 months.  - Next DXA scan: 8/5/2026 (next should go back to [facility]).  - Follow-up: Routine visits every 6 months.    Vitamin D deficiency  - Lab work reviewed, and discussed with patient in clinic  - Vitamin-D goal greater than >30  - Vit OTC VitD3 4000iu daily  - lab work every 6 mo or yearly     Cervical radiculopathy at C5  - routine monitoring with PCP    Advised patient to follow up with PCP for routine health maintenance care.   RTC in six months    Martínez Monte M.D.  Endocrinology  [facility]  4/3/2025      Disclaimer: This note has been generated in part with the use of voice-recognition software. There may be typographical errors that have been missed during proof-reading.

## 2025-04-03 NOTE — ASSESSMENT & PLAN NOTE
- Patient is here for evaluation and management of osteoporosis since age 55  - Recent DXA: 8/05/2024 ([facility]), left femoral neck T-score -2.9 showing osteoporosis, unable to compare to 2022 bone density  - History of fractures to wrist, hip or spine: no, hx of compression fracture back in her 30s after traumatic fall.   - VERY HIGH FRACTURE RISK.  LONG-TERM USE OF BISPHOSPHONATE FOR REPORTEDLY OVER 20 YEARS  - No dental issues. The patient has a local dentist for routine cleanings and monitoring.  - Given the high risk of fractures, I discussed that the benefits of therapy outweigh the risks of side effects.    PLAN  - Discussed treatment options:  Given reported over 20 years of alendronate, still with severe osteoporosis, recommend switching to: Prolia every 6 months  - START: SC Prolia every 6 months. >> Patient agrees with this plan.  Sent to outpatient infusion program given commercial insurance  - Duration of therapy: Minimum of 2 years; discussed potential side effects, including acute-phase reactions.  - Supplement recommendations: Calcium 500-700 mg daily and vitamin D3 4000 IU daily (OTC).  - Routine lab work: Check renal panel and vitamin D every 6 months.  - Fall precautions & exercise regimen: Advised weight-bearing and resistance exercises.  - Routine dental care: Recommended dental cleanings every 6 months.  - Next DXA scan: 8/5/2026 (next should go back to [facility]).  - Follow-up: Routine visits every 6 months.

## 2025-04-14 ENCOUNTER — TELEPHONE (OUTPATIENT)
Dept: OPHTHALMOLOGY | Facility: CLINIC | Age: 75
End: 2025-04-14
Payer: COMMERCIAL

## 2025-04-24 ENCOUNTER — HOSPITAL ENCOUNTER (OUTPATIENT)
Dept: RADIOLOGY | Facility: HOSPITAL | Age: 75
Discharge: HOME | End: 2025-04-24
Attending: FAMILY MEDICINE
Payer: COMMERCIAL

## 2025-04-24 VITALS — BODY MASS INDEX: 22.78 KG/M2 | HEIGHT: 59 IN | WEIGHT: 113 LBS

## 2025-04-24 DIAGNOSIS — Z12.31 ENCOUNTER FOR SCREENING MAMMOGRAM FOR BREAST CANCER: ICD-10-CM

## 2025-04-24 PROCEDURE — 77063 BREAST TOMOSYNTHESIS BI: CPT | Mod: TC

## 2025-07-09 ENCOUNTER — OFFICE VISIT (OUTPATIENT)
Dept: OPTOMETRY | Facility: CLINIC | Age: 75
End: 2025-07-09
Payer: COMMERCIAL

## 2025-07-09 DIAGNOSIS — Z96.1 PSEUDOPHAKIA OF BOTH EYES: ICD-10-CM

## 2025-07-09 DIAGNOSIS — H01.003 BLEPHARITIS OF BOTH EYES, UNSPECIFIED EYELID, UNSPECIFIED TYPE: ICD-10-CM

## 2025-07-09 DIAGNOSIS — H52.13 MYOPIA WITH ASTIGMATISM AND PRESBYOPIA, BILATERAL: ICD-10-CM

## 2025-07-09 DIAGNOSIS — H04.123 DRY EYES, BILATERAL: ICD-10-CM

## 2025-07-09 DIAGNOSIS — H52.203 MYOPIA WITH ASTIGMATISM AND PRESBYOPIA, BILATERAL: ICD-10-CM

## 2025-07-09 DIAGNOSIS — H01.006 BLEPHARITIS OF BOTH EYES, UNSPECIFIED EYELID, UNSPECIFIED TYPE: ICD-10-CM

## 2025-07-09 DIAGNOSIS — H10.13 ALLERGIC CONJUNCTIVITIS OF BOTH EYES: Primary | ICD-10-CM

## 2025-07-09 DIAGNOSIS — H52.4 MYOPIA WITH ASTIGMATISM AND PRESBYOPIA, BILATERAL: ICD-10-CM

## 2025-07-09 DIAGNOSIS — Z98.890 HISTORY OF RADIAL KERATOTOMY: ICD-10-CM

## 2025-07-09 PROBLEM — H25.11 NUCLEAR SCLEROTIC CATARACT OF RIGHT EYE: Status: RESOLVED | Noted: 2021-08-05 | Resolved: 2025-07-09

## 2025-07-09 PROCEDURE — 1126F AMNT PAIN NOTED NONE PRSNT: CPT | Mod: CPTII,S$GLB,, | Performed by: OPTOMETRIST

## 2025-07-09 PROCEDURE — 92015 DETERMINE REFRACTIVE STATE: CPT | Mod: S$GLB,,, | Performed by: OPTOMETRIST

## 2025-07-09 PROCEDURE — 1101F PT FALLS ASSESS-DOCD LE1/YR: CPT | Mod: CPTII,S$GLB,, | Performed by: OPTOMETRIST

## 2025-07-09 PROCEDURE — 1159F MED LIST DOCD IN RCRD: CPT | Mod: CPTII,S$GLB,, | Performed by: OPTOMETRIST

## 2025-07-09 PROCEDURE — 99214 OFFICE O/P EST MOD 30 MIN: CPT | Mod: S$GLB,,, | Performed by: OPTOMETRIST

## 2025-07-09 PROCEDURE — 3288F FALL RISK ASSESSMENT DOCD: CPT | Mod: CPTII,S$GLB,, | Performed by: OPTOMETRIST

## 2025-07-09 PROCEDURE — 99999 PR PBB SHADOW E&M-EST. PATIENT-LVL II: CPT | Mod: PBBFAC,,, | Performed by: OPTOMETRIST

## 2025-07-09 PROCEDURE — 1160F RVW MEDS BY RX/DR IN RCRD: CPT | Mod: CPTII,S$GLB,, | Performed by: OPTOMETRIST

## 2025-07-09 RX ORDER — CYCLOSPORINE OPHTHALMIC SOLUTION 1 MG/ML
1 SOLUTION/ DROPS OPHTHALMIC 2 TIMES DAILY
Qty: 2 ML | Refills: 11 | Status: SHIPPED | OUTPATIENT
Start: 2025-07-09 | End: 2026-07-09

## 2025-07-09 NOTE — PROGRESS NOTES
HPI    Pt is here today for a Routine eye exam.  She was last seen 03/04/2024 with Dr. Coreas. Today she reports over the   past year or so she has noticed a decrease in her vision at both ranges,   near seems worse. She does also have trouble with dryness`. Her ENT did   prescribe her Azelastine but she feels that made the dryness worse.     Eye Meds:  OTC Tears prn OU    Last edited by Danie Vargas on 7/9/2025  3:23 PM.            Assessment /Plan     For exam results, see Encounter Report.    Allergic conjunctivitis of both eyes   D/c azelestine, start pataday XS PRN    Blepharitis of both eyes, unspecified eyelid, unspecified type   Start iVZIA eyelid scrubs daily    History of radial keratotomy  Dry eyes, bilateral    Failed restasis and art tears    Start cycloSPORINE (VEVYE) 0.1 % Drop; Place 1 drop into both eyes 2 (two) times a day.  Dispense: 2 mL; Refill: 11   Continue with thick PF art tears at bedtime    Pseudophakia of both eyes   Stable, monitor    Myopia with astigmatism and presbyopia, bilateral   Rx specs   Discussed computer/ reading bifocal    RTC  1 year, sooner PRN

## 2025-08-27 DIAGNOSIS — Z00.00 ROUTINE GENERAL MEDICAL EXAMINATION AT A HEALTH CARE FACILITY: ICD-10-CM

## 2025-08-27 DIAGNOSIS — I10 HTN (HYPERTENSION), BENIGN: ICD-10-CM

## 2025-08-27 RX ORDER — HYDROCHLOROTHIAZIDE 12.5 MG/1
12.5 CAPSULE ORAL DAILY
Qty: 90 CAPSULE | Refills: 0 | Status: SHIPPED | OUTPATIENT
Start: 2025-08-27

## 2025-08-28 ENCOUNTER — TELEPHONE (OUTPATIENT)
Dept: ENDOCRINOLOGY | Facility: CLINIC | Age: 75
End: 2025-08-28
Payer: COMMERCIAL

## (undated) DEVICE — GLOVE BIOGEL SKINSENSE PI 7.5

## (undated) DEVICE — SOL BETADINE 5%

## (undated) DEVICE — SYR SLIP TIP 1CC

## (undated) DEVICE — DRESSING LEUKOPLAST FLEX 1X3IN

## (undated) DEVICE — Device

## (undated) DEVICE — CASSETTE INFINITI